# Patient Record
Sex: FEMALE | Race: WHITE | NOT HISPANIC OR LATINO | URBAN - METROPOLITAN AREA
[De-identification: names, ages, dates, MRNs, and addresses within clinical notes are randomized per-mention and may not be internally consistent; named-entity substitution may affect disease eponyms.]

---

## 2021-07-20 ENCOUNTER — OFFICE VISIT (OUTPATIENT)
Dept: URGENT CARE | Facility: CLINIC | Age: 51
End: 2021-07-20
Payer: COMMERCIAL

## 2021-07-20 VITALS
HEART RATE: 86 BPM | WEIGHT: 240.8 LBS | RESPIRATION RATE: 18 BRPM | HEIGHT: 66 IN | BODY MASS INDEX: 38.7 KG/M2 | TEMPERATURE: 97.8 F | SYSTOLIC BLOOD PRESSURE: 172 MMHG | OXYGEN SATURATION: 98 % | DIASTOLIC BLOOD PRESSURE: 100 MMHG

## 2021-07-20 DIAGNOSIS — G44.209 ACUTE NON INTRACTABLE TENSION-TYPE HEADACHE: Primary | ICD-10-CM

## 2021-07-20 PROCEDURE — 99213 OFFICE O/P EST LOW 20 MIN: CPT | Performed by: PHYSICIAN ASSISTANT

## 2021-07-20 RX ORDER — METOPROLOL SUCCINATE 50 MG/1
50 TABLET, EXTENDED RELEASE ORAL DAILY
COMMUNITY
Start: 2021-05-24 | End: 2021-12-29

## 2021-07-20 RX ORDER — AMLODIPINE BESYLATE 10 MG/1
10 TABLET ORAL DAILY
COMMUNITY
Start: 2021-05-24 | End: 2021-12-29

## 2021-07-20 NOTE — PROGRESS NOTES
[facility]        NAME: Bari Noel is a 45 y o  female  : 1970    MRN: [id number]  DATE: 2021  TIME: 12:05 PM    Assessment and Plan   Acute non intractable tension-type headache [G44 209]  1  Acute non intractable tension-type headache           Patient Instructions     Patient Instructions   Recommend continued use of OTC tylenol/ibuprofen as needed for symptomatic relief of tension type headache likely induced by increased stress  Advised patient establish a PCP in area for follow up  Advised to return or be seen in ER with any progressing or worsening symptoms  Follow up with PCP in 3-5 days  Proceed to  ER if symptoms worsen  Chief Complaint     Chief Complaint   Patient presents with    Headache     Notes HA since receiving Suzzapadminie Favian vaccine on 21  Head feels like "in a vise" with pain R temple and eyes  Denies facial pain  Rhinorrhea started today  Has nausea and occ  vertigo    Taking Tylenol or Excedrin  History of Present Illness       Patient is a 44yo F presenting today with headache x 11 days  Patient states following COVID19 vaccine on 21 she has been experiencing intermittent tension like headaches daily  She describes the pain as a squeezing like feeling on both sides of her head and behind her eyes, she notes occasional associated nausea and one episode of vomiting  States the pain comes and goes noting symptoms are worse at night  She states she has taken OTC tylenol as recommended as well as Excedrin with minimal relief  She notes mild congestion and rhinorrhea, has not taken anything for these symptoms  States she has had some blurred vision while pain is at its worst, denies vision loss or eye pain  She denies fever, chills, lightheaded or dizziness, loss of balance, photophobia   Patient admits that she has recently been under a lot more stress with the care of her parents and with her work which have been affecting her sleep at night        Review of Systems   Review of Systems   Constitutional: Negative for chills and fever  HENT: Positive for congestion and rhinorrhea  Negative for ear pain, sinus pressure, sinus pain and sore throat  Eyes: Negative for photophobia, pain and visual disturbance  Respiratory: Negative for cough, chest tightness and shortness of breath  Cardiovascular: Negative for chest pain and palpitations  Gastrointestinal: Positive for nausea and vomiting (1 episode)  Negative for abdominal pain  Musculoskeletal: Negative for myalgias  Skin: Negative for rash  Neurological: Positive for headaches (tension like x 11 days)  Negative for dizziness, syncope, weakness and light-headedness     Psychiatric/Behavioral:        Increased stress and anxiety         Current Medications       Current Outpatient Medications:     amLODIPine (NORVASC) 10 mg tablet, Take 10 mg by mouth daily, Disp: , Rfl:     metoprolol succinate (TOPROL-XL) 50 mg 24 hr tablet, Take 50 mg by mouth daily, Disp: , Rfl:     Current Allergies     Allergies as of 07/20/2021 - Reviewed 07/20/2021   Allergen Reaction Noted    Adhesive [medical tape] Rash 02/20/2019    Betadine [povidone iodine] Rash 07/20/2021            The following portions of the patient's history were reviewed and updated as appropriate: allergies, current medications, past family history, past medical history, past social history, past surgical history and problem list      Past Medical History:   Diagnosis Date    Allergic rhinitis     Anxiety     Disease of thyroid gland     Hypertension     Kidney stones     left side    Migraine     teenager       Past Surgical History:   Procedure Laterality Date    APPENDECTOMY      CHOLECYSTECTOMY      HERNIA REPAIR      umbilical x 2    KNEE CARTILAGE SURGERY Right     and ACL    LAPAROSCOPIC GASTRIC BANDING      removed    OVARIAN CYST SURGERY      SHOULDER SURGERY Right     x 3       No family history on file       Medications have been verified  Objective   BP (!) 172/100   Pulse 86   Temp 97 8 °F (36 6 °C)   Resp 18   Ht 5' 6" (1 676 m)   Wt 109 kg (240 lb 12 8 oz)   SpO2 98%   BMI 38 87 kg/m²        Physical Exam     Physical Exam  Vitals reviewed  Constitutional:       General: She is not in acute distress  Appearance: Normal appearance  She is not ill-appearing  Comments: Patient appears comfortable, looking through phone upon entering the room  HENT:      Head: Normocephalic and atraumatic  Comments: Mild tenderness of temporal region bilaterally upon palpation  Right Ear: Tympanic membrane, ear canal and external ear normal       Left Ear: Tympanic membrane, ear canal and external ear normal       Nose: Congestion present  Mouth/Throat:      Mouth: Mucous membranes are moist       Pharynx: Oropharynx is clear  Eyes:      General:         Right eye: No discharge  Left eye: No discharge  Extraocular Movements: Extraocular movements intact  Conjunctiva/sclera: Conjunctivae normal       Pupils: Pupils are equal, round, and reactive to light  Neck:      Vascular: No carotid bruit  Cardiovascular:      Rate and Rhythm: Normal rate and regular rhythm  Pulses: Normal pulses  Heart sounds: Normal heart sounds  Pulmonary:      Effort: Pulmonary effort is normal       Breath sounds: Normal breath sounds  Musculoskeletal:         General: Normal range of motion  Cervical back: Normal range of motion and neck supple  No rigidity or tenderness  Lymphadenopathy:      Cervical: No cervical adenopathy  Skin:     General: Skin is warm and dry  Capillary Refill: Capillary refill takes less than 2 seconds  Neurological:      General: No focal deficit present  Mental Status: She is alert and oriented to person, place, and time  Cranial Nerves: No cranial nerve deficit  Motor: No weakness        Coordination: Coordination normal       Deep Tendon Reflexes: Reflexes normal    Psychiatric:         Mood and Affect: Mood normal          Behavior: Behavior normal

## 2021-07-20 NOTE — PATIENT INSTRUCTIONS
Recommend continued use of OTC tylenol/ibuprofen as needed for symptomatic relief of tension type headache likely induced by increased stress  Advised patient establish a PCP in area for follow up  Advised to return or be seen in ER with any progressing or worsening symptoms  [facility] Instructions    MEDICATIONS     Medication Note  Continue present medications as prescribed by the [facility] or other physicians you see. To avoid any problems keep the [facility] informed each visit of any medications changes that occur.     WOUND CARE     Clean Wound with: Saline Solution   Treatment 1   Location: left foot/leg  Dressing: Apply yessi, adaptic, with dry dressing & ace.  Use amlactin on dried skin areas   Dressing Care Frequency: Every Other Dayor daily  Care Provider: family       Basic Principles      • Wash your hands thoroughly with soap and water and after each dressing change. If someone other than the patient changes the dressing, it’s best to wear disposable gloves.   • Do not get the wound or dressing wet.   • To shower: remove the dressing, shower with soap and water (including washing the wound - do not use a washcloth), air dry, then redress the wound.  • Do not take a tub bath  • Keep all your dressings in a clean, covered container at home to avoid dust and contamination.  • Discard used dressings in a plastic bag or covered trash container.  • Check your wound and the surrounding skin at each dressing change for redness, warmth, swelling, increased pain, foul odor, fever, pus or abnormal drainage or discharge.  • Notify [facility]aling Center if any of these changes occur - Dept: [phone].        Nutrition  • Eat a well, balanced diet with adequate protein to support wound healing.   • Take a multivitamin every day. Adequate nutrition supports healing and new tissue growth.  • All diabetic patients should strive to keep blood sugars within a normal, practical range.   • Elevated blood sugars can delay your wound healing.        ACTIVITY     Elevate legs above level of heart   Normal activity then rest and elevation  May excercise  May walk    MOBILITY     boot

## 2021-09-23 ENCOUNTER — OFFICE VISIT (OUTPATIENT)
Dept: URGENT CARE | Facility: CLINIC | Age: 51
End: 2021-09-23
Payer: COMMERCIAL

## 2021-09-23 VITALS
BODY MASS INDEX: 38.25 KG/M2 | WEIGHT: 237 LBS | RESPIRATION RATE: 18 BRPM | SYSTOLIC BLOOD PRESSURE: 182 MMHG | OXYGEN SATURATION: 99 % | TEMPERATURE: 97.7 F | DIASTOLIC BLOOD PRESSURE: 95 MMHG | HEART RATE: 73 BPM

## 2021-09-23 DIAGNOSIS — R05.9 COUGH: ICD-10-CM

## 2021-09-23 DIAGNOSIS — J02.9 SORE THROAT: Primary | ICD-10-CM

## 2021-09-23 DIAGNOSIS — R11.0 NAUSEA: ICD-10-CM

## 2021-09-23 DIAGNOSIS — F41.9 ANXIETY: ICD-10-CM

## 2021-09-23 LAB — S PYO AG THROAT QL: NEGATIVE

## 2021-09-23 PROCEDURE — 99204 OFFICE O/P NEW MOD 45 MIN: CPT | Performed by: PHYSICIAN ASSISTANT

## 2021-09-23 PROCEDURE — 87880 STREP A ASSAY W/OPTIC: CPT | Performed by: PHYSICIAN ASSISTANT

## 2021-09-23 RX ORDER — BROMPHENIRAMINE MALEATE, PSEUDOEPHEDRINE HYDROCHLORIDE, AND DEXTROMETHORPHAN HYDROBROMIDE 2; 30; 10 MG/5ML; MG/5ML; MG/5ML
5 SYRUP ORAL 4 TIMES DAILY PRN
Qty: 120 ML | Refills: 0 | Status: SHIPPED | OUTPATIENT
Start: 2021-09-23

## 2021-09-23 RX ORDER — ONDANSETRON 4 MG/1
4 TABLET, ORALLY DISINTEGRATING ORAL EVERY 6 HOURS PRN
Qty: 20 TABLET | Refills: 0 | Status: SHIPPED | OUTPATIENT
Start: 2021-09-23

## 2021-09-23 RX ORDER — PREDNISONE 10 MG/1
TABLET ORAL
Qty: 18 TABLET | Refills: 0 | Status: SHIPPED | OUTPATIENT
Start: 2021-09-23

## 2021-09-23 NOTE — PATIENT INSTRUCTIONS
Zofran for nausea   Bromfed for cold symptoms   Prednisone for the symptoms   Use Flonase as well     Vitamin D3, C and zinc

## 2021-09-23 NOTE — PROGRESS NOTES
[facility] [facility]        NAME: Berenice Lundberg is a 45 y o  female  : 1970    MRN: [id number]  DATE: 2021  TIME: 1:55 PM    Assessment and Plan   Sore throat [J02 9]  1  Sore throat  POCT rapid strepA    Throat culture   2  Nausea  ondansetron (Zofran ODT) 4 mg disintegrating tablet   3  Cough  brompheniramine-pseudoephedrine-DM 30-2-10 MG/5ML syrup    predniSONE 10 mg tablet   4  Anxiety  Ambulatory referral to Psychiatry         Patient Instructions   Patient Instructions   Zofran for nausea   Bromfed for cold symptoms   Prednisone for the symptoms   Use Flonase as well     Vitamin D3, C and zinc          Follow up with PCP in 3-5 days  Proceed to  ER if symptoms worsen  Chief Complaint     Chief Complaint   Patient presents with    Sinusitis     post nasal drip , sore throat, ear pain and intermittent fevers  vomiting today         History of Present Illness       The pt is a 19-year-old female presenting with PND, sore throat, cough, ear pain and intermittent fevers  Has been vomiting daily for about a week-2  Fever was 102  1  Vomites up whatever she eats  Is on HTN meds but is throwing it up  The pt is new to the area and needs a PCP/psychiatrist  She explains that she has been under a lot of stress recently which could be attributing to her not feeling well  Review of Systems   Review of Systems   Constitutional: Positive for fever  Negative for activity change, appetite change, chills and fatigue  HENT: Positive for ear pain, postnasal drip and sore throat  Negative for congestion, rhinorrhea, sinus pressure and sinus pain  Respiratory: Negative for cough, chest tightness and shortness of breath  Cardiovascular: Negative for chest pain and palpitations  Gastrointestinal: Negative for diarrhea, nausea and vomiting  Musculoskeletal: Negative for arthralgias and myalgias  Skin: Negative for color change and pallor  Neurological: Negative for headaches  Current Medications       Current Outpatient Medications:     amLODIPine (NORVASC) 10 mg tablet, Take 10 mg by mouth daily, Disp: , Rfl:     metoprolol succinate (TOPROL-XL) 50 mg 24 hr tablet, Take 50 mg by mouth daily, Disp: , Rfl:     brompheniramine-pseudoephedrine-DM 30-2-10 MG/5ML syrup, Take 5 mL by mouth 4 (four) times a day as needed for allergies, Disp: 120 mL, Rfl: 0    ondansetron (Zofran ODT) 4 mg disintegrating tablet, Take 1 tablet (4 mg total) by mouth every 6 (six) hours as needed for nausea or vomiting, Disp: 20 tablet, Rfl: 0    predniSONE 10 mg tablet, 3 tabs daily for 3 days, 2 tabs daily for 3 days, 1 tab daily for 3 days, Disp: 18 tablet, Rfl: 0    Current Allergies     Allergies as of 09/23/2021 - Reviewed 09/23/2021   Allergen Reaction Noted    Adhesive [medical tape] Rash 02/20/2019    Betadine [povidone iodine] Rash 07/20/2021            The following portions of the patient's history were reviewed and updated as appropriate: allergies, current medications, past family history, past medical history, past social history, past surgical history and problem list      Past Medical History:   Diagnosis Date    Allergic rhinitis     Anxiety     Disease of thyroid gland     Hypertension     Kidney stones     left side    Migraine     teenager       Past Surgical History:   Procedure Laterality Date    APPENDECTOMY      CHOLECYSTECTOMY      HERNIA REPAIR      umbilical x 2    KNEE CARTILAGE SURGERY Right     and ACL    LAPAROSCOPIC GASTRIC BANDING      removed    OVARIAN CYST SURGERY      SHOULDER SURGERY Right     x 3       History reviewed  No pertinent family history  Medications have been verified  Objective   BP (!) 182/95   Pulse 73   Temp 97 7 °F (36 5 °C)   Resp 18   Wt 108 kg (237 lb)   SpO2 99%   BMI 38 25 kg/m²        Physical Exam     Physical Exam  Constitutional:       General: She is not in acute distress       Appearance: Normal appearance  She is normal weight  She is not ill-appearing, toxic-appearing or diaphoretic  HENT:      Head: Normocephalic and atraumatic  Right Ear: Tympanic membrane, ear canal and external ear normal  There is no impacted cerumen  Left Ear: Tympanic membrane, ear canal and external ear normal  There is no impacted cerumen  Ears:      Comments: Left ear canal had a small black hair in it        Nose: Nose normal  No congestion or rhinorrhea  Mouth/Throat:      Mouth: Mucous membranes are moist       Pharynx: Oropharynx is clear  Posterior oropharyngeal erythema present  No oropharyngeal exudate  Cardiovascular:      Rate and Rhythm: Normal rate and regular rhythm  Heart sounds: Normal heart sounds  No murmur heard  No friction rub  No gallop  Pulmonary:      Effort: Pulmonary effort is normal  No respiratory distress  Breath sounds: Normal breath sounds  No stridor  No wheezing, rhonchi or rales  Chest:      Chest wall: No tenderness  Abdominal:      General: Abdomen is flat  Bowel sounds are normal  There is no distension  Palpations: Abdomen is soft  There is no mass  Tenderness: There is no abdominal tenderness  There is no right CVA tenderness, left CVA tenderness, guarding or rebound  Hernia: No hernia is present  Musculoskeletal:         General: Normal range of motion  Skin:     General: Skin is warm and dry  Capillary Refill: Capillary refill takes less than 2 seconds  Neurological:      Mental Status: She is alert

## 2021-10-07 ENCOUNTER — TELEPHONE (OUTPATIENT)
Dept: PSYCHIATRY | Facility: CLINIC | Age: 51
End: 2021-10-07

## 2021-12-07 ENCOUNTER — APPOINTMENT (OUTPATIENT)
Dept: RADIOLOGY | Facility: CLINIC | Age: 51
End: 2021-12-07
Payer: COMMERCIAL

## 2021-12-07 ENCOUNTER — TELEPHONE (OUTPATIENT)
Dept: URGENT CARE | Facility: CLINIC | Age: 51
End: 2021-12-07

## 2021-12-07 ENCOUNTER — OFFICE VISIT (OUTPATIENT)
Dept: URGENT CARE | Facility: CLINIC | Age: 51
End: 2021-12-07
Payer: COMMERCIAL

## 2021-12-07 VITALS
HEART RATE: 95 BPM | SYSTOLIC BLOOD PRESSURE: 174 MMHG | RESPIRATION RATE: 12 BRPM | OXYGEN SATURATION: 98 % | DIASTOLIC BLOOD PRESSURE: 93 MMHG | TEMPERATURE: 97.7 F

## 2021-12-07 DIAGNOSIS — S96.912A MUSCLE STRAIN OF LEFT FOOT, INITIAL ENCOUNTER: Primary | ICD-10-CM

## 2021-12-07 DIAGNOSIS — S99.922A INJURY OF LEFT FOOT, INITIAL ENCOUNTER: ICD-10-CM

## 2021-12-07 PROCEDURE — 73630 X-RAY EXAM OF FOOT: CPT

## 2021-12-07 PROCEDURE — 99213 OFFICE O/P EST LOW 20 MIN: CPT | Performed by: PHYSICIAN ASSISTANT

## 2021-12-07 RX ORDER — ALPRAZOLAM 1 MG/1
TABLET ORAL 2 TIMES DAILY PRN
COMMUNITY

## 2021-12-27 ENCOUNTER — HOSPITAL ENCOUNTER (EMERGENCY)
Facility: HOSPITAL | Age: 51
Discharge: HOME | End: 2021-12-27
Attending: EMERGENCY MEDICINE | Admitting: EMERGENCY MEDICINE
Payer: COMMERCIAL

## 2021-12-27 ENCOUNTER — APPOINTMENT (EMERGENCY)
Dept: RADIOLOGY | Facility: HOSPITAL | Age: 51
End: 2021-12-27
Payer: COMMERCIAL

## 2021-12-27 VITALS
HEIGHT: 66 IN | RESPIRATION RATE: 16 BRPM | BODY MASS INDEX: 36.16 KG/M2 | TEMPERATURE: 98.1 F | WEIGHT: 225 LBS | SYSTOLIC BLOOD PRESSURE: 178 MMHG | HEART RATE: 68 BPM | OXYGEN SATURATION: 100 % | DIASTOLIC BLOOD PRESSURE: 104 MMHG

## 2021-12-27 DIAGNOSIS — M79.672 LEFT FOOT PAIN: ICD-10-CM

## 2021-12-27 DIAGNOSIS — S92.302A: Primary | ICD-10-CM

## 2021-12-27 PROCEDURE — 99283 EMERGENCY DEPT VISIT LOW MDM: CPT

## 2021-12-27 PROCEDURE — 99284 EMERGENCY DEPT VISIT MOD MDM: CPT | Performed by: PHYSICIAN ASSISTANT

## 2021-12-27 PROCEDURE — 73630 X-RAY EXAM OF FOOT: CPT

## 2021-12-27 RX ORDER — OXYCODONE HYDROCHLORIDE AND ACETAMINOPHEN 5; 325 MG/1; MG/1
1 TABLET ORAL EVERY 6 HOURS PRN
Qty: 10 TABLET | Refills: 0 | Status: SHIPPED | OUTPATIENT
Start: 2021-12-27

## 2021-12-27 RX ORDER — KETOROLAC TROMETHAMINE 30 MG/ML
15 INJECTION, SOLUTION INTRAMUSCULAR; INTRAVENOUS ONCE
Status: DISCONTINUED | OUTPATIENT
Start: 2021-12-27 | End: 2021-12-27 | Stop reason: HOSPADM

## 2021-12-27 RX ORDER — OXYCODONE HYDROCHLORIDE AND ACETAMINOPHEN 5; 325 MG/1; MG/1
1 TABLET ORAL ONCE
Status: COMPLETED | OUTPATIENT
Start: 2021-12-27 | End: 2021-12-27

## 2021-12-27 RX ADMIN — OXYCODONE AND ACETAMINOPHEN 1 TABLET: 5; 325 TABLET ORAL at 17:06

## 2021-12-27 NOTE — ED PROVIDER NOTES
History  Chief Complaint   Patient presents with    Foot Injury     States broken L foot x3 weeks, seen when incident occurred, not prescribed any medications or given any interventions, unable to follow up with anyone, states pain now unbearable  Patient is a 46 y/o female with a PMHx of hypertension and anxiety, presenting to the ED for evaluation of left foot pain  Patient had an x-ray done at urgent care on 12/7 which showed an acute, nondisplaced 4th metatarsal neck fracture  She states that they attempted to put her in a hard-soled surgical shoe but did not have her size available and she was discharged home with an ace wrap  Patient has attempted calling multiple orthopedic providers but has not been able to get an appointment  She reports worsening pain in the foot that is not relieved with tylenol  She was not given any pain medications when the injury occurred  She reports some improvement of swelling and ecchymosis of the foot but does not feel the pain has improved  She denies any calf tenderness, lower extremity edema, SOB, fevers, recent travel, hx of DVT/PE or exogenous estrogen use  Prior to Admission Medications   Prescriptions Last Dose Informant Patient Reported? Taking?    ALPRAZolam (XANAX) 1 mg tablet   Yes No   Sig: Take by mouth 2 (two) times a day as needed for anxiety   amLODIPine (NORVASC) 10 mg tablet   Yes No   Sig: Take 10 mg by mouth daily   brompheniramine-pseudoephedrine-DM 30-2-10 MG/5ML syrup   No No   Sig: Take 5 mL by mouth 4 (four) times a day as needed for allergies   Patient not taking: Reported on 12/7/2021    metoprolol succinate (TOPROL-XL) 50 mg 24 hr tablet   Yes No   Sig: Take 50 mg by mouth daily   ondansetron (Zofran ODT) 4 mg disintegrating tablet   No No   Sig: Take 1 tablet (4 mg total) by mouth every 6 (six) hours as needed for nausea or vomiting   Patient not taking: Reported on 12/7/2021    predniSONE 10 mg tablet   No No   Sig: 3 tabs daily for 3 days, 2 tabs daily for 3 days, 1 tab daily for 3 days   Patient not taking: Reported on 12/7/2021       Facility-Administered Medications: None       Past Medical History:   Diagnosis Date    Allergic rhinitis     Anxiety     Disease of thyroid gland     Hypertension     Kidney stones     left side    Migraine     teenager       Past Surgical History:   Procedure Laterality Date    APPENDECTOMY      CHOLECYSTECTOMY      HERNIA REPAIR      umbilical x 2    KNEE CARTILAGE SURGERY Right     and ACL    LAPAROSCOPIC GASTRIC BANDING      removed    OVARIAN CYST SURGERY      SHOULDER SURGERY Right     x 3       History reviewed  No pertinent family history  I have reviewed and agree with the history as documented  E-Cigarette/Vaping    E-Cigarette Use Never User      E-Cigarette/Vaping Substances    Nicotine No     THC No     CBD No     Flavoring No     Other No     Unknown No      Social History     Tobacco Use    Smoking status: Current Every Day Smoker     Packs/day: 0 25     Types: Cigarettes    Smokeless tobacco: Never Used   Vaping Use    Vaping Use: Never used   Substance Use Topics    Alcohol use: Never    Drug use: Never       Review of Systems   Constitutional: Negative for appetite change, chills, fatigue and fever  HENT: Negative for congestion, rhinorrhea, sinus pressure, sinus pain and sore throat  Eyes: Negative for photophobia and visual disturbance  Respiratory: Negative for cough, shortness of breath and wheezing  Cardiovascular: Negative for chest pain, palpitations and leg swelling  Gastrointestinal: Negative for abdominal pain, blood in stool, constipation, diarrhea, nausea and vomiting  Genitourinary: Negative for difficulty urinating, dysuria, flank pain, frequency, hematuria and urgency  Musculoskeletal: Negative for back pain, joint swelling and neck pain          Left foot pain   Neurological: Negative for dizziness, syncope, weakness, light-headedness and headaches  All other systems reviewed and are negative  Physical Exam  Physical Exam  Vitals and nursing note reviewed  Constitutional:       General: She is awake  Appearance: Normal appearance  She is well-developed  She is not toxic-appearing or diaphoretic  HENT:      Head: Normocephalic and atraumatic  Right Ear: External ear normal       Left Ear: External ear normal       Nose: Nose normal       Mouth/Throat:      Lips: Pink  Mouth: Mucous membranes are moist    Eyes:      General: Lids are normal  No scleral icterus  Conjunctiva/sclera: Conjunctivae normal       Pupils: Pupils are equal, round, and reactive to light  Cardiovascular:      Rate and Rhythm: Normal rate and regular rhythm  Pulses: Normal pulses  Radial pulses are 2+ on the right side and 2+ on the left side  Heart sounds: Normal heart sounds, S1 normal and S2 normal    Pulmonary:      Effort: Pulmonary effort is normal  No accessory muscle usage  Breath sounds: Normal breath sounds  No stridor  No decreased breath sounds, wheezing, rhonchi or rales  Abdominal:      General: Abdomen is flat  Bowel sounds are normal  There is no distension  Palpations: Abdomen is soft  Tenderness: There is no abdominal tenderness  There is no right CVA tenderness, left CVA tenderness, guarding or rebound  Musculoskeletal:      Cervical back: Full passive range of motion without pain and neck supple  No signs of trauma  No pain with movement  Right lower leg: No edema  Left lower leg: No edema  Feet:       Comments: No calf tenderness or lower extremity edema; negative Cas's sign   Feet:      Comments: Tenderness over the 3rd/4th metatarsal region; no swelling, ecchymosis or erythema; PT/DP pulses 2+; sensation intact  Lymphadenopathy:      Cervical: No cervical adenopathy  Skin:     General: Skin is warm and dry        Capillary Refill: Capillary refill takes less than 2 seconds  Coloration: Skin is not cyanotic, jaundiced or pale  Neurological:      Mental Status: She is alert and oriented to person, place, and time  GCS: GCS eye subscore is 4  GCS verbal subscore is 5  GCS motor subscore is 6  Gait: Gait normal    Psychiatric:         Mood and Affect: Mood normal          Speech: Speech normal          Behavior: Behavior is cooperative  Vital Signs  ED Triage Vitals   Temperature Pulse Respirations Blood Pressure SpO2   12/27/21 1229 12/27/21 1229 12/27/21 1229 12/27/21 1229 12/27/21 1229   98 1 °F (36 7 °C) 80 18 (!) 209/100 96 %      Temp Source Heart Rate Source Patient Position - Orthostatic VS BP Location FiO2 (%)   12/27/21 1229 12/27/21 1229 12/27/21 1229 12/27/21 1229 --   Oral Monitor Sitting Left arm       Pain Score       12/27/21 1230       10 - Worst Possible Pain           Vitals:    12/27/21 1229 12/27/21 1653   BP: (!) 209/100 (!) 178/104   Pulse: 80 68   Patient Position - Orthostatic VS: Sitting Sitting         Visual Acuity      ED Medications  Medications   ketorolac (TORADOL) injection 15 mg (15 mg Intramuscular Not Given 12/27/21 1705)   oxyCODONE-acetaminophen (PERCOCET) 5-325 mg per tablet 1 tablet (1 tablet Oral Given 12/27/21 1706)       Diagnostic Studies  Results Reviewed     None                 XR foot 3+ views LEFT    (Results Pending)              Procedures  Procedures         ED Course                               SBIRT 22yo+      Most Recent Value   SBIRT (25 yo +)    In order to provide better care to our patients, we are screening all of our patients for alcohol and drug use  Would it be okay to ask you these screening questions?  Unable to answer at this time Filed at: 12/27/2021 1230                    MDM  Number of Diagnoses or Management Options  Closed fracture of neck of metatarsal bone of left foot  Left foot pain  Diagnosis management comments: Patient is a 44 y/o female with a PMHx of hypertension and anxiety, presenting to the ED for evaluation of left foot pain  Repeat x-ray with similar appearance of fracture of 4th metatarsal  Reached out to podiatry and placed ambulatory referral to ensure prompt follow-up  Patient placed in a surgical hard-soled shoe  She declines crutches at this time  Strict ED return precautions discussed in detail  The management plan was discussed in detail with the patient at bedside and all questions were answered  Prior to discharge, verbal and written instructions provided  The patient verbalized understanding of our discussion and plan of care, and agrees to return to the Emergency Department for concerns and progression of illness  Amount and/or Complexity of Data Reviewed  Tests in the radiology section of CPT®: ordered and reviewed    Patient Progress  Patient progress: stable      Disposition  Final diagnoses:   Closed fracture of neck of metatarsal bone of left foot   Left foot pain     Time reflects when diagnosis was documented in both MDM as applicable and the Disposition within this note     Time User Action Codes Description Comment    12/27/2021  5:53 PM Jennie Munoz [S92 302A] Closed fracture of neck of metatarsal bone of left foot     12/27/2021  5:54 PM Jennie Munoz [[id number]] Left foot pain       ED Disposition     ED Disposition Condition Date/Time Comment    Discharge Stable Mon Dec 27, 2021  5:53 PM Chandni Horner discharge to home/self care              Follow-up Information     Follow up With Specialties Details Why Contact Info Additional Information    [facility] Podiatry Schedule an appointment as soon as possible for a visit   [address] [address] [phone] [address] [address] [place] [address]  Emergency Department Emergency Medicine  If symptoms worsen [address] [facility] [address] [facility], [address], [place], [place], [address]          Patient's Medications   Discharge Prescriptions    OXYCODONE-ACETAMINOPHEN (PERCOCET) 5-325 MG PER TABLET    Take 1 tablet by mouth every 6 (six) hours as needed for moderate pain Max Daily Amount: 4 tablets       Start Date: 12/27/2021End Date: --       Order Dose: 1 tablet       Quantity: 10 tablet    Refills: 0           PDMP Review       Value Time User    PDMP Reviewed  Yes 12/27/2021  4:44 PM Joy Mckoy PA-C          ED Provider  Electronically Signed by           Joy Mckoy PA-C  12/27/21 1726

## 2021-12-29 ENCOUNTER — TELEPHONE (OUTPATIENT)
Dept: OBGYN CLINIC | Facility: HOSPITAL | Age: 51
End: 2021-12-29

## 2021-12-29 ENCOUNTER — TELEPHONE (OUTPATIENT)
Dept: PODIATRY | Facility: CLINIC | Age: 51
End: 2021-12-29

## 2021-12-29 ENCOUNTER — OFFICE VISIT (OUTPATIENT)
Dept: OBGYN CLINIC | Facility: CLINIC | Age: 51
End: 2021-12-29
Payer: COMMERCIAL

## 2021-12-29 VITALS
BODY MASS INDEX: 36.16 KG/M2 | HEIGHT: 66 IN | DIASTOLIC BLOOD PRESSURE: 93 MMHG | HEART RATE: 67 BPM | WEIGHT: 225 LBS | SYSTOLIC BLOOD PRESSURE: 148 MMHG

## 2021-12-29 DIAGNOSIS — S92.345A CLOSED NONDISPLACED FRACTURE OF FOURTH METATARSAL BONE OF LEFT FOOT, INITIAL ENCOUNTER: Primary | ICD-10-CM

## 2021-12-29 PROCEDURE — 28470 CLTX METATARSAL FX WO MNP EA: CPT | Performed by: ORTHOPAEDIC SURGERY

## 2021-12-29 PROCEDURE — 99203 OFFICE O/P NEW LOW 30 MIN: CPT | Performed by: ORTHOPAEDIC SURGERY

## 2021-12-29 RX ORDER — ASPIRIN 81 MG/1
81 TABLET ORAL DAILY
Qty: 30 TABLET | Refills: 0 | Status: SHIPPED | OUTPATIENT
Start: 2021-12-29

## 2022-01-19 ENCOUNTER — OFFICE VISIT (OUTPATIENT)
Dept: OBGYN CLINIC | Facility: CLINIC | Age: 52
End: 2022-01-19

## 2022-01-19 ENCOUNTER — APPOINTMENT (OUTPATIENT)
Dept: RADIOLOGY | Facility: AMBULARY SURGERY CENTER | Age: 52
End: 2022-01-19
Attending: ORTHOPAEDIC SURGERY
Payer: COMMERCIAL

## 2022-01-19 VITALS
HEART RATE: 67 BPM | HEIGHT: 66 IN | SYSTOLIC BLOOD PRESSURE: 139 MMHG | WEIGHT: 225 LBS | DIASTOLIC BLOOD PRESSURE: 85 MMHG | BODY MASS INDEX: 36.16 KG/M2

## 2022-01-19 DIAGNOSIS — S92.345A CLOSED NONDISPLACED FRACTURE OF FOURTH METATARSAL BONE OF LEFT FOOT, INITIAL ENCOUNTER: ICD-10-CM

## 2022-01-19 DIAGNOSIS — S92.345D CLOSED NONDISPLACED FRACTURE OF FOURTH METATARSAL BONE OF LEFT FOOT WITH ROUTINE HEALING, SUBSEQUENT ENCOUNTER: Primary | ICD-10-CM

## 2022-01-19 PROCEDURE — 73630 X-RAY EXAM OF FOOT: CPT

## 2022-01-19 PROCEDURE — 99024 POSTOP FOLLOW-UP VISIT: CPT | Performed by: ORTHOPAEDIC SURGERY

## 2022-01-19 NOTE — PATIENT INSTRUCTIONS
You may now begin weaning your boot and transitioning to a sneaker  It is important to do this gradually to avoid aggravating the healing process  1  Today, you may come out of the boot into a sneaker for 2 hours  2  Tomorrow, you may come out of the boot into a sneaker for 4 hours,  3  The next day, you may come out of the boot into a sneaker for 6 hours  4  Continue this (adding 2 hours per day) as you tolerate  For example, if you do 6 hours out of the boot into a sneaker and your foot swells more than usual at night and it is difficult to control the discomfort, do not advance to 8 hours the next day, stay at 6 hours until you are able to tolerate it  Elevation, Ice and tylenol and staying off of it at night will be important to aide in this transition out of the boot  Swelling and soreness are normal as you begin to do more with the injured leg      Continue Vitamin D and Calcium for 4 more weeks  May discontinue Aspirin

## 2022-01-19 NOTE — PROGRESS NOTES
MARLO Whalen  Attending, Orthopaedic Surgery  [facility] [address] [facility]      ORTHOPAEDIC FOOT AND ANKLE CLINIC VISIT     Assessment:     Encounter Diagnosis   Name Primary?  Closed nondisplaced fracture of fourth metatarsal bone of left foot with routine healing, subsequent encounter Yes            Plan:   · The patient verbalized understanding of exam findings and treatment plan  We engaged in the shared decision-making process and treatment options were discussed at length with the patient  Surgical and conservative management discussed today along with risks and benefits  · Lisandra Warner is following up 6 weeks s/p left 4th MT fracture, treating non-op  · Plan provided to wean post-op shoe  · Continue Vitamin D and Calcium  · May DC Aspirin  · Discussed that her shooting pain is likely secondary to nerve irritation from the trauma itself and/or inflammation  This should resolve over time  Return if symptoms worsen or fail to improve  History of Present Illness:   Chief Complaint:   Chief Complaint   Patient presents with    Left Foot - Follow-up     Salena Iqbal is a 45 y o  female who is being seen in follow-up for left 4th metatarsal fracture  When we last saw she we recommended WBAT in a post-op shoe  Pain has improved  Residual pain is localized at the distal foot with minimal radiating and described as sharp and severe  She described this as nerve pain        Pain/symptom timing:  Worse during the day when active  Pain/symptom context:  Worse with activites and work  Pain/symptom modifying factors:  Rest makes better, activities make worse  Pain/symptom associated signs/symptoms: none    Prior treatment   · NSAIDsYes and Not Asked   · Injections No   · Bracing/Orthotics Yes    · Physical Therapy No     Orthopedic Surgical History:   See below    Past Medical, Surgical and Social History:  Past Medical History:  has a past medical history of Allergic rhinitis, Anxiety, Disease of thyroid gland, Hypertension, Kidney stones, and Migraine  Problem List: does not have a problem list on file  Past Surgical History:  has a past surgical history that includes Shoulder surgery (Right); Hernia repair; Appendectomy; Cholecystectomy; Knee cartilage surgery (Right); Ovarian cyst surgery; and Laparoscopic gastric banding  Family History: family history is not on file  Social History:  reports that she has been smoking cigarettes  She has been smoking about 0 25 packs per day  She has never used smokeless tobacco  She reports that she does not drink alcohol and does not use drugs  Current Medications: has a current medication list which includes the following prescription(s): alprazolam, aspirin, amlodipine, brompheniramine-pseudoephedrine-dm, metoprolol succinate, ondansetron, oxycodone-acetaminophen, and prednisone  Allergies: is allergic to adhesive [medical tape] and betadine [povidone iodine]  Review of Systems:  General- denies fever/chills  HEENT- denies hearing loss or sore throat  Eyes- denies eye pain or visual disturbances, denies red eyes  Respiratory- denies cough or SOB  Cardio- denies chest pain or palpitations  GI- denies abdominal pain  Endocrine- denies urinary frequency  Urinary- denies pain with urination  Musculoskeletal- Negative except noted above  Skin- denies rashes or wounds  Neurological- denies dizziness or headache  Psychiatric- denies anxiety or difficulty concentrating    Physical Exam:   /85 (BP Location: Right arm, Patient Position: Sitting, Cuff Size: Adult)   Pulse 67   Ht 5' 6" (1 676 m)   Wt 102 kg (225 lb)   BMI 36 32 kg/m²   General/Constitutional: No apparent distress: well-nourished and well developed    Eyes: normal ocular motion  Lymphatic: No appreciable lymphadenopathy  Respiratory: Non-labored breathing  Vascular: No edema, swelling or tenderness, except as noted in detailed exam   Integumentary: No impressive skin lesions present, except as noted in detailed exam   Neuro: No ataxia or tremors noted  Psych: Normal mood and affect, oriented to person, place and time  Appropriate affect  Musculoskeletal: Normal, except as noted in detailed exam and in HPI  Examination    Left    Gait Normal   Musculoskeletal Minimal tender to palpation at 4th metatarsal fracture site    Skin Normal       Nails Normal    Range of Motion  Not tested due to fracture    Stability Stable    Muscle Strength Not tested due to fracture    Neurologic Normal    Sensation Intact to light touch throughout sural, saphenous, superficial peroneal, deep peroneal and medial/lateral plantar nerve distributions  Stratton-Rosario 5 07 filament (10g) testing deferred  Cardiovascular Brisk capillary refill < 2 seconds,intact DP and PT pulses    Special Tests None      Imaging Studies:     3 views of the Left foot were obtained, reviewed and interpreted independently which demonstrate stable 4th metatarsal fracture with interval healing evident  Reviewed by me personally  Toby Bougie Lachman, MD  Foot & Ankle Surgery   Department of [address] [place] [address]      I personally performed the service  Toby Bougie Lachman, MD    Scribe Attestation    I,:  Omayra Resendez am acting as a scribe while in the presence of the attending physician :       I,:  Marvin Reina MD personally performed the services described in this documentation    as scribed in my presence :

## 2022-07-22 ENCOUNTER — OFFICE VISIT (OUTPATIENT)
Dept: URGENT CARE | Facility: CLINIC | Age: 52
End: 2022-07-22
Payer: COMMERCIAL

## 2022-07-22 VITALS
DIASTOLIC BLOOD PRESSURE: 100 MMHG | OXYGEN SATURATION: 99 % | SYSTOLIC BLOOD PRESSURE: 190 MMHG | RESPIRATION RATE: 20 BRPM | HEART RATE: 83 BPM | TEMPERATURE: 97.6 F

## 2022-07-22 DIAGNOSIS — R39.9 UTI SYMPTOMS: Primary | ICD-10-CM

## 2022-07-22 DIAGNOSIS — R03.0 ELEVATED BLOOD PRESSURE READING: ICD-10-CM

## 2022-07-22 LAB
SL AMB  POCT GLUCOSE, UA: ABNORMAL
SL AMB LEUKOCYTE ESTERASE,UA: ABNORMAL
SL AMB POCT BILIRUBIN,UA: ABNORMAL
SL AMB POCT BLOOD,UA: ABNORMAL
SL AMB POCT CLARITY,UA: ABNORMAL
SL AMB POCT COLOR,UA: YELLOW
SL AMB POCT KETONES,UA: ABNORMAL
SL AMB POCT NITRITE,UA: ABNORMAL
SL AMB POCT PH,UA: 5
SL AMB POCT SPECIFIC GRAVITY,UA: 1.02
SL AMB POCT URINE PROTEIN: 30
SL AMB POCT UROBILINOGEN: 0.2

## 2022-07-22 PROCEDURE — 81002 URINALYSIS NONAUTO W/O SCOPE: CPT | Performed by: PHYSICIAN ASSISTANT

## 2022-07-22 PROCEDURE — 99213 OFFICE O/P EST LOW 20 MIN: CPT | Performed by: PHYSICIAN ASSISTANT

## 2022-07-22 PROCEDURE — 87186 SC STD MICRODIL/AGAR DIL: CPT | Performed by: PHYSICIAN ASSISTANT

## 2022-07-22 PROCEDURE — 87077 CULTURE AEROBIC IDENTIFY: CPT | Performed by: PHYSICIAN ASSISTANT

## 2022-07-22 PROCEDURE — 87086 URINE CULTURE/COLONY COUNT: CPT | Performed by: PHYSICIAN ASSISTANT

## 2022-07-22 RX ORDER — NITROFURANTOIN 25; 75 MG/1; MG/1
100 CAPSULE ORAL 2 TIMES DAILY
Qty: 14 CAPSULE | Refills: 0 | Status: SHIPPED | OUTPATIENT
Start: 2022-07-22 | End: 2022-07-29

## 2022-07-22 RX ORDER — ESCITALOPRAM OXALATE 10 MG/1
10 TABLET ORAL DAILY
COMMUNITY

## 2022-07-22 NOTE — PROGRESS NOTES
[facility]        NAME: Rosemarie Call is a 47 y o  female  : 1970    MRN: [id number]  DATE: 2022  TIME: 11:11 AM    Assessment and Plan   UTI symptoms [R39 9]  1  UTI symptoms  POCT urine dip    Urine culture    nitrofurantoin (MACROBID) 100 mg capsule   2  Elevated blood pressure reading           Patient Instructions   Patient Instructions   Take antibiotic as instructed for suspected UTI, will send for culture  Discussed elevated BP in office most likely related to missed dose of blood pressure medication this morning, to take medication as instructed, currently asymptomatic  Follow-up with PCP  Follow up with PCP in 3-5 days  Proceed to  ER if symptoms worsen  Chief Complaint     Chief Complaint   Patient presents with    Possible UTI     Pt reports of worsening UTI s/s with increased pressure and urinary frequency         History of Present Illness       Patient is a 50-year-old female presenting today with UTI symptoms times 2 days  Patient notes a few days ago she was experiencing some flank pain, notes a history of kidney stones but notes that flank pain has subsided, had a couple episodes of hematuria which is also subsided but is now experiencing increased frequency of urination and abdominal pressure, has not taken any medications or alleviating factors for symptoms  Denies fever, chills, N/V/D, lightheadedness, dizziness, chest pain, palpitations  Review of Systems   Review of Systems   Constitutional: Negative for chills and fever  HENT: Negative for sore throat  Respiratory: Negative for cough  Cardiovascular: Negative for chest pain  Gastrointestinal: Positive for abdominal pain  Negative for diarrhea, nausea and vomiting  Genitourinary: Positive for dysuria, flank pain (Since subsided), frequency and hematuria ( since subsided)  Musculoskeletal: Negative for arthralgias and myalgias  Skin: Negative for rash     Neurological: Negative for headaches           Current Medications       Current Outpatient Medications:     ALPRAZolam (XANAX) 1 mg tablet, Take by mouth 2 (two) times a day as needed for anxiety, Disp: , Rfl:     escitalopram (LEXAPRO) 10 mg tablet, Take 10 mg by mouth daily, Disp: , Rfl:     nitrofurantoin (MACROBID) 100 mg capsule, Take 1 capsule (100 mg total) by mouth 2 (two) times a day for 7 days, Disp: 14 capsule, Rfl: 0    amLODIPine (NORVASC) 10 mg tablet, Take 10 mg by mouth daily, Disp: , Rfl:     aspirin (ECOTRIN LOW STRENGTH) 81 mg EC tablet, Take 1 tablet (81 mg total) by mouth daily, Disp: 30 tablet, Rfl: 0    brompheniramine-pseudoephedrine-DM 30-2-10 MG/5ML syrup, Take 5 mL by mouth 4 (four) times a day as needed for allergies (Patient not taking: Reported on 12/7/2021 ), Disp: 120 mL, Rfl: 0    metoprolol succinate (TOPROL-XL) 50 mg 24 hr tablet, Take 50 mg by mouth daily, Disp: , Rfl:     ondansetron (Zofran ODT) 4 mg disintegrating tablet, Take 1 tablet (4 mg total) by mouth every 6 (six) hours as needed for nausea or vomiting (Patient not taking: Reported on 12/7/2021 ), Disp: 20 tablet, Rfl: 0    oxyCODONE-acetaminophen (Percocet) 5-325 mg per tablet, Take 1 tablet by mouth every 6 (six) hours as needed for moderate pain Max Daily Amount: 4 tablets (Patient not taking: Reported on 1/19/2022 ), Disp: 10 tablet, Rfl: 0    predniSONE 10 mg tablet, 3 tabs daily for 3 days, 2 tabs daily for 3 days, 1 tab daily for 3 days (Patient not taking: Reported on 12/7/2021 ), Disp: 18 tablet, Rfl: 0    Current Allergies     Allergies as of 07/22/2022 - Reviewed 07/22/2022   Allergen Reaction Noted    Adhesive [medical tape] Rash 02/20/2019    Betadine [povidone iodine] Rash 07/20/2021            The following portions of the patient's history were reviewed and updated as appropriate: allergies, current medications, past family history, past medical history, past social history, past surgical history and problem list  Past Medical History:   Diagnosis Date    Allergic rhinitis     Anxiety     Disease of thyroid gland     Hypertension     Kidney stones     left side    Migraine     teenager       Past Surgical History:   Procedure Laterality Date    APPENDECTOMY      CHOLECYSTECTOMY      HERNIA REPAIR      umbilical x 2    KNEE CARTILAGE SURGERY Right     and ACL    LAPAROSCOPIC GASTRIC BANDING      removed    OVARIAN CYST SURGERY      SHOULDER SURGERY Right     x 3       History reviewed  No pertinent family history  Medications have been verified  Objective   BP (!) 190/100   Pulse 83   Temp 97 6 °F (36 4 °C)   Resp 20   SpO2 99%        Physical Exam     Physical Exam  Vitals and nursing note reviewed  Constitutional:       Appearance: Normal appearance  HENT:      Head: Normocephalic and atraumatic  Right Ear: External ear normal       Left Ear: External ear normal    Eyes:      Conjunctiva/sclera: Conjunctivae normal    Cardiovascular:      Rate and Rhythm: Normal rate and regular rhythm  Pulses: Normal pulses  Heart sounds: Normal heart sounds  Pulmonary:      Effort: Pulmonary effort is normal       Breath sounds: Normal breath sounds  Abdominal:      General: Abdomen is flat  Bowel sounds are normal       Palpations: Abdomen is soft  Tenderness: There is no abdominal tenderness  There is no right CVA tenderness or left CVA tenderness  Skin:     General: Skin is warm  Capillary Refill: Capillary refill takes less than 2 seconds  Neurological:      General: No focal deficit present  Mental Status: She is alert and oriented to person, place, and time

## 2022-07-22 NOTE — PATIENT INSTRUCTIONS
Take antibiotic as instructed for suspected UTI, will send for culture  Discussed elevated BP in office most likely related to missed dose of blood pressure medication this morning, to take medication as instructed, currently asymptomatic  Follow-up with PCP

## 2022-07-23 ENCOUNTER — TELEPHONE (OUTPATIENT)
Dept: URGENT CARE | Facility: CLINIC | Age: 52
End: 2022-07-23

## 2022-07-23 DIAGNOSIS — R39.9 UTI SYMPTOMS: ICD-10-CM

## 2022-07-23 DIAGNOSIS — N39.0 URINARY TRACT INFECTION WITHOUT HEMATURIA, SITE UNSPECIFIED: Primary | ICD-10-CM

## 2022-07-23 RX ORDER — SULFAMETHOXAZOLE AND TRIMETHOPRIM 800; 160 MG/1; MG/1
1 TABLET ORAL EVERY 12 HOURS SCHEDULED
Qty: 10 TABLET | Refills: 0 | Status: SHIPPED | OUTPATIENT
Start: 2022-07-23 | End: 2022-07-28

## 2022-07-23 NOTE — TELEPHONE ENCOUNTER
Pt called allergic to betadine and macrobid sent in by Devi Diego  Pharmacy flagged it  Sent in bactrim ds BID x 5 days instead

## 2022-07-24 LAB — BACTERIA UR CULT: ABNORMAL

## 2023-01-25 ENCOUNTER — APPOINTMENT (OUTPATIENT)
Dept: RADIOLOGY | Facility: CLINIC | Age: 53
End: 2023-01-25

## 2023-01-25 ENCOUNTER — OFFICE VISIT (OUTPATIENT)
Dept: URGENT CARE | Facility: CLINIC | Age: 53
End: 2023-01-25

## 2023-01-25 VITALS
TEMPERATURE: 97.7 F | RESPIRATION RATE: 16 BRPM | BODY MASS INDEX: 35.02 KG/M2 | WEIGHT: 217 LBS | SYSTOLIC BLOOD PRESSURE: 132 MMHG | DIASTOLIC BLOOD PRESSURE: 90 MMHG | HEART RATE: 73 BPM | OXYGEN SATURATION: 96 %

## 2023-01-25 DIAGNOSIS — Z76.89 REFERRAL OF PATIENT: ICD-10-CM

## 2023-01-25 DIAGNOSIS — R05.1 ACUTE COUGH: ICD-10-CM

## 2023-01-25 DIAGNOSIS — U07.1 COVID-19: Primary | ICD-10-CM

## 2023-01-25 LAB
SARS-COV-2 AG UPPER RESP QL IA: POSITIVE
VALID CONTROL: ABNORMAL

## 2023-01-25 RX ORDER — FLUOXETINE HYDROCHLORIDE 20 MG/1
CAPSULE ORAL DAILY
COMMUNITY
Start: 2022-11-02

## 2023-01-25 RX ORDER — DEXTROMETHORPHAN HYDROBROMIDE AND PROMETHAZINE HYDROCHLORIDE 15; 6.25 MG/5ML; MG/5ML
5 SOLUTION ORAL
Qty: 118 ML | Refills: 0 | Status: SHIPPED | OUTPATIENT
Start: 2023-01-25

## 2023-01-25 NOTE — PROGRESS NOTES
[address] [facility]        NAME: Adán Dove is a 47 y o  female  : 1970    MRN: [id number]  DATE: 2023  TIME: 4:08 PM    Assessment and Plan   COVID-19 [U07 1]  1  COVID-19  Poct Covid 19 Rapid Antigen Test    XR chest pa & lateral    Promethazine-DM (PHENERGAN-DM) 6 25-15 mg/5 mL oral syrup      2  Referral of patient  Ambulatory Referral to Gynecology        CXR normal per my read  Positive for covid  Discussed possible s/es of prescribed medications including drowsiness and advised to only take before bed and never before working or driving  Discussed strict return to care precautions as well as red flag symptoms which should prompt immediate ED referral  Pt verbalized understanding and is in agreement with plan  Please follow up with your primary care provider within the next week  Please remember that your visit today was with an urgent care provider and should not replace follow up with your primary care provider for chronic medical issues or annual physicals  (Directly from [employer])  IF YOU TEST POSITIVE FOR COVID-19:  If you have symptoms, you can end isolation after 5 full days if you are fever-free for 24 hours without the use of fever-reducing medication and your other symptoms have improved  Loss of taste and/or smell may persist for weeks or more and should not delay the end of isolation  Your first day of symptoms is DAY ZERO  You should continue to wear a well-fitting mask (like N95, [id number]) both at home and in public for 5 additional days  Avoid people who are at high risk for severe disease for at least 10 days  DO NOT go to places where you are unable to wear a mask until a full 10 days from your first symptom  If you have no symptoms, quarantine for 5 days from the day you were tested  The day you were tested is DAY ZERO  Continue wearing a mask around others until day 10  If you develop symptoms, your 5-day isolation period starts over    If you have/had severe symptoms and/or a compromised immune system, you may need to isolate longer  Consult with your primary care provider about when you can resume being around other people  IF YOU HAVE HAD CLOSE EXPOSURE:  QUARANTINE IF: You are ages 25 or older and either have not been vaccinated, or have been vaccinated with your primary series but not the booster  You must quarantine for at least 5 days after your last contact  If you develop symptoms, get tested immediately  If you do not develop symptoms, get tested at least 5 days after your last exposure  Avoid people who are immunocompromised at high risk for severe disease until at least after 10 days  YOU DO NOT HAVE TO QUARANTINE IF:   • You are 18 years or older and have received all recommended vaccine doses, including boosters and additional primary shots for some immunocompromised people  • You are ages 9-17 and completed the primary series of COVID-19 vaccines  • You had confirmed COVID-19 within the last 90 days on a viral test   You should still wear a well-fitting mask around others for 10 days from the date of your last close exposure to COVID-19, and get tested at least 5 days later  Quarantine and isolation guidelines differ for healthcare professionals  Patient Instructions       Follow up with PCP in 3-5 days  Proceed to  ER if symptoms worsen      Chief Complaint     Chief Complaint   Patient presents with   • Cold Like Symptoms     Pt presents with sore throat that started on Saturday, cough, chest discomfort with coughing, decreased appetite          History of Present Illness       Ranjeet Pelayo is a(n) 47 y o  female presenting with URI symptoms x 4 days  Past medical history: HTN, anxiety, thyroid  Congestion: yes  Sore throat: yes  Cough: yes  Sputum production: yes, occasionally  Fever: yes, tmax 103 7F  Body aches: yes  Loss of smell/taste: yes  GI symptoms: yes diarrhea and some vomiting  Known sick contacts: yes, coworkers and has been visiting sick mom in the hospital  OTC meds tried: vit C, tylenol cold/flu  Vaccinated against COVID19: yes  Overall feels better than onset but still very persistent body aches, sore back and chest associated with cough  Had fever last night of 101F  Going through a lot of stress at the moment      Review of Systems   Review of Systems   Constitutional: Positive for chills, diaphoresis, fatigue and fever  HENT: Positive for congestion and sore throat  Negative for ear pain, postnasal drip, rhinorrhea, sinus pain, sneezing and trouble swallowing  Eyes: Negative for pain and redness  Respiratory: Positive for cough and wheezing (at night)  Negative for chest tightness and shortness of breath  Cardiovascular: Positive for chest pain (soreness)  Negative for leg swelling  Gastrointestinal: Positive for diarrhea and vomiting  Negative for nausea  Musculoskeletal: Positive for myalgias  Neurological: Positive for headaches  Negative for dizziness and weakness           Current Medications       Current Outpatient Medications:   •  ALPRAZolam (XANAX) 1 mg tablet, Take by mouth 2 (two) times a day as needed for anxiety, Disp: , Rfl:   •  FLUoxetine (PROzac) 20 mg capsule, Take by mouth daily, Disp: , Rfl:   •  Promethazine-DM (PHENERGAN-DM) 6 25-15 mg/5 mL oral syrup, Take 5 mL by mouth daily at bedtime as needed for cough, Disp: 118 mL, Rfl: 0  •  amLODIPine (NORVASC) 10 mg tablet, Take 10 mg by mouth daily, Disp: , Rfl:   •  metoprolol succinate (TOPROL-XL) 50 mg 24 hr tablet, Take 50 mg by mouth daily, Disp: , Rfl:     Current Allergies     Allergies as of 01/25/2023 - Reviewed 01/25/2023   Allergen Reaction Noted   • Adhesive [medical tape] Rash 02/20/2019   • Betadine [povidone iodine] Rash 07/20/2021            The following portions of the patient's history were reviewed and updated as appropriate: allergies, current medications, past family history, past medical history, past social history, past surgical history and problem list      Past Medical History:   Diagnosis Date   • Allergic rhinitis    • Anxiety    • Disease of thyroid gland    • Hypertension    • Kidney stones     left side   • Migraine     teenager       Past Surgical History:   Procedure Laterality Date   • APPENDECTOMY     • CHOLECYSTECTOMY     • HERNIA REPAIR      umbilical x 2   • KNEE CARTILAGE SURGERY Right     and ACL   • LAPAROSCOPIC GASTRIC BANDING      removed   • OVARIAN CYST SURGERY     • SHOULDER SURGERY Right     x 3       History reviewed  No pertinent family history  Medications have been verified  Objective   /90   Pulse 73   Temp 97 7 °F (36 5 °C)   Resp 16   Wt 98 4 kg (217 lb)   SpO2 96%   BMI 35 02 kg/m²        Physical Exam     Physical Exam  Vitals and nursing note reviewed  Constitutional:       General: She is not in acute distress  Appearance: Normal appearance  She is not toxic-appearing  HENT:      Head: Normocephalic and atraumatic  Right Ear: Tympanic membrane, ear canal and external ear normal       Left Ear: Tympanic membrane, ear canal and external ear normal       Nose: No congestion  Mouth/Throat:      Mouth: Mucous membranes are moist       Pharynx: Oropharynx is clear  No oropharyngeal exudate or posterior oropharyngeal erythema  Eyes:      Conjunctiva/sclera: Conjunctivae normal       Pupils: Pupils are equal, round, and reactive to light  Cardiovascular:      Rate and Rhythm: Normal rate and regular rhythm  Heart sounds: Normal heart sounds  Pulmonary:      Effort: Pulmonary effort is normal  No respiratory distress  Breath sounds: Normal breath sounds  No wheezing, rhonchi or rales  Abdominal:      General: Abdomen is flat  Palpations: Abdomen is soft  Musculoskeletal:      Cervical back: Normal range of motion and neck supple  Skin:     General: Skin is warm and dry  Capillary Refill: Capillary refill takes less than 2 seconds  Neurological:      Mental Status: She is alert and oriented to person, place, and time     Psychiatric:         Behavior: Behavior normal

## 2023-01-25 NOTE — LETTER
January 25, 2023     Patient: Savanah Sadler   YOB: 1970   Date of Visit: 1/25/2023       To Whom it May Concern:    Savanah Sadler was seen in my clinic on 1/25/2023  She may return to work on 01/28/2023  If you have any questions or concerns, please don't hesitate to call           Sincerely,          Jaylin Concepcion PA-C        CC: No Recipients

## 2023-03-10 ENCOUNTER — OFFICE VISIT (OUTPATIENT)
Dept: FAMILY MEDICINE CLINIC | Facility: CLINIC | Age: 53
End: 2023-03-10

## 2023-03-10 VITALS
SYSTOLIC BLOOD PRESSURE: 128 MMHG | RESPIRATION RATE: 18 BRPM | TEMPERATURE: 97.3 F | HEART RATE: 68 BPM | WEIGHT: 215 LBS | BODY MASS INDEX: 34.55 KG/M2 | HEIGHT: 66 IN | DIASTOLIC BLOOD PRESSURE: 82 MMHG

## 2023-03-10 DIAGNOSIS — N91.2 AMENORRHEA: ICD-10-CM

## 2023-03-10 DIAGNOSIS — E66.09 CLASS 1 OBESITY DUE TO EXCESS CALORIES WITH SERIOUS COMORBIDITY AND BODY MASS INDEX (BMI) OF 34.0 TO 34.9 IN ADULT: ICD-10-CM

## 2023-03-10 DIAGNOSIS — I10 ESSENTIAL HYPERTENSION: Primary | ICD-10-CM

## 2023-03-10 DIAGNOSIS — L40.9 PSORIASIS: ICD-10-CM

## 2023-03-10 DIAGNOSIS — Z12.31 SCREENING MAMMOGRAM, ENCOUNTER FOR: ICD-10-CM

## 2023-03-10 DIAGNOSIS — Z11.59 NEED FOR HEPATITIS C SCREENING TEST: ICD-10-CM

## 2023-03-10 DIAGNOSIS — R92.2 DENSE BREAST: ICD-10-CM

## 2023-03-10 DIAGNOSIS — F32.2 CURRENT SEVERE EPISODE OF MAJOR DEPRESSIVE DISORDER WITHOUT PSYCHOTIC FEATURES WITHOUT PRIOR EPISODE (HCC): ICD-10-CM

## 2023-03-10 PROBLEM — E66.811 CLASS 1 OBESITY DUE TO EXCESS CALORIES WITH SERIOUS COMORBIDITY AND BODY MASS INDEX (BMI) OF 34.0 TO 34.9 IN ADULT: Status: ACTIVE | Noted: 2023-03-10

## 2023-03-10 RX ORDER — FLUOXETINE 10 MG/1
10 CAPSULE ORAL DAILY
COMMUNITY
Start: 2023-03-01

## 2023-03-10 NOTE — ASSESSMENT & PLAN NOTE
Weight not at goal  Has had bariatric surgery  Will work on diet and exercise   May need pharmacotherapy

## 2023-03-10 NOTE — PROGRESS NOTES
Name: Gary Liriano      : 1970      MRN: [id number]  Encounter Provider: Tamara Peraza DO  Encounter Date: 3/10/2023   Encounter department: [address] [place] [address]     1  Essential hypertension  Assessment & Plan:  Blood pressure is well controlled  Managed by cardiologist    Orders:  -     CBC; Future  -     Comprehensive metabolic panel; Future  -     Lipid Panel with Direct LDL reflex; Future  -     CBC  -     Comprehensive metabolic panel  -     Lipid Panel with Direct LDL reflex    2  Amenorrhea  Comments:  Has been a long time since she had a period but she was able to get pregnant previously while not having periods  Orders:  -     FSH and LH; Future  -     FSH and LH    3  Screening mammogram, encounter for  -     Mammo screening bilateral w 3d & cad; Future; Expected date: 03/10/2023  -     US breast screening bilateral complete (ABUS); Future; Expected date: 03/10/2023    4  Dense breast  -     US breast screening bilateral complete (ABUS); Future; Expected date: 03/10/2023    5  Class 1 obesity due to excess calories with serious comorbidity and body mass index (BMI) of 34 0 to 34 9 in adult  Assessment & Plan:  Weight not at goal  Has had bariatric surgery  Will work on diet and exercise   May need pharmacotherapy      6  Current severe episode of major depressive disorder without psychotic features without prior episode [facility])  Assessment & Plan:  Managed by psychiatrist  We will continue fluoxetine      7  Psoriasis  Assessment & Plan:  Recurrent on her feet  Flares during times of stress    Orders:  -     halobetasol (ULTRAVATE) 0 05 % cream; Apply topically 2 (two) times a day    8  Need for hepatitis C screening test  -     Hepatitis C Antibody ([facility]); Future  -     Hepatitis C Antibody ([facility])        Return in about 1 month (around 4/10/2023) for Recheck  Subjective      She is following with a psychiatrists    The psychiatrists has her out on leave temporarily  She is worried about her weight  She has had a bariatric surgery  She is interested in starting medication    Review of Systems    Current Outpatient Medications on File Prior to Visit   Medication Sig   • ALPRAZolam (XANAX) 1 mg tablet Take by mouth 2 (two) times a day as needed for anxiety   • amLODIPine (NORVASC) 10 mg tablet Take 10 mg by mouth daily   • FLUoxetine (PROzac) 10 mg capsule Take 10 mg by mouth daily   • FLUoxetine (PROzac) 20 mg capsule Take by mouth daily   • metoprolol succinate (TOPROL-XL) 50 mg 24 hr tablet Take 50 mg by mouth daily   • [DISCONTINUED] halobetasol (ULTRAVATE) 0 05 % cream Apply topically 2 (two) times a day   • [DISCONTINUED] Promethazine-DM (PHENERGAN-DM) 6 25-15 mg/5 mL oral syrup Take 5 mL by mouth daily at bedtime as needed for cough (Patient not taking: Reported on 3/10/2023)       Objective     /82   Pulse 68   Temp (!) 97 3 °F (36 3 °C)   Resp 18   Ht 5' 6" (1 676 m)   Wt 97 5 kg (215 lb)   BMI 34 70 kg/m²     Physical Exam  Vitals and nursing note reviewed  Constitutional:       Appearance: She is well-developed  HENT:      Head: Normocephalic and atraumatic  Right Ear: Tympanic membrane and external ear normal       Left Ear: Tympanic membrane and external ear normal    Cardiovascular:      Rate and Rhythm: Normal rate and regular rhythm  Heart sounds: Normal heart sounds  No murmur heard  No friction rub  Pulmonary:      Effort: Pulmonary effort is normal  No respiratory distress  Breath sounds: Normal breath sounds  No wheezing or rales  Musculoskeletal:      Right lower leg: No edema  Left lower leg: No edema         Tiera Britton, DO

## 2023-03-13 ENCOUNTER — TELEPHONE (OUTPATIENT)
Dept: ADMINISTRATIVE | Facility: OTHER | Age: 53
End: 2023-03-13

## 2023-03-13 NOTE — TELEPHONE ENCOUNTER
----- Message from Jim Lr DO sent at 3/10/2023 10:41 AM EST -----  03/10/23 10:41 AM    Hello, our patient Huong Benz has had CRC: Colonoscopy completed/performed  Please assist in updating the patient chart by making an External outreach to Dr Amparo Green facility located in [place], [place]  The date of service is about 7-8 years ago    Phone:  [phone]    Thank you,  Jim Lr DO  [facility]

## 2023-03-13 NOTE — TELEPHONE ENCOUNTER
Upon review of the In Basket request and the patient's chart, initial outreach has been made via fax to facility  Please see Contacts section for details       Thank you  Saqib Hoskins

## 2023-03-13 NOTE — LETTER
Procedure Request Form: Colonoscopy      Date Requested: 23  Patient: Aline Kenton  Patient : 1970   Referring Provider: Prasanna Dominique, DO        Date of Procedure ______________________________       The above patient has informed us that they have completed their   most recent Colonoscopy at your facility  Please complete   this form and attach all corresponding procedure reports/results  Comments __________________________________________________________  ____________________________________________________________________  ____________________________________________________________________  ____________________________________________________________________    Facility Completing Procedure _________________________________________    Form Completed By (print name) _______________________________________      Signature __________________________________________________________      These reports are needed for  compliance  Please fax this completed form and a copy of the procedure report to our office located at [place] [address] as soon as possible to Fax [phone] oscar Lenz Fat: Phone [phone]    We thank you for your assistance in treating our mutual patient

## 2023-03-20 NOTE — TELEPHONE ENCOUNTER
As a follow-up, a second attempt has been made for outreach via fax to facility  Please see Contacts section for details      Thank you  Tony Asencio

## 2023-04-03 NOTE — TELEPHONE ENCOUNTER
As a final attempt, a third outreach has been made via telephone call to facility  Please see Contacts section for details  This encounter will be closed and completed by end of day  Should we receive the requested information because of previous outreach attempts, the requested patient's chart will be updated appropriately       Thank you  Catalina Emmanuel

## 2023-04-05 NOTE — TELEPHONE ENCOUNTER
Upon review of the In Basket request we have found that the document date is outside of the measure timeframe  Received Colonoscopy note from 2005  Patient had EGD done in 2018  Any additional questions or concerns should be emailed to the Practice Liaisons via the appropriate education email address, please do not reply via In Basket      Thank you  Mary Abarca

## 2023-04-07 LAB
ALBUMIN SERPL-MCNC: 4.5 G/DL (ref 3.8–4.9)
ALBUMIN/GLOB SERPL: 1.7 {RATIO} (ref 1.2–2.2)
ALP SERPL-CCNC: 64 IU/L (ref 44–121)
ALT SERPL-CCNC: 10 IU/L (ref 0–32)
AST SERPL-CCNC: 15 IU/L (ref 0–40)
BILIRUB SERPL-MCNC: 0.2 MG/DL (ref 0–1.2)
BUN SERPL-MCNC: 16 MG/DL (ref 6–24)
BUN/CREAT SERPL: 20 (ref 9–23)
CALCIUM SERPL-MCNC: 9.3 MG/DL (ref 8.7–10.2)
CHLORIDE SERPL-SCNC: 108 MMOL/L (ref 96–106)
CHOLEST SERPL-MCNC: 202 MG/DL (ref 100–199)
CO2 SERPL-SCNC: 24 MMOL/L (ref 20–29)
CREAT SERPL-MCNC: 0.8 MG/DL (ref 0.57–1)
EGFR: 88 ML/MIN/1.73
ERYTHROCYTE [DISTWIDTH] IN BLOOD BY AUTOMATED COUNT: 13.2 % (ref 11.7–15.4)
FSH SERPL-ACNC: 38.2 MIU/ML
GLOBULIN SER-MCNC: 2.7 G/DL (ref 1.5–4.5)
GLUCOSE SERPL-MCNC: 89 MG/DL (ref 70–99)
HCT VFR BLD AUTO: 42.3 % (ref 34–46.6)
HCV AB S/CO SERPL IA: NON REACTIVE
HDLC SERPL-MCNC: 63 MG/DL
HGB BLD-MCNC: 13.5 G/DL (ref 11.1–15.9)
LDLC SERPL CALC-MCNC: 122 MG/DL (ref 0–99)
LDLC/HDLC SERPL: 1.9 RATIO (ref 0–3.2)
LH SERPL-ACNC: 20.9 MIU/ML
MCH RBC QN AUTO: 27.4 PG (ref 26.6–33)
MCHC RBC AUTO-ENTMCNC: 31.9 G/DL (ref 31.5–35.7)
MCV RBC AUTO: 86 FL (ref 79–97)
MICRODELETION SYND BLD/T FISH: NORMAL
PLATELET # BLD AUTO: 391 X10E3/UL (ref 150–450)
POTASSIUM SERPL-SCNC: 4.9 MMOL/L (ref 3.5–5.2)
PROT SERPL-MCNC: 7.2 G/DL (ref 6–8.5)
RBC # BLD AUTO: 4.92 X10E6/UL (ref 3.77–5.28)
SL AMB VLDL CHOLESTEROL CALC: 17 MG/DL (ref 5–40)
SODIUM SERPL-SCNC: 146 MMOL/L (ref 134–144)
TRIGL SERPL-MCNC: 98 MG/DL (ref 0–149)
WBC # BLD AUTO: 7.6 X10E3/UL (ref 3.4–10.8)

## 2023-05-05 ENCOUNTER — OFFICE VISIT (OUTPATIENT)
Dept: FAMILY MEDICINE CLINIC | Facility: CLINIC | Age: 53
End: 2023-05-05

## 2023-05-05 VITALS
SYSTOLIC BLOOD PRESSURE: 130 MMHG | HEIGHT: 63 IN | BODY MASS INDEX: 37.74 KG/M2 | WEIGHT: 213 LBS | DIASTOLIC BLOOD PRESSURE: 88 MMHG | RESPIRATION RATE: 16 BRPM | HEART RATE: 68 BPM | TEMPERATURE: 96 F

## 2023-05-05 DIAGNOSIS — L40.9 PSORIASIS: ICD-10-CM

## 2023-05-05 DIAGNOSIS — I10 ESSENTIAL HYPERTENSION: ICD-10-CM

## 2023-05-05 DIAGNOSIS — E65 ABDOMINAL PANNICULUS: ICD-10-CM

## 2023-05-05 DIAGNOSIS — Z01.818 PRE-OP EXAMINATION: Primary | ICD-10-CM

## 2023-05-05 DIAGNOSIS — R74.8 ELEVATED LIVER ENZYMES: ICD-10-CM

## 2023-05-05 RX ORDER — HALOBETASOL PROPIONATE 0.05 %
OINTMENT (GRAM) TOPICAL 2 TIMES DAILY
Qty: 50 G | Refills: 1 | Status: SHIPPED | OUTPATIENT
Start: 2023-05-05

## 2023-05-05 NOTE — PROGRESS NOTES
FAMILY PRACTICE PRE-OPERATIVE EVALUATION  [facility]    NAME: Scott Sierra  AGE: 46 y o  SEX: female  : 1970     DATE: 2023    Family Practice Pre-Operative Evaluation      Chief Complaint: Pre-operative Evaluation     Surgery: ABDOMINOPLASTY (PANNICULECTOMY) AND INVERTED T-INCISION  Anticipated Date of Surgery: 2023  Surgeon: Dr Reva Funez     History of Present Illness:     HPI  Patient is here for preop clearance before upcoming surgery  Complaint with medications and tolerating it well  EKG and blood work reviewed  Liver enzymes ALT,AST very slightly went up compared to month ago blood work and patient denies any recent sickness and advised to follow with PCP in 4 to 6 weeks and should repeat liver enzyme blood work    Anesthesia:  general  Bleeding Risk: no recent abnormal bleeding and no remote history of abnormal bleeding  Current Anti-platelet/anticoagulation medication: none    Assessment of Cardiac Risk:  Denies unstable or severe angina or MI in the last 6 weeks or history of stent placement in the last year   Denies decompensated heart failure (e g  New onset heart failure, NYHA functional class IV heart failure, or worsening existing heart failure)  Denies significant arrhythmias such as high grade AV block, symptomatic ventricular arrhythmia, newly recognized ventricular tachycardia, supraventricular tachycardia with resting heart rate >100, or symptomatic bradycardia  Denies severe heart valve disease including aortic stenosis or symptomatic mitral stenosis     Exercise Capacity:  Able to walk 4 blocks without symptoms?: Yes  Able to walk 2 flights without symptoms?: Yes    Prior Anesthesia Reactions: No     Personal history of venous thromboembolic disease? No    History of steroid use for >2 weeks within last year? No         Review of Systems:     Review of Systems   HENT: Negative  Respiratory: Negative      Cardiovascular: Negative  Gastrointestinal: Negative for abdominal distention, abdominal pain, anal bleeding, blood in stool, constipation, diarrhea, nausea, rectal pain and vomiting  As noted in HPI     Genitourinary: Negative  Neurological: Negative  Psychiatric/Behavioral: Negative  Current Problem List:     Patient Active Problem List   Diagnosis    Essential hypertension    Anxiety    Current severe episode of major depressive disorder without psychotic features without prior episode (HCC)    Class 1 obesity due to excess calories with serious comorbidity and body mass index (BMI) of 34 0 to 34 9 in adult    Psoriasis       Allergies:      Allergies   Allergen Reactions    Adhesive [Medical Tape] Rash     welts and burns    Betadine [Povidone Iodine] Rash     Burn and swelling       Current Medications:       Current Outpatient Medications:     ALPRAZolam (XANAX) 1 mg tablet, Take by mouth 2 (two) times a day as needed for anxiety, Disp: , Rfl:     amLODIPine (NORVASC) 10 mg tablet, Take 10 mg by mouth daily, Disp: , Rfl:     halobetasol (ULTRAVATE) 0 05 % ointment, Apply topically 2 (two) times a day, Disp: 50 g, Rfl: 1    metoprolol succinate (TOPROL-XL) 50 mg 24 hr tablet, Take 50 mg by mouth daily, Disp: , Rfl:     sertraline (ZOLOFT) 100 mg tablet, , Disp: , Rfl:     Past Medical History:       Past Medical History:   Diagnosis Date    Allergic rhinitis     Anxiety     Disease of thyroid gland     Endometriosis     Fibroid     Hypertension     Kidney stones     left side    Migraine     teenager        Past Surgical History:   Procedure Laterality Date    APPENDECTOMY      CHOLECYSTECTOMY      HERNIA REPAIR      umbilical x 2    KNEE CARTILAGE SURGERY Right     and ACL    LAPAROSCOPIC GASTRIC BANDING      removed    OVARIAN CYST SURGERY      SHOULDER SURGERY Right     x 3        Family History   Problem Relation Age of Onset    Stroke Mother     Diabetes Father    Matty Raya "Coronary artery disease Father     Pancreatic cancer Father     Breast cancer Maternal Grandmother     Breast cancer Maternal Aunt     Breast cancer Paternal Aunt         Social History     Socioeconomic History    Marital status: Single     Spouse name: Not on file    Number of children: Not on file    Years of education: Not on file    Highest education level: Not on file   Occupational History    Not on file   Tobacco Use    Smoking status: Former     Packs/day: 0 25     Years: 44 00     Pack years: 11 00     Types: Cigarettes     Start date: 45     Quit date:      Years since quittin 3     Passive exposure: Past    Smokeless tobacco: Never   Vaping Use    Vaping Use: Never used   Substance and Sexual Activity    Alcohol use: Never    Drug use: Yes     Types: Marijuana     Comment: gummy    Sexual activity: Yes     Partners: Male     Birth control/protection: Post-menopausal   Other Topics Concern    Not on file   Social History Narrative    Not on file     Social Determinants of Health     Financial Resource Strain: Not on file   Food Insecurity: Not on file   Transportation Needs: Not on file   Physical Activity: Not on file   Stress: Not on file   Social Connections: Not on file   Intimate Partner Violence: Not on file   Housing Stability: Not on file        Physical Exam:     /88   Pulse 68   Temp (!) 96 °F (35 6 °C)   Resp 16   Ht 5' 3 25\" (1 607 m)   Wt 96 6 kg (213 lb)   BMI 37 43 kg/m²     Physical Exam  Vitals reviewed  Constitutional:       Appearance: She is well-developed  She is obese  Cardiovascular:      Rate and Rhythm: Normal rate and regular rhythm  Heart sounds: Normal heart sounds  Pulmonary:      Effort: Pulmonary effort is normal       Breath sounds: Normal breath sounds  Abdominal:      General: Bowel sounds are normal       Palpations: Abdomen is soft  Tenderness: There is no abdominal tenderness     Skin:     General: Skin is warm " and dry  Neurological:      Mental Status: She is alert and oriented to person, place, and time  Psychiatric:         Behavior: Behavior normal          Thought Content: Thought content normal          Judgment: Judgment normal           Data:     Pre-operative work-up    Laboratory Results: I have personally reviewed the pertinent laboratory results/reports      EKG: I have personally reviewed pertinent reports  Recent Results (from the past 672 hour(s))   Liquid-based pap, screening    Collection Time: 04/11/23  9:58 AM   Result Value Ref Range    Case Report       Gynecologic Cytology Report                       Case: [id number]                                  Authorizing Provider:  Andres Estrada MD     Collected:           04/11/2023 0958              Ordering Location:     [facility]  Received:            04/11/2023 [address] [employer]                                                        First Screen:          JOANN Ashley                                                    Specimen:    LIQUID-BASED PAP, SCREENING, Cervix, Endocervical                                          Primary Interpretation Negative for intraepithelial lesion or malignancy     Specimen Adequacy Satisfactory for evaluation  (See note)     Note       No endocervical cells identified  It is difficult to differentiate between squamous metaplastic cells and parabasal cells in atrophic specimens due to numerous causes including menopause, postpartum changes and progestational agents  Additional Information       [employer] FDA approved ,  and ThinPrep Imaging Duo System are utilized with strict adherence to the 's instruction manual to prepare gynecologic and non-gynecologic cytology specimens for the production of ThinPrep slides as well as for gynecologic ThinPrep imaging   These processes have been validated by our laboratory and/or by the   The Pap test is not a diagnostic procedure and should not be used as the sole means to detect cervical cancer  It is only a screening procedure to aid in the detection of cervical cancer and its precursors  Both false-negative and false-positive results have been experienced  Your patient's test result should be interpreted in this context together with the history and clinical findings  HPV High Risk    Collection Time: 04/11/23  9:58 AM    Specimen: Thin-Prep Vial   Result Value Ref Range    HPV Other HR Negative Negative    HPV16 Negative Negative    HPV18 Negative Negative           Assessment & Recommendations:     Problem List Items Addressed This Visit        Cardiovascular and Mediastinum    Essential hypertension       Musculoskeletal and Integument    Psoriasis    Relevant Medications    halobetasol (ULTRAVATE) 0 05 % ointment   Other Visit Diagnoses     Pre-op examination    -  Primary    Abdominal panniculus        Elevated liver enzymes        will follow back with PCP in 4 to 6 weeks for repeat blood work          Pre-Op Evaluation Assessment  46 y o  female with planned surgery: ABDOMINOPLASTY (PANNICULECTOMY) AND INVERTED T-INCISION    Known risk factors for perioperative complications: None  Current medications which may produce withdrawal symptoms if withheld perioperatively: none  Pre-Op Evaluation Plan  1  Further preoperative workup as follows:   - None; no further preoperative work-up is required    2  Medication Management/Recommendations:   - Patient has been instructed to avoid herbs or non-directed vitamins the week prior to surgery to ensure no drug interactions with perioperative surgical and anesthetic medications  - Patient should continue antihypertensive medications up through and including the day of surgery  - Patient should continue beta-blocker medication up through and including the day of surgery    - Patient has been instructed to avoid aspirin containing medications or non-steroidal anti-inflammatory drugs for the week preceding surgery  3  Prophylaxis for cardiac events with perioperative beta-blockers: already on metoprolol  4  Patient requires further consultation with: None    CHELLY Risk:  GFR:   eGFR   Date Value Ref Range Status   04/06/2023 88 >59 mL/min/1 73 Final         For PCP:  If GFR>60, Hold ACE/ARB/Diuretic on the day of surgery, and NSAIDS 10 days before  If GFR<45, Consider PRE and POST op Nephrology Consult  If 46 <GFR> 59 : Has Patient had CHELLY in last 6 Months? no   If YES: Preop Nephrology consult   If No:  [facility] Nephrology consult  Clearance  Patient is CLEARED for surgery without any additional cardiac testing       Julisa Barlow [address] [address]  Phone#  [phone]  Fax#  [phone]

## 2023-05-05 NOTE — PATIENT INSTRUCTIONS
Medication Management/Recommendations:   - Patient has been instructed to avoid herbs or non-directed vitamins the week prior to surgery to ensure no drug interactions with perioperative surgical and anesthetic medications  - Patient should continue antihypertensive medications up through and including the day of surgery  - Patient should continue beta-blocker medication up through and including the day of surgery  - Patient has been instructed to avoid aspirin containing medications or non-steroidal anti-inflammatory drugs for the week preceding surgery

## 2023-05-09 ENCOUNTER — TELEPHONE (OUTPATIENT)
Dept: FAMILY MEDICINE CLINIC | Facility: CLINIC | Age: 53
End: 2023-05-09

## 2023-05-09 NOTE — TELEPHONE ENCOUNTER
Sarwat Braxton from [facility] called yesterday at 8:52 am looking for the clearance for the patients upcoming surgery which is today    Please call Sarwat Braxton [phone]  Please fax to [phone]

## 2023-05-09 NOTE — TELEPHONE ENCOUNTER
Faxed pre op note  Called and informed [phone]  Please note fax confirmation    Renita Hinson LPN

## 2023-05-28 ENCOUNTER — HOSPITAL ENCOUNTER (EMERGENCY)
Facility: HOSPITAL | Age: 53
Discharge: HOME | End: 2023-05-29
Attending: EMERGENCY MEDICINE

## 2023-05-28 DIAGNOSIS — T85.698A BROKEN JACKSON-PRATT DRAIN, INITIAL ENCOUNTER: Primary | ICD-10-CM

## 2023-05-28 LAB
ALBUMIN SERPL BCP-MCNC: 3.8 G/DL (ref 3.5–5)
ALP SERPL-CCNC: 60 U/L (ref 34–104)
ALT SERPL W P-5'-P-CCNC: 11 U/L (ref 7–52)
ANION GAP SERPL CALCULATED.3IONS-SCNC: 8 MMOL/L (ref 4–13)
AST SERPL W P-5'-P-CCNC: 15 U/L (ref 13–39)
BASOPHILS # BLD AUTO: 0.18 THOUSANDS/ÂΜL (ref 0–0.1)
BASOPHILS NFR BLD AUTO: 2 % (ref 0–1)
BILIRUB SERPL-MCNC: 0.3 MG/DL (ref 0.2–1)
BUN SERPL-MCNC: 23 MG/DL (ref 5–25)
CALCIUM SERPL-MCNC: 8.9 MG/DL (ref 8.4–10.2)
CHLORIDE SERPL-SCNC: 103 MMOL/L (ref 96–108)
CO2 SERPL-SCNC: 26 MMOL/L (ref 21–32)
CREAT SERPL-MCNC: 1.05 MG/DL (ref 0.6–1.3)
EOSINOPHIL # BLD AUTO: 1.01 THOUSAND/ÂΜL (ref 0–0.61)
EOSINOPHIL NFR BLD AUTO: 9 % (ref 0–6)
ERYTHROCYTE [DISTWIDTH] IN BLOOD BY AUTOMATED COUNT: 13.3 % (ref 11.6–15.1)
GFR SERPL CREATININE-BSD FRML MDRD: 60 ML/MIN/1.73SQ M
GLUCOSE SERPL-MCNC: 101 MG/DL (ref 65–140)
HCT VFR BLD AUTO: 36.1 % (ref 34.8–46.1)
HGB BLD-MCNC: 11.4 G/DL (ref 11.5–15.4)
IMM GRANULOCYTES # BLD AUTO: 0.05 THOUSAND/UL (ref 0–0.2)
IMM GRANULOCYTES NFR BLD AUTO: 0 % (ref 0–2)
LACTATE SERPL-SCNC: 0.8 MMOL/L (ref 0.5–2)
LYMPHOCYTES # BLD AUTO: 2.87 THOUSANDS/ÂΜL (ref 0.6–4.47)
LYMPHOCYTES NFR BLD AUTO: 26 % (ref 14–44)
MCH RBC QN AUTO: 27.7 PG (ref 26.8–34.3)
MCHC RBC AUTO-ENTMCNC: 31.6 G/DL (ref 31.4–37.4)
MCV RBC AUTO: 88 FL (ref 82–98)
MONOCYTES # BLD AUTO: 0.83 THOUSAND/ÂΜL (ref 0.17–1.22)
MONOCYTES NFR BLD AUTO: 7 % (ref 4–12)
NEUTROPHILS # BLD AUTO: 6.23 THOUSANDS/ÂΜL (ref 1.85–7.62)
NEUTS SEG NFR BLD AUTO: 56 % (ref 43–75)
NRBC BLD AUTO-RTO: 0 /100 WBCS
PLATELET # BLD AUTO: 579 THOUSANDS/UL (ref 149–390)
PMV BLD AUTO: 10.1 FL (ref 8.9–12.7)
POTASSIUM SERPL-SCNC: 3.7 MMOL/L (ref 3.5–5.3)
PROT SERPL-MCNC: 7.5 G/DL (ref 6.4–8.4)
RBC # BLD AUTO: 4.12 MILLION/UL (ref 3.81–5.12)
SODIUM SERPL-SCNC: 137 MMOL/L (ref 135–147)
WBC # BLD AUTO: 11.17 THOUSAND/UL (ref 4.31–10.16)

## 2023-05-29 VITALS
TEMPERATURE: 97.9 F | DIASTOLIC BLOOD PRESSURE: 70 MMHG | HEIGHT: 63 IN | SYSTOLIC BLOOD PRESSURE: 132 MMHG | HEART RATE: 74 BPM | WEIGHT: 211.2 LBS | OXYGEN SATURATION: 99 % | RESPIRATION RATE: 18 BRPM | BODY MASS INDEX: 37.42 KG/M2

## 2023-05-29 LAB — HOLD SPECIMEN: NORMAL

## 2023-05-29 RX ORDER — MORPHINE SULFATE 4 MG/ML
4 INJECTION, SOLUTION INTRAMUSCULAR; INTRAVENOUS ONCE
Status: COMPLETED | OUTPATIENT
Start: 2023-05-29 | End: 2023-05-29

## 2023-05-29 RX ORDER — MORPHINE SULFATE 10 MG/ML
6 INJECTION, SOLUTION INTRAMUSCULAR; INTRAVENOUS ONCE
Status: COMPLETED | OUTPATIENT
Start: 2023-05-29 | End: 2023-05-29

## 2023-05-29 RX ORDER — ONDANSETRON 2 MG/ML
4 INJECTION INTRAMUSCULAR; INTRAVENOUS ONCE
Status: COMPLETED | OUTPATIENT
Start: 2023-05-29 | End: 2023-05-29

## 2023-05-29 RX ADMIN — MORPHINE SULFATE 4 MG: 4 INJECTION INTRAVENOUS at 01:43

## 2023-05-29 RX ADMIN — SODIUM CHLORIDE 1000 ML: 0.9 INJECTION, SOLUTION INTRAVENOUS at 00:24

## 2023-05-29 RX ADMIN — MORPHINE SULFATE 6 MG: 10 INJECTION INTRAVENOUS at 00:23

## 2023-05-29 RX ADMIN — ONDANSETRON 4 MG: 2 INJECTION INTRAMUSCULAR; INTRAVENOUS at 00:24

## 2023-05-29 NOTE — DISCHARGE INSTRUCTIONS
Call Dr Tito Pitts in the morning to notify him of your JEWELS drain tube malfunction  Return to the ER if you develop new fevers, new or worsening pain, inability to tolerate fluids, difficulty breathing, vomiting, or bleeding, or any new redness/swelling/warmth from your incision drainage sites

## 2023-05-29 NOTE — ED PROVIDER NOTES
"History  Chief Complaint   Patient presents with   • Post-op Problem     Patient had tummytuck surgery on 5/29 w/ placement of b/l drainage tube  Reports accidentally cutting drainage tube today while changing dressing  States abd cramping since that time  Patient is a 50-year-old female with PMH of HTN and nephrolithiasis s/p abdominoplasty 2 weeks ago presenting for evaluation of JEWELS drain dysfunction  Patient notes that she had a \"tummy tuck\" procedure done by Darius Hernandez approximately 2 weeks ago  She notes having gaping of the surgical incision site around the bellybutton which has caused some leakage of clear yellow fluid  She had seen her surgeon earlier today who notes that if it persists they will proceed with wound VAC placement, however that her overall exam was stable and she was doing well postoperatively  She notes having 2 JEWELS drains to bilateral left and right lower quadrants with 200 to 270 mL draining per day in total   This evening while changing her dressing to the right lower quadrant JEEWLS drain site, she accidentally cut the JEWELS drain  She notes taping it up but otherwise having leaking from the tube  She notes it is unable to function properly  She called her on-call surgeon and told them that she is going to the ER  She has not heard back from his office for further directions  She denies fevers, chills, headaches, CP/SOB, N/V/D, and urinary complaints  She notes some abdominal pain that is consistent with her postoperative pain without new features  She does express concern for air getting into her abdomen since the JEWELS drain was cut  She request that we reach out to her on-call surgeon and discuss neck steps as she was post to have these drains removed in 2 days  History provided by:  Patient and relative (Patient's son)   used: No        Prior to Admission Medications   Prescriptions Last Dose Informant Patient Reported? Taking?  " ALPRAZolam (XANAX) 1 mg tablet  Self Yes No   Sig: Take by mouth 2 (two) times a day as needed for anxiety   amLODIPine (NORVASC) 10 mg tablet  Self Yes No   Sig: Take 10 mg by mouth daily   halobetasol (ULTRAVATE) 0 05 % ointment   No No   Sig: Apply topically 2 (two) times a day   metoprolol succinate (TOPROL-XL) 50 mg 24 hr tablet  Self Yes No   Sig: Take 50 mg by mouth daily   sertraline (ZOLOFT) 100 mg tablet  Self Yes No      Facility-Administered Medications: None       Past Medical History:   Diagnosis Date   • Allergic rhinitis    • Anxiety    • Disease of thyroid gland    • Endometriosis    • Fibroid    • Hypertension    • Kidney stones     left side   • Migraine     teenager       Past Surgical History:   Procedure Laterality Date   • APPENDECTOMY     • CHOLECYSTECTOMY     • HERNIA REPAIR      umbilical x 2   • KNEE CARTILAGE SURGERY Right     and ACL   • LAPAROSCOPIC GASTRIC BANDING      removed   • OVARIAN CYST SURGERY     • SHOULDER SURGERY Right     x 3       Family History   Problem Relation Age of Onset   • Stroke Mother    • Diabetes Father    • Coronary artery disease Father    • Pancreatic cancer Father    • Breast cancer Maternal Grandmother    • Breast cancer Maternal Aunt    • Breast cancer Paternal Aunt      I have reviewed and agree with the history as documented      E-Cigarette/Vaping   • E-Cigarette Use Never User      E-Cigarette/Vaping Substances   • Nicotine No    • THC No    • CBD No    • Flavoring No    • Other No    • Unknown No      Social History     Tobacco Use   • Smoking status: Former     Packs/day: 0 25     Years: 44 00     Total pack years: 11 00     Types: Cigarettes     Start date: 45     Quit date:      Years since quittin 4     Passive exposure: Past   • Smokeless tobacco: Never   Vaping Use   • Vaping Use: Never used   Substance Use Topics   • Alcohol use: Never   • Drug use: Yes     Types: Marijuana     Comment: gummy       Review of Systems Constitutional: Negative for chills and fever  Respiratory: Negative for cough and shortness of breath  Cardiovascular: Negative for chest pain  Gastrointestinal: Positive for abdominal pain (Constant, unchanged from baseline since surgery)  Negative for diarrhea, nausea and vomiting  Musculoskeletal: Negative for back pain and gait problem  Skin: Positive for wound (Healing surgical incision sites; nonhealing wound at bellybutton draining clear fluid)  Negative for color change, pallor and rash  Neurological: Negative for dizziness, weakness, light-headedness and headaches  All other systems reviewed and are negative  Physical Exam  Physical Exam  Vitals and nursing note reviewed  Constitutional:       General: She is awake  She is not in acute distress (Evidencing mild discomfort, otherwise no acute distress)  Appearance: Normal appearance  She is well-developed  She is obese  She is not ill-appearing, toxic-appearing or diaphoretic  HENT:      Head: Normocephalic and atraumatic  Jaw: There is normal jaw occlusion  Nose: Nose normal       Mouth/Throat:      Lips: Pink  No lesions  Mouth: Mucous membranes are moist    Eyes:      General: Lids are normal  Vision grossly intact  Gaze aligned appropriately  Extraocular Movements: Extraocular movements intact  Conjunctiva/sclera: Conjunctivae normal    Neck:      Trachea: Phonation normal  No abnormal tracheal secretions  Cardiovascular:      Rate and Rhythm: Normal rate  Pulses:           Radial pulses are 2+ on the right side and 2+ on the left side  Dorsalis pedis pulses are 2+ on the right side and 2+ on the left side  Heart sounds: Normal heart sounds, S1 normal and S2 normal  No murmur heard  Pulmonary:      Effort: Pulmonary effort is normal  No tachypnea or respiratory distress  Breath sounds: Normal breath sounds and air entry   No stridor, decreased air movement or transmitted upper airway sounds  No decreased breath sounds  Abdominal:      General: There is no distension  Palpations: Abdomen is soft  Tenderness: There is generalized abdominal tenderness (very mild)  There is no guarding or rebound  Musculoskeletal:         General: Normal range of motion  Cervical back: Normal range of motion and neck supple  Right lower leg: No edema  Left lower leg: No edema  Comments: ROBLES, 5/5 strength throughout, sensation intact, no focal joint swelling  Ambulatory with steady gait  Skin:     General: Skin is warm and dry  Capillary Refill: Capillary refill takes less than 2 seconds  Findings: No rash or wound  Neurological:      General: No focal deficit present  Mental Status: She is alert and oriented to person, place, and time  Mental status is at baseline  GCS: GCS eye subscore is 4  GCS verbal subscore is 5  GCS motor subscore is 6  Psychiatric:         Behavior: Behavior is cooperative           Vital Signs  ED Triage Vitals [05/28/23 2159]   Temperature Pulse Respirations Blood Pressure SpO2   (!) 97 3 °F (36 3 °C) 82 16 140/77 98 %      Temp Source Heart Rate Source Patient Position - Orthostatic VS BP Location FiO2 (%)   Oral Monitor Sitting Right arm --      Pain Score       3           Vitals:    05/28/23 2230 05/28/23 2300 05/28/23 2330 05/29/23 0015   BP: 136/70 130/72 138/78 132/70   Pulse: 79 80 80 74   Patient Position - Orthostatic VS: Sitting Sitting Sitting Sitting         Visual Acuity      ED Medications  Medications   sodium chloride 0 9 % bolus 1,000 mL (0 mL Intravenous Stopped 5/29/23 0143)   ondansetron (ZOFRAN) injection 4 mg (4 mg Intravenous Given 5/29/23 0024)   morphine injection 6 mg (6 mg Intravenous Given 5/29/23 0023)   morphine injection 4 mg (4 mg Intravenous Given 5/29/23 0143)       Diagnostic Studies  Results Reviewed     Procedure Component Value Units Date/Time    [place] draw [[id number]] Collected: 05/28/23 2218    Lab Status: In process Specimen: Blood from Arm, Left Updated: 05/29/23 0001    Narrative: The following orders were created for panel order [place] draw  Procedure                               Abnormality         Status                     ---------                               -----------         ------                     Shabnam Elder Top on LKVC[[id number]]                           Final result               Gold top on TNIK[[id number]]                                 Final result               Green / Black tube on FUAO[[id number]]                       Final result               Lavender Top 7ml on LNCR[[id number]]                         In process                   Please view results for these tests on the individual orders  Lactic acid, plasma (w/reflex if result > 2 0) [[id number]]  (Normal) Collected: 05/28/23 2220    Lab Status: Final result Specimen: Blood from Arm, Left Updated: 05/28/23 2248     LACTIC ACID 0 8 mmol/L     Narrative:      Result may be elevated if tourniquet was used during collection      Comprehensive metabolic panel [[id number]] Collected: 05/28/23 2218    Lab Status: Final result Specimen: Blood from Arm, Left Updated: 05/28/23 2245     Sodium 137 mmol/L      Potassium 3 7 mmol/L      Chloride 103 mmol/L      CO2 26 mmol/L      ANION GAP 8 mmol/L      BUN 23 mg/dL      Creatinine 1 05 mg/dL      Glucose 101 mg/dL      Calcium 8 9 mg/dL      AST 15 U/L      ALT 11 U/L      Alkaline Phosphatase 60 U/L      Total Protein 7 5 g/dL      Albumin 3 8 g/dL      Total Bilirubin 0 30 mg/dL      eGFR 60 ml/min/1 73sq m     Narrative:      [facility] guidelines for Chronic Kidney Disease (CKD):   •  Stage 1 with normal or high GFR (GFR > 90 mL/min/1 73 square meters)  •  Stage 2 Mild CKD (GFR = 60-89 mL/min/1 73 square meters)  •  Stage 3A Moderate CKD (GFR = 45-59 mL/min/1 73 square meters)  •  Stage 3B Moderate CKD (GFR = 30-44 mL/min/1 73 square meters)  •  Stage 4 Severe CKD (GFR = 15-29 mL/min/1 73 square meters)  •  Stage 5 End Stage CKD (GFR <15 mL/min/1 73 square meters)  Note: GFR calculation is accurate only with a steady state creatinine    CBC and differential [[id number]]  (Abnormal) Collected: 05/28/23 2218    Lab Status: Final result Specimen: Blood from Arm, Left Updated: 05/28/23 2233     WBC 11 17 Thousand/uL      RBC 4 12 Million/uL      Hemoglobin 11 4 g/dL      Hematocrit 36 1 %      MCV 88 fL      MCH 27 7 pg      MCHC 31 6 g/dL      RDW 13 3 %      MPV 10 1 fL      Platelets 919 Thousands/uL      nRBC 0 /100 WBCs      Neutrophils Relative 56 %      Immat GRANS % 0 %      Lymphocytes Relative 26 %      Monocytes Relative 7 %      Eosinophils Relative 9 %      Basophils Relative 2 %      Neutrophils Absolute 6 23 Thousands/µL      Immature Grans Absolute 0 05 Thousand/uL      Lymphocytes Absolute 2 87 Thousands/µL      Monocytes Absolute 0 83 Thousand/µL      Eosinophils Absolute 1 01 Thousand/µL      Basophils Absolute 0 18 Thousands/µL                  No orders to display              Procedures  Procedures         ED Course  ED Course as of 05/29/23 1013   Sun May 28, 2023   2342 Triage vital signs stable   2342 First nurse orders placed for my evaluation   2342 Comprehensive metabolic panel  No electrolyte derangement, no CHELLY, no transaminitis   2342 LACTIC ACID: 0 8  WDL   2342 CBC and differential(!)  Mild leukocytosis without leftward shift, no gross anemia   Mon May 29, 2023   0050 Messaged pt's on call surgeon to discuss case   0126 No callback from on-call surgeon  However, Dr Jose Kahn came to bedside and was able to assist with the placing the JEWELS tube to the remaining extension that is in place  Now functioning as expected just with less length of tubing  Plan will be for discharge home with strict return precautions and close follow-up with her plastics bariatric surgeon  SBIRT 22yo+    Flowsheet Row Most Recent Value   Initial Alcohol Screen: US AUDIT-C     1  How often do you have a drink containing alcohol? 0 Filed at: 05/28/2023 2201   2  How many drinks containing alcohol do you have on a typical day you are drinking? 0 Filed at: 05/28/2023 2201   3a  Male UNDER 65: How often do you have five or more drinks on one occasion? 0 Filed at: 05/28/2023 2201   3b  FEMALE Any Age, or MALE 65+: How often do you have 4 or more drinks on one occassion? 0 Filed at: 05/28/2023 2201   Audit-C Score 0 Filed at: 05/28/2023 2201   TEDDY: How many times in the past year have you    Used an illegal drug or used a prescription medication for non-medical reasons? Never Filed at: 05/28/2023 2201                    Medical Decision Making  DDx including but not limited to: JEWELS drain malfunction, wound dehiscence; considered but doubt acute infectious intra-abdominal process    Patient is a 16-year-old female presenting for evaluation of accidentally cutting through her JEWELS drain  Her triage vital signs are stable  On initial examination, she is evidencing no discomfort and is resting comfortably in bed  She expresses generalized abdominal pain which is consistent with her postoperative pain without new features  Plan for 6 mg IV morphine as she does not have her home pain medications here and to reach out to her surgeon  I discussed case with Dr Analilia Rivas who came to bedside and was able to remove JEWELS bulb from the extension tubing and replace onto current remaining tubing exiting from the right lower quadrant  JEWELS tube now draining actively without complication  Given patient's vital signs are stable her blood work is overall reassuring, and without new infectious signs or symptoms including absence of cellulitic changes and purulent drainage, plan for discharge home with close follow-up with her surgeon  She is in agreement with that plan   DC paperwork reviewed emphasis on follow-up with surgery, and strict return precautions  Patient demonstrates understanding teach back method  She overall appears well, in no acute stress, and is ambulatory with steady gait at time of discharge  Broken Rj-Alcantara drain, initial encounter: acute illness or injury  Amount and/or Complexity of Data Reviewed  Labs: ordered  Decision-making details documented in ED Course  Risk  OTC drugs  Prescription drug management  Disposition  Final diagnoses:   Broken Rj-Alcantara drain, initial encounter     Time reflects when diagnosis was documented in both MDM as applicable and the Disposition within this note     Time User Action Codes Description Comment    5/29/2023  1:30 AM Joan Kauffman Add [[id number]] Broken Rj-Alcantara drain, initial encounter       ED Disposition     ED Disposition   Discharge    Condition   Stable    Date/Time   Mon May 29, 2023  1:30 AM    Comment   Dwight Petersen discharge to home/self care                 Follow-up Information     Follow up With Specialties Details Why Contact Info Additional Information    Lena Lance  Call today  [address] [facility]  3rd floor  [address]  [facility]  [phone]       [facility] Emergency Medicine Go to  If symptoms worsen [address] Emergency Department, [address], [place], [place], [address]          Discharge Medication List as of 5/29/2023  1:32 AM      CONTINUE these medications which have NOT CHANGED    Details   ALPRAZolam (XANAX) 1 mg tablet Take by mouth 2 (two) times a day as needed for anxiety, Historical Med      amLODIPine (NORVASC) 10 mg tablet Take 10 mg by mouth daily, Starting Mon 5/24/2021, Until Fri 5/5/2023, Historical Med      halobetasol (ULTRAVATE) 0 05 % ointment Apply topically 2 (two) times a day, Starting Fri 5/5/2023, Normal      metoprolol succinate (TOPROL-XL) 50 mg 24 hr tablet Take 50 mg by mouth daily, Starting Mon 5/24/2021, Until Fri 5/5/2023, Historical Med      sertraline (ZOLOFT) 100 mg tablet Starting Fri 4/7/2023, Historical Med             No discharge procedures on file      PDMP Review       Value Time User    PDMP Reviewed  Yes 5/29/2023 12:08 AM DALLAS Pineda          ED Provider  Electronically Signed by           DALLAS Pineda  05/29/23 1013

## 2023-06-06 ENCOUNTER — OFFICE VISIT (OUTPATIENT)
Dept: WOUND CARE | Facility: HOSPITAL | Age: 53
End: 2023-06-06
Payer: COMMERCIAL

## 2023-06-06 VITALS
SYSTOLIC BLOOD PRESSURE: 130 MMHG | DIASTOLIC BLOOD PRESSURE: 85 MMHG | WEIGHT: 209 LBS | HEART RATE: 65 BPM | BODY MASS INDEX: 35.68 KG/M2 | RESPIRATION RATE: 18 BRPM | HEIGHT: 64 IN | TEMPERATURE: 97.4 F

## 2023-06-06 DIAGNOSIS — E66.09 CLASS 1 OBESITY DUE TO EXCESS CALORIES WITH SERIOUS COMORBIDITY AND BODY MASS INDEX (BMI) OF 34.0 TO 34.9 IN ADULT: ICD-10-CM

## 2023-06-06 DIAGNOSIS — Z98.890 H/O ABDOMINOPLASTY: ICD-10-CM

## 2023-06-06 DIAGNOSIS — Z98.890 H/O HERNIA REPAIR: ICD-10-CM

## 2023-06-06 DIAGNOSIS — T81.89XA NON-HEALING SURGICAL WOUND, INITIAL ENCOUNTER: Primary | ICD-10-CM

## 2023-06-06 DIAGNOSIS — Z87.19 H/O HERNIA REPAIR: ICD-10-CM

## 2023-06-06 PROCEDURE — 99214 OFFICE O/P EST MOD 30 MIN: CPT | Performed by: STUDENT IN AN ORGANIZED HEALTH CARE EDUCATION/TRAINING PROGRAM

## 2023-06-06 PROCEDURE — 97597 DBRDMT OPN WND 1ST 20 CM/<: CPT | Performed by: STUDENT IN AN ORGANIZED HEALTH CARE EDUCATION/TRAINING PROGRAM

## 2023-06-06 PROCEDURE — 99204 OFFICE O/P NEW MOD 45 MIN: CPT | Performed by: STUDENT IN AN ORGANIZED HEALTH CARE EDUCATION/TRAINING PROGRAM

## 2023-06-06 RX ORDER — LIDOCAINE HYDROCHLORIDE 40 MG/ML
5 SOLUTION TOPICAL ONCE
Status: COMPLETED | OUTPATIENT
Start: 2023-06-06 | End: 2023-06-06

## 2023-06-06 RX ADMIN — LIDOCAINE HYDROCHLORIDE 5 ML: 40 SOLUTION TOPICAL at 13:46

## 2023-06-06 NOTE — PATIENT INSTRUCTIONS
Orders Placed This Encounter   Procedures    Wound cleansing and dressings     Wound location Pelvic Surgical line and Left Lower abdomen   Change dressing 3 times weekly   Sponge bathe only, do not shower at this time  Cleanse the wound with normal saline solution, pat dry  Apply silver alignate to wound  Cover with gauze   Secure with tape    This was applied today at the [facility]  Standing Status:   Future     Standing Expiration Date:   6/6/2024    Wound miscellaneous orders     Increase protein intake to 3-4 servings for promotion of wound healing  Continue to maintain surgical appts  Standing Status:   Future     Standing Expiration Date:   6/6/2024    Debridement     This order was created via procedure documentation    Debridement     This order was created via procedure documentation    Wound cleansing and dressings     Wound location Midline abdomen (Umbilical)  Change dressing 3 times weekly   Sponge bathe only, do not shower at this time  Cleanse the wound with normal saline solution, pat dry  Pack wound gently with aquacel ag rope, tape tail to periwound  Cover and Secure with Allevyn foam      This was applied today at the [facility]  Wound Mechanically debrided today with gauze and saline       Standing Status:   Future     Standing Expiration Date:   6/6/2024

## 2023-06-06 NOTE — PROGRESS NOTES
Patient ID: Errol Pedroza is a 46 y o  female Date of Birth 1970     Chief Complaint  Chief Complaint   Patient presents with   • New Patient Visit     Abdominal wound       Allergies  Adhesive [medical tape] and Betadine [povidone iodine]    Assessment:     Diagnoses and all orders for this visit:    Non-healing surgical wound, initial encounter  -     lidocaine (XYLOCAINE) 4 % topical solution 5 mL  -     Wound cleansing and dressings; Future  -     Wound cleansing and dressings; Future  -     Wound miscellaneous orders; Future  -     Debridement  -     Debridement    Class 1 obesity due to excess calories with serious comorbidity and body mass index (BMI) of 34 0 to 34 9 in adult    H/O abdominoplasty    H/O hernia repair              Debridement   Wound 06/06/23 Surgical Pelvis Anterior    [place] Protocol:  Consent: Verbal consent obtained  Risks and benefits: risks, benefits and alternatives were discussed  Consent given by: patient  Patient identity confirmed: verbally with patient      Performed by: physician  Debridement type: selective  Pain control: lidocaine 4%  Pre-debridement measurements  Length (cm): 3  Width (cm): 0 5  Depth (cm): 0 2  Surface Area (cm^2): 1 5  Volume (cm^3): 0 3    Post-debridement measurements  Length (cm): 3  Width (cm): 0 5  Depth (cm): 0 2  Percent debrided: 80%  Surface Area (cm^2): 1 5  Area debrided (cm^2): 1 2  Volume (cm^3): 0 3  Devitalized tissue debrided: biofilm, exudate and fibrin  Instrument(s) utilized: curette  Bleeding: small  Hemostasis obtained with: pressure  Procedural pain (0-10): 2  Post-procedural pain: 1   Response to treatment: procedure was tolerated well    Debridement   Wound 06/06/23 Surgical Abdomen Left; Lower    Universal Protocol:  Consent: Verbal consent obtained    Risks and benefits: risks, benefits and alternatives were discussed  Consent given by: patient  Patient identity confirmed: verbally with patient      Performed by: physician  Debridement type: selective  Pain control: lidocaine 4%  Pre-debridement measurements  Length (cm): 1  Width (cm): 2  Depth (cm): 0 4  Surface Area (cm^2): 2  Volume (cm^3): 0 8    Post-debridement measurements  Length (cm): 1  Width (cm): 2  Depth (cm): 0 4  Percent debrided: 70%  Surface Area (cm^2): 2  Area debrided (cm^2): 1 4  Volume (cm^3): 0 8  Devitalized tissue debrided: biofilm, exudate and fibrin  Instrument(s) utilized: curette  Bleeding: small  Hemostasis obtained with: pressure  Procedural pain (0-10): 2  Post-procedural pain: 1   Response to treatment: procedure was tolerated well          Plan:  •  It was a pleasure to see Kierra Mccormack today for initial care of her wounds today  • Selective debridement performed today as above to lower abdominal wounds  Midline upper abdominal wound cleansed with NS and gauze  • Start plan of care as noted below with aquacel ag rope under allevyn foam  Silver alginate to both lower wounds  • Patient states that she referred to our center for daily wound care  I advised her that we do not perform daily wound care, and unfortunately, she does not qualify for VNA services as she is not home bound  Her surgeon will be providing most care for these wounds given that both sutures and drains are still in place  I will defer to him for management of drain and suture removal  We can provide support with directions on wound care dressings  Given the amount of exudate in the primary wound, she will require daily changes to be performed by herself of her son  Detailed instructions given today, and patient and family shown how to dress the wound  Written instructions given as well  Will re-evaluate wound next visit  Patient may be candidate for wound vac  • No signs or symptoms of infection today   Patient understands that if any signs of infection start (such as increased redness, drainage, pain, fever, chills, diaphoresis), they should call our office or proceed to the ER or Urgent Care  • Patient should continue a high protein diet to facilitate wound healing  • Patient is advised to not submerge wound or leave wound open to air  • Follow up in 1 week  • Given the multi-factorial nature of wound care, additional time was taken to review patient's treatment plan with other specialties and most recent pertinent lab work and imaging  • All plans of care discussed with patient at bedside who verbalized understanding with treatment plan  Wound 06/06/23 Surgical Abdomen Medial (Active)   Wound Image Images linked 06/06/23 1337   Wound Description Slough; Yellow;Pink 06/06/23 1337   Sindi-wound Assessment Intact 06/06/23 1337   Wound Length (cm) 1 5 cm 06/06/23 1337   Wound Width (cm) 2 4 cm 06/06/23 1337   Wound Depth (cm) 1 4 cm 06/06/23 1337   Wound Surface Area (cm^2) 3 6 cm^2 06/06/23 1337   Wound Volume (cm^3) 5 04 cm^3 06/06/23 1337   Calculated Wound Volume (cm^3) 5 04 cm^3 06/06/23 1337   Wound Site Closure Sutures 06/06/23 1337   Drainage Amount Large 06/06/23 1337   Drainage Description Serosanguineous; Deluna 06/06/23 1337   Non-staged Wound Description Full thickness 06/06/23 1337   Wound packed? Yes 06/06/23 1337   Packing- # removed 1 06/06/23 1337   Dressing Status Intact 06/06/23 1337       Wound 06/06/23 Surgical Pelvis Anterior (Active)   Wound Image Images linked 06/06/23 1340   Wound Description Epithelialization;Slough;Pink;Yellow 06/06/23 1340   Sindi-wound Assessment Intact 06/06/23 1340   Wound Length (cm) 3 cm 06/06/23 1340   Wound Width (cm) 0 5 cm 06/06/23 1340   Wound Depth (cm) 0 2 cm 06/06/23 1340   Wound Surface Area (cm^2) 1 5 cm^2 06/06/23 1340   Wound Volume (cm^3) 0 3 cm^3 06/06/23 1340   Calculated Wound Volume (cm^3) 0 3 cm^3 06/06/23 1340   Drainage Amount Moderate 06/06/23 1340   Drainage Description Serosanguineous; Deluna 06/06/23 1340   Non-staged Wound Description Full thickness 06/06/23 1340   Dressing Status Intact 06/06/23 1340       Wound 06/06/23 Surgical Abdomen Left; Lower (Active)   Wound Image Images linked 06/06/23 1409   Wound Description Slough;Pink 06/06/23 1409   Sindi-wound Assessment Intact 06/06/23 1409   Wound Length (cm) 1 cm 06/06/23 1409   Wound Width (cm) 2 cm 06/06/23 1409   Wound Depth (cm) 0 4 cm 06/06/23 1409   Wound Surface Area (cm^2) 2 cm^2 06/06/23 1409   Wound Volume (cm^3) 0 8 cm^3 06/06/23 1409   Calculated Wound Volume (cm^3) 0 8 cm^3 06/06/23 1409   Drainage Amount Moderate 06/06/23 1409   Drainage Description Serosanguineous; Tan 06/06/23 1409   Non-staged Wound Description Full thickness 06/06/23 1409   Dressing Status Intact 06/06/23 1409       Wound 06/06/23 Surgical Abdomen Medial (Active)   Date First Assessed/Time First Assessed: 06/06/23 1334   Present on Original Admission: Yes  Primary Wound Type: Surgical  Location: Abdomen  Wound Location Orientation: Medial       Wound 06/06/23 Surgical Pelvis Anterior (Active)   Date First Assessed/Time First Assessed: 06/06/23 1335   Present on Original Admission: Yes  Primary Wound Type: Surgical  Location: Pelvis  Wound Location Orientation: Anterior       Wound 06/06/23 Surgical Abdomen Left; Lower (Active)   Date First Assessed/Time First Assessed: 06/06/23 1407   Present on Original Admission: Yes  Primary Wound Type: Surgical  Location: Abdomen  Wound Location Orientation: Left; Lower       Subjective:      47 yo F  here today for non healing surgical wound  She has laparscopic gastric sleeve in 2015 with subsequent weight loss leading to abdominoplasty, hernia repair, pannectomy and liposuction in May 2023  This was all done through [facility]  She underwent debridement on 6/1 of umbilical necrosis with drain placement  L sided drain was recently removed and R side drain still draining copiously  Incision lines were made in an inverted T pattern with wound dehiscence of the both lines  Sutures are still in place    Patient was referred to wound management by her plastic surgeon for continued care of her wounds  The following portions of the patient's history were reviewed and updated as appropriate: allergies, current medications, past family history, past medical history, past social history, past surgical history and problem list     Review of Systems   Skin: Positive for wound  All other systems reviewed and are negative  Objective:       Wound 06/06/23 Surgical Abdomen Medial (Active)   Wound Image Images linked 06/06/23 1337   Wound Description Slough; Yellow;Pink 06/06/23 1337   Sindi-wound Assessment Intact 06/06/23 1337   Wound Length (cm) 1 5 cm 06/06/23 1337   Wound Width (cm) 2 4 cm 06/06/23 1337   Wound Depth (cm) 1 4 cm 06/06/23 1337   Wound Surface Area (cm^2) 3 6 cm^2 06/06/23 1337   Wound Volume (cm^3) 5 04 cm^3 06/06/23 1337   Calculated Wound Volume (cm^3) 5 04 cm^3 06/06/23 1337   Wound Site Closure Sutures 06/06/23 1337   Drainage Amount Large 06/06/23 1337   Drainage Description Serosanguineous; Deluna 06/06/23 1337   Non-staged Wound Description Full thickness 06/06/23 1337   Wound packed? Yes 06/06/23 1337   Packing- # removed 1 06/06/23 1337   Dressing Status Intact 06/06/23 1337       Wound 06/06/23 Surgical Pelvis Anterior (Active)   Wound Image Images linked 06/06/23 1340   Wound Description Epithelialization;Slough;Pink;Yellow 06/06/23 1340   Sindi-wound Assessment Intact 06/06/23 1340   Wound Length (cm) 3 cm 06/06/23 1340   Wound Width (cm) 0 5 cm 06/06/23 1340   Wound Depth (cm) 0 2 cm 06/06/23 1340   Wound Surface Area (cm^2) 1 5 cm^2 06/06/23 1340   Wound Volume (cm^3) 0 3 cm^3 06/06/23 1340   Calculated Wound Volume (cm^3) 0 3 cm^3 06/06/23 1340   Drainage Amount Moderate 06/06/23 1340   Drainage Description Serosanguineous; Deluna 06/06/23 1340   Non-staged Wound Description Full thickness 06/06/23 1340   Dressing Status Intact 06/06/23 1340       Wound 06/06/23 Surgical Abdomen Left; Lower (Active)   Wound Image Images linked "06/06/23 1409   Wound Description Slough;Pink 06/06/23 1409   Sindi-wound Assessment Intact 06/06/23 1409   Wound Length (cm) 1 cm 06/06/23 1409   Wound Width (cm) 2 cm 06/06/23 1409   Wound Depth (cm) 0 4 cm 06/06/23 1409   Wound Surface Area (cm^2) 2 cm^2 06/06/23 1409   Wound Volume (cm^3) 0 8 cm^3 06/06/23 1409   Calculated Wound Volume (cm^3) 0 8 cm^3 06/06/23 1409   Drainage Amount Moderate 06/06/23 1409   Drainage Description Serosanguineous; Tan 06/06/23 1409   Non-staged Wound Description Full thickness 06/06/23 1409   Dressing Status Intact 06/06/23 1409       /85   Pulse 65   Temp (!) 97 4 °F (36 3 °C)   Resp 18   Ht 5' 4\" (1 626 m)   Wt 94 8 kg (209 lb)   BMI 35 87 kg/m²     Physical Exam  Vitals reviewed  Constitutional:       Appearance: Normal appearance  She is obese  HENT:      Head: Normocephalic and atraumatic  Mouth/Throat:      Mouth: Mucous membranes are moist    Eyes:      Extraocular Movements: Extraocular movements intact  Pulmonary:      Effort: Pulmonary effort is normal    Skin:     Comments: Multiple wounds over abdomen    Drain in place in RLQ  Adhesive tape not removed  Neurological:      Mental Status: She is alert  Gait: Gait abnormal    Psychiatric:         Mood and Affect: Mood normal          Behavior: Behavior normal            Wound Instructions:  Orders Placed This Encounter   Procedures   • Wound cleansing and dressings     Wound location Midline abdomen (Umbilical)  Change dressing 3 times weekly   Sponge bathe only, do not shower at this time  Cleanse the wound with normal saline solution, pat dry  Pack wound gently with aquacel ag rope, tape tail to periwound  Cover and Secure with Allevyn foam      This was applied [facility] at the [facility]       Standing Status:   Future     Standing Expiration Date:   6/6/2024   • Wound cleansing and dressings     Wound location Pelvic Surgical line and Left Lower abdomen   Change dressing 3 times weekly   Sponge " bathe only, do not shower at this time  Cleanse the wound with normal saline solution, pat dry  Apply silver alignate to wound  Cover with gauze   Secure with tape    This was applied today at the [facility]  Standing Status:   Future     Standing Expiration Date:   6/6/2024   • Wound miscellaneous orders     Increase protein intake to 3-4 servings for promotion of wound healing  Continue to maintain surgical appts  Standing Status:   Future     Standing Expiration Date:   6/6/2024   • Debridement     This order was created via procedure documentation   • Debridement     This order was created via procedure documentation        Diagnosis ICD-10-CM Associated Orders   1  Non-healing surgical wound, initial encounter  [id number] [id number] lidocaine (XYLOCAINE) 4 % topical solution 5 mL     Wound cleansing and dressings     Wound cleansing and dressings     Wound miscellaneous orders     Debridement     Debridement      2  Class 1 obesity due to excess calories with serious comorbidity and body mass index (BMI) of 34 0 to 34 9 in adult  E66 [id number]     Z68 34       3  H/O abdominoplasty  [id number] [id number]       4   H/O hernia repair  [id number]     Z87 19

## 2023-08-01 ENCOUNTER — HOSPITAL ENCOUNTER (OUTPATIENT)
Dept: RADIOLOGY | Facility: HOSPITAL | Age: 53
Discharge: HOME | End: 2023-08-01
Payer: COMMERCIAL

## 2023-08-01 VITALS — BODY MASS INDEX: 31.07 KG/M2 | HEIGHT: 64 IN | WEIGHT: 182 LBS

## 2023-08-01 DIAGNOSIS — Z12.31 SCREENING MAMMOGRAM, ENCOUNTER FOR: ICD-10-CM

## 2023-08-01 DIAGNOSIS — R92.2 DENSE BREAST: ICD-10-CM

## 2023-08-01 PROCEDURE — 77063 BREAST TOMOSYNTHESIS BI: CPT

## 2023-08-01 PROCEDURE — 76641 ULTRASOUND BREAST COMPLETE: CPT

## 2023-08-01 PROCEDURE — 77067 SCR MAMMO BI INCL CAD: CPT

## 2023-08-02 ENCOUNTER — TELEPHONE (OUTPATIENT)
Dept: FAMILY MEDICINE CLINIC | Facility: CLINIC | Age: 53
End: 2023-08-02

## 2023-08-02 NOTE — TELEPHONE ENCOUNTER
Left message, Pt wants referral for an ophthalmologist. She's getting bad headaches since she had her glasses at [facility]. She is also under a lot of stress, so she's not sure if she should see you first.  Sukhwinder Swenson

## 2023-08-03 NOTE — TELEPHONE ENCOUNTER
Please recommend Dr. Noreen Rushing and Dr. Almaz Jimenez  Please also have her schedule a CPE with me  Thank you,  Pau Roca, DO

## 2023-08-03 NOTE — TELEPHONE ENCOUNTER
Lmom for patient relaying the information in the message from Dr. Danial Ortiz.     No further action needed

## 2023-09-26 DIAGNOSIS — L40.9 PSORIASIS: ICD-10-CM

## 2023-09-26 RX ORDER — HALOBETASOL PROPIONATE 0.05 %
OINTMENT (GRAM) TOPICAL 2 TIMES DAILY
Qty: 50 G | Refills: 1 | Status: SHIPPED | OUTPATIENT
Start: 2023-09-26

## 2023-10-13 ENCOUNTER — OFFICE VISIT (OUTPATIENT)
Dept: FAMILY MEDICINE CLINIC | Facility: CLINIC | Age: 53
End: 2023-10-13
Payer: COMMERCIAL

## 2023-10-13 VITALS
OXYGEN SATURATION: 95 % | HEART RATE: 78 BPM | DIASTOLIC BLOOD PRESSURE: 70 MMHG | SYSTOLIC BLOOD PRESSURE: 118 MMHG | BODY MASS INDEX: 34.59 KG/M2 | TEMPERATURE: 97.9 F | WEIGHT: 195.2 LBS | HEIGHT: 63 IN | RESPIRATION RATE: 16 BRPM

## 2023-10-13 DIAGNOSIS — Z23 NEED FOR SHINGLES VACCINE: ICD-10-CM

## 2023-10-13 DIAGNOSIS — H54.7 POOR VISION: ICD-10-CM

## 2023-10-13 DIAGNOSIS — Z00.00 ANNUAL PHYSICAL EXAM: Primary | ICD-10-CM

## 2023-10-13 DIAGNOSIS — Z12.11 SCREENING FOR COLON CANCER: ICD-10-CM

## 2023-10-13 DIAGNOSIS — R22.42 LUMP OF SKIN OF LEFT LOWER EXTREMITY: ICD-10-CM

## 2023-10-13 PROCEDURE — 99396 PREV VISIT EST AGE 40-64: CPT | Performed by: FAMILY MEDICINE

## 2023-10-13 NOTE — PROGRESS NOTES
[address]    NAME: Erna Potter  AGE: 46 y.o. SEX: female  : 1970     DATE: 10/13/2023     Assessment and Plan:     Problem List Items Addressed This Visit    None  Visit Diagnoses     Annual physical exam    -  Primary    Screening for colon cancer        Relevant Orders    Ambulatory referral to Gastroenterology    Need for shingles vaccine        Poor vision        Relevant Orders    Ambulatory Referral to Ophthalmology    Lump of skin of left lower extremity        Relevant Orders    US extremity soft tissue          Immunizations and preventive care screenings were discussed with patient today. Appropriate education was printed on patient's after visit summary. Counseling:  Dental Health: discussed importance of regular tooth brushing, flossing, and dental visits. Exercise: the importance of regular exercise/physical activity was discussed. Recommend exercise 3-5 times per week for at least 30 minutes. Shingles vaccine recommended - she is going to think about it   Declined tetanus shot          Return in about 1 year (around 10/13/2024) for Annual physical.     Chief Complaint:     Chief Complaint   Patient presents with   • Physical Exam     Chritsie/SUKUMAR      History of Present Illness:     Adult Annual Physical   Patient here for a comprehensive physical exam. The patient reports  that she has chronic shoulder pain and is going to see her orthopedist .    She has a lump in her left upper thigh. She has been having issues since her tummy tuck        Diet and Physical Activity  Diet/Nutrition: well balanced diet. Exercise: no formal exercise.       Depression Screening  PHQ-2/9 Depression Screening    Little interest or pleasure in doing things: 0 - not at all  Feeling down, depressed, or hopeless: 0 - not at all  Trouble falling or staying asleep, or sleeping too much: 1 - several days  Feeling tired or having little energy: 1 - several days  Poor appetite or overeatin - not at all  Feeling bad about yourself - or that you are a failure or have let yourself or your family down: 0 - not at all  Trouble concentrating on things, such as reading the newspaper or watching television: 0 - not at all  Moving or speaking so slowly that other people could have noticed. Or the opposite - being so fidgety or restless that you have been moving around a lot more than usual: 0 - not at all  Thoughts that you would be better off dead, or of hurting yourself in some way: 0 - not at all  PHQ-9 Score: 2   PHQ-9 Interpretation: No or Minimal depression        General Health  Sleep: has a hard time sleeping   Hearing: normal - bilateral.  Vision:  can tell her vision is getting worse . Dental: regular dental visits. /GYN Health  menopause    Advanced Care Planning  Do you have an advanced directive? no  Do you have a durable medical power of ? No  Form given      Review of Systems:     Review of Systems   Constitutional: Negative. Respiratory: Negative. Cardiovascular: Negative.        Past Medical History:     Past Medical History:   Diagnosis Date   • Allergic rhinitis    • Anxiety    • Disease of thyroid gland    • Endometriosis    • Fibroid    • Hypertension    • Kidney stones     left side   • Migraine     teenager      Past Surgical History:     Past Surgical History:   Procedure Laterality Date   • APPENDECTOMY     • CHOLECYSTECTOMY     • HERNIA REPAIR      umbilical x 2   • KNEE CARTILAGE SURGERY Right     and ACL   • LAPAROSCOPIC GASTRIC BANDING      removed   • OVARIAN CYST SURGERY     • SHOULDER SURGERY Right     x 3      Social History:     Social History     Socioeconomic History   • Marital status: Single     Spouse name: None   • Number of children: None   • Years of education: None   • Highest education level: None   Occupational History   • None   Tobacco Use   • Smoking status: Former     Packs/day: 0.25     Years: 44.00     Total pack years: 11.00     Types: Cigarettes     Start date: 6/15/1984     Quit date: 4/15/2023     Years since quittin.4     Passive exposure: Past   • Smokeless tobacco: Never   Vaping Use   • Vaping Use: Never used   Substance and Sexual Activity   • Alcohol use: Never   • Drug use: Yes     Types: Marijuana     Comment: gummy   • Sexual activity: Yes     Partners: Male     Birth control/protection: Post-menopausal   Other Topics Concern   • None   Social History Narrative   • None     Social Determinants of Health     Financial Resource Strain: Not on file   Food Insecurity: Not on file   Transportation Needs: Not on file   Physical Activity: Not on file   Stress: Not on file   Social Connections: Not on file   Intimate Partner Violence: Not on file   Housing Stability: Not on file      Family History:     Family History   Problem Relation Age of Onset   • Stroke Mother    • Diabetes Father    • Coronary artery disease Father    • Pancreatic cancer Father    • Breast cancer Maternal Grandmother 80   • Breast cancer Maternal Aunt 62   • Breast cancer Paternal Aunt         unkown age      Current Medications:     Current Outpatient Medications   Medication Sig Dispense Refill   • ALPRAZolam (XANAX) 1 mg tablet Take by mouth 2 (two) times a day as needed for anxiety     • amLODIPine (NORVASC) 10 mg tablet Take 10 mg by mouth daily     • halobetasol (ULTRAVATE) 0.05 % ointment APPLY TO AFFECTED AREA TWICE A DAY 50 g 1   • metoprolol succinate (TOPROL-XL) 50 mg 24 hr tablet Take 50 mg by mouth daily       No current facility-administered medications for this visit. Allergies:      Allergies   Allergen Reactions   • Adhesive [Medical Tape] Rash     welts and burns   • Betadine [Povidone Iodine] Rash     Burn and swelling      Physical Exam:     /70   Pulse 78   Temp 97.9 °F (36.6 °C) (Temporal)   Resp 16   Ht 5' 3" (1.6 m)   Wt 88.5 kg (195 lb 3.2 oz)   SpO2 95%   BMI 34.58 kg/m²     Physical Exam  Vitals and nursing note reviewed. Constitutional:       Appearance: She is well-developed. HENT:      Head: Normocephalic and atraumatic. Right Ear: External ear normal.      Left Ear: External ear normal.      Nose: Nose normal.   Cardiovascular:      Rate and Rhythm: Normal rate and regular rhythm. Heart sounds: Normal heart sounds. No murmur heard. No friction rub. Pulmonary:      Effort: No respiratory distress. Breath sounds: Normal breath sounds. No wheezing or rales. Abdominal:      Palpations: Abdomen is soft. Musculoskeletal:         General: Tenderness (lump posterior left proximal thigh) present. Right lower leg: No edema. Left lower leg: No edema. Neurological:      Mental Status: She is oriented to person, place, and time. Cranial Nerves: No cranial nerve deficit.         Vision Screening    Right eye Left eye Both eyes   Without correction 20/25 20/25 20/20   With correction          [facility]ivoRh[facility], [place]

## 2023-10-13 NOTE — PATIENT INSTRUCTIONS
Wellness Visit for Adults   AMBULATORY CARE:   A wellness visit  is when you see your healthcare provider to get screened for health problems. Your healthcare provider will also give you advice on how to stay healthy. Write down your questions so you remember to ask them. Ask your healthcare provider how often you should have a wellness visit. What happens at a wellness visit:  Your healthcare provider will ask about your health, and your family history of health problems. This includes high blood pressure, heart disease, and cancer. He or she will ask if you have symptoms that concern you, if you smoke, and about your mood. You may also be asked about your intake of medicines, supplements, food, and alcohol. Any of the following may be done: Your weight  will be checked. Your height may also be checked so your body mass index (BMI) can be calculated. Your BMI shows if you are at a healthy weight. Your blood pressure  and heart rate will be checked. Your temperature may also be checked. Blood and urine tests  may be done. Blood tests may be done to check your cholesterol levels. Abnormal cholesterol levels increase your risk for heart disease and stroke. You may also need a blood or urine test to check for diabetes if you are at increased risk. Urine tests may be done to look for signs of an infection or kidney disease. A physical exam  includes checking your heartbeat and lungs with a stethoscope. Your healthcare provider may also check your skin to look for sun damage. Screening tests  may be recommended. A screening test is done to check for diseases that may not cause symptoms. The screening tests you may need depend on your age, gender, family history, and lifestyle habits. For example, colorectal screening may be recommended if you are 48years old or older. Screening tests you need if you are a woman:   A Pap smear  is used to screen for cervical cancer.  Pap smears are usually done every 3 to 5 years depending on your age. You may need them more often if you have had abnormal Pap smear test results in the past. Ask your healthcare provider how often you should have a Pap smear. A mammogram  is an x-ray of your breasts to screen for breast cancer. Experts recommend mammograms every 2 years starting at age 48 years. You may need a mammogram at age 52 years or younger if you have an increased risk for breast cancer. Talk to your healthcare provider about when you should start having mammograms and how often you need them. Vaccines you may need:   Get an influenza vaccine  every year. The influenza vaccine protects you from the flu. Several types of viruses cause the flu. The viruses change over time, so new vaccines are made each year. Get a tetanus-diphtheria (Td) booster vaccine  every 10 years. This vaccine protects you against tetanus and diphtheria. Tetanus is a severe infection that may cause painful muscle spasms and lockjaw. Diphtheria is a severe bacterial infection that causes a thick covering in the back of your mouth and throat. Get a human papillomavirus (HPV) vaccine  if you are female and aged 23 to 32 or male 23 to 24 and never received it. This vaccine protects you from HPV infection. HPV is the most common infection spread by sexual contact. HPV may also cause vaginal, penile, and anal cancers. Get a pneumococcal vaccine  if you are aged 72 years or older. The pneumococcal vaccine is an injection given to protect you from pneumococcal disease. Pneumococcal disease is an infection caused by pneumococcal bacteria. The infection may cause pneumonia, meningitis, or an ear infection. Get a shingles vaccine  if you are 60 or older, even if you have had shingles before. The shingles vaccine is an injection to protect you from the varicella-zoster virus. This is the same virus that causes chickenpox.  Shingles is a painful rash that develops in people who had chickenpox or have been exposed to the virus. How to eat healthy:  My Plate is a model for planning healthy meals. It shows the types and amounts of foods that should go on your plate. Fruits and vegetables make up about half of your plate, and grains and protein make up the other half. A serving of dairy is included on the side of your plate. The amount of calories and serving sizes you need depends on your age, gender, weight, and height. Examples of healthy foods are listed below:  Eat a variety of vegetables  such as dark green, red, and orange vegetables. You can also include canned vegetables low in sodium (salt) and frozen vegetables without added butter or sauces. Eat a variety of fresh fruits , canned fruit in 100% juice, frozen fruit, and dried fruit. Include whole grains. At least half of the grains you eat should be whole grains. Examples include whole-wheat bread, wheat pasta, brown rice, and whole-grain cereals such as oatmeal.    Eat a variety of protein foods such as seafood (fish and shellfish), lean meat, and poultry without skin (turkey and chicken). Examples of lean meats include pork leg, shoulder, or tenderloin, and beef round, sirloin, tenderloin, and extra lean ground beef. Other protein foods include eggs and egg substitutes, beans, peas, soy products, nuts, and seeds. Choose low-fat dairy products such as skim or 1% milk or low-fat yogurt, cheese, and cottage cheese. Limit unhealthy fats  such as butter, hard margarine, and shortening. Exercise:  Exercise at least 30 minutes per day on most days of the week. Some examples of exercise include walking, biking, dancing, and swimming. You can also fit in more physical activity by taking the stairs instead of the elevator or parking farther away from stores. Include muscle strengthening activities 2 days each week. Regular exercise provides many health benefits.  It helps you manage your weight, and decreases your risk for type 2 diabetes, heart disease, stroke, and high blood pressure. Exercise can also help improve your mood. Ask your healthcare provider about the best exercise plan for you. General health and safety guidelines:   Do not smoke. Nicotine and other chemicals in cigarettes and cigars can cause lung damage. Ask your healthcare provider for information if you currently smoke and need help to quit. E-cigarettes or smokeless tobacco still contain nicotine. Talk to your healthcare provider before you use these products. Limit alcohol. A drink of alcohol is 12 ounces of beer, 5 ounces of wine, or 1½ ounces of liquor. Lose weight, if needed. Being overweight increases your risk of certain health conditions. These include heart disease, high blood pressure, type 2 diabetes, and certain types of cancer. Protect your skin. Do not sunbathe or use tanning beds. Use sunscreen with a SPF 15 or higher. Apply sunscreen at least 15 minutes before you go outside. Reapply sunscreen every 2 hours. Wear protective clothing, hats, and sunglasses when you are outside. Drive safely. Always wear your seatbelt. Make sure everyone in your car wears a seatbelt. A seatbelt can save your life if you are in an accident. Do not use your cell phone when you are driving. This could distract you and cause an accident. Pull over if you need to make a call or send a text message. Practice safe sex. Use latex condoms if are sexually active and have more than one partner. Your healthcare provider may recommend screening tests for sexually transmitted infections (STIs). Wear helmets, lifejackets, and protective gear. Always wear a helmet when you ride a bike or motorcycle, go skiing, or play sports that could cause a head injury. Wear protective equipment when you play sports. Wear a lifejacket when you are on a boat or doing water sports.     © Copyright [employer] 2023 Information is for End User's use only and may not be sold, redistributed or otherwise used for commercial purposes. The above information is an  only. It is not intended as medical advice for individual conditions or treatments. Talk to your doctor, nurse or pharmacist before following any medical regimen to see if it is safe and effective for you.

## 2023-10-20 ENCOUNTER — HOSPITAL ENCOUNTER (OUTPATIENT)
Dept: RADIOLOGY | Facility: HOSPITAL | Age: 53
Discharge: HOME | End: 2023-10-20
Payer: COMMERCIAL

## 2023-10-20 DIAGNOSIS — R22.42 LUMP OF SKIN OF LEFT LOWER EXTREMITY: ICD-10-CM

## 2023-10-20 PROCEDURE — 76882 US LMTD JT/FCL EVL NVASC XTR: CPT

## 2023-10-29 RX ORDER — METHYLPREDNISOLONE 4 MG/1
TABLET ORAL
COMMUNITY
Start: 2023-10-18 | End: 2023-10-30

## 2023-10-29 RX ORDER — AMOXICILLIN 875 MG/1
TABLET, COATED ORAL
COMMUNITY
Start: 2023-10-18

## 2023-10-30 ENCOUNTER — OFFICE VISIT (OUTPATIENT)
Dept: FAMILY MEDICINE CLINIC | Facility: CLINIC | Age: 53
End: 2023-10-30
Payer: COMMERCIAL

## 2023-10-30 VITALS
BODY MASS INDEX: 34.84 KG/M2 | RESPIRATION RATE: 16 BRPM | TEMPERATURE: 97.8 F | HEIGHT: 63 IN | SYSTOLIC BLOOD PRESSURE: 132 MMHG | DIASTOLIC BLOOD PRESSURE: 84 MMHG | HEART RATE: 84 BPM | WEIGHT: 196.6 LBS

## 2023-10-30 DIAGNOSIS — I10 ESSENTIAL HYPERTENSION: ICD-10-CM

## 2023-10-30 DIAGNOSIS — M24.811 INTERNAL DERANGEMENT OF RIGHT SHOULDER: ICD-10-CM

## 2023-10-30 DIAGNOSIS — Z01.818 PREOPERATIVE CLEARANCE: Primary | ICD-10-CM

## 2023-10-30 DIAGNOSIS — F41.9 ANXIETY: ICD-10-CM

## 2023-10-30 PROCEDURE — 93000 ELECTROCARDIOGRAM COMPLETE: CPT | Performed by: FAMILY MEDICINE

## 2023-10-30 PROCEDURE — 3075F SYST BP GE 130 - 139MM HG: CPT | Performed by: FAMILY MEDICINE

## 2023-10-30 PROCEDURE — 3079F DIAST BP 80-89 MM HG: CPT | Performed by: FAMILY MEDICINE

## 2023-10-30 PROCEDURE — 99213 OFFICE O/P EST LOW 20 MIN: CPT | Performed by: FAMILY MEDICINE

## 2023-10-30 NOTE — PROGRESS NOTES
Assessment/Plan:    No problem-specific Assessment & Plan notes found for this encounter. Bmp and cbc wnl  EKG wnl    Form completed for fax    [facility] for surgery  ASA class 2  Htn stable  Anxiety unchanged on xanax 1mg bid prn     Diagnoses and all orders for this visit:    Preoperative clearance  -     POCT ECG    Internal derangement of right shoulder    Essential hypertension    Anxiety    Other orders  -     Discontinue: methylPREDNISolone 4 MG tablet therapy pack;  (Patient not taking: Reported on 10/30/2023)  -     amoxicillin (AMOXIL) 875 mg tablet        No follow-ups on file. Subjective:      Patient ID: [place] is a 46 y.o. female. Chief Complaint   Patient presents with    Pre-op Exam     Right shoulder surgery Mitchel Ortega LPN         HPI  Planned procedure:  right shoulder arthroscopy for derangement  Surgeon:  Dr Adriane [facility]e [facility]opedics  Date:  11/30/23  Anesthesia type:  regional and sedation    Prior major surgeries:  3 prior surgeries on right shoulder, right knee arthroscopy and ACL repair, appendectomy, cholecystectomy, ventral hernia repair, abdominoplasty, lap band and gastric sleeve  Problems with anesthesia in past:  none    Can walk 4 blocks or 2 flights of stairs:  y  History of excessive bleeding:  n  History of excessive clotting:  n  Personal history of DVT or Pe:  n    Taking NSAIDS:  none  Takings vitamins D or E or A or fish oil supplements:  none    Usual am medications:  amlodipine 10mg and metoprolol 50mg and can take as usual am of surgery with sips of water    Cbc wnl  Bmp wnl  EKG wnl in office today    The following portions of the patient's history were reviewed and updated as appropriate: allergies, current medications, past family history, past medical history, past social history, past surgical history and problem list.    Review of Systems   Constitutional:  Negative for fever. Respiratory:  Negative for shortness of breath.       No recent MI, CHF or recent illness    Current Outpatient Medications   Medication Sig Dispense Refill    ALPRAZolam (XANAX) 1 mg tablet Take by mouth 2 (two) times a day as needed for anxiety      amLODIPine (NORVASC) 10 mg tablet Take 10 mg by mouth daily      amoxicillin (AMOXIL) 875 mg tablet       halobetasol (ULTRAVATE) 0.05 % ointment APPLY TO AFFECTED AREA TWICE A DAY 50 g 1    metoprolol succinate (TOPROL-XL) 50 mg 24 hr tablet Take 50 mg by mouth daily       No current facility-administered medications for this visit. Objective:    /84   Pulse 84   Temp 97.8 °F (36.6 °C)   Resp 16   Ht 5' 3" (1.6 m)   Wt 89.2 kg (196 lb 9.6 oz)   BMI 34.83 kg/m²        Physical Exam  Vitals and nursing note reviewed. Constitutional:       General: She is not in acute distress. Appearance: She is well-developed. She is not ill-appearing. HENT:      Head: Normocephalic. Right Ear: Tympanic membrane normal.      Left Ear: Tympanic membrane normal.   Eyes:      General: No scleral icterus. Conjunctiva/sclera: Conjunctivae normal.   Cardiovascular:      Rate and Rhythm: Normal rate and regular rhythm. Pulmonary:      Effort: Pulmonary effort is normal. No respiratory distress. Breath sounds: No wheezing. Abdominal:      General: There is no distension. Palpations: Abdomen is soft. Musculoskeletal:         General: No deformity. Cervical back: Neck supple. Comments: Right shoulder pain with rom   Skin:     General: Skin is warm and dry. Coloration: Skin is not pale. Neurological:      Mental Status: She is alert. Motor: No weakness. Gait: Gait normal.   Psychiatric:         Behavior: Behavior normal.         Thought Content:  Thought content normal.           Henri Shay DO

## 2023-11-02 ENCOUNTER — CONSULT (OUTPATIENT)
Dept: GASTROENTEROLOGY | Facility: CLINIC | Age: 53
End: 2023-11-02
Payer: COMMERCIAL

## 2023-11-02 VITALS
SYSTOLIC BLOOD PRESSURE: 153 MMHG | WEIGHT: 197 LBS | BODY MASS INDEX: 34.91 KG/M2 | HEIGHT: 63 IN | OXYGEN SATURATION: 97 % | DIASTOLIC BLOOD PRESSURE: 95 MMHG | HEART RATE: 91 BPM

## 2023-11-02 DIAGNOSIS — Z90.3 H/O GASTRIC SLEEVE: Primary | ICD-10-CM

## 2023-11-02 DIAGNOSIS — Z12.11 SCREENING FOR COLON CANCER: ICD-10-CM

## 2023-11-02 PROCEDURE — 99204 OFFICE O/P NEW MOD 45 MIN: CPT | Performed by: PHYSICIAN ASSISTANT

## 2023-11-02 NOTE — PATIENT INSTRUCTIONS
Scheduled date of EGD/colonoscopy (as of today): 1/23/24  Physician performing EGD/colonoscopy: Lonnie Guerrero  Location of EGD/colonoscopy: [address]  Desired bowel prep reviewed with patient: Miralax/Dulcolax  Instructions reviewed with patient by: Sherry QUEEN  Clearances:  n/a

## 2023-11-02 NOTE — PROGRESS NOTES
[facility] - Outpatient Consultation  Mikie Leo 46 y.o. female MRN: [id number]  Encounter: [id number]          ASSESSMENT AND PLAN:      19-year-old female with history of gastric sleeve, previous lap band removed 2014, cholecystectomy, abdominoplasty who presents the office as a new patient for colon cancer screening. 1. ? History of colon polyps  Reports a prior colonoscopy likely over 10 years ago with a polyp removed however unsure when a repeat was recommended. No change in bowel habits. Recent hemoglobin normal.  No family history of colon cancer.    -Schedule colonoscopy  -Patient reports she would not be able to tolerate large volume in the setting of gastric sleeve as well as poor taste preps. Will proceed with miralax/dulcolax  -Discussed the risks of procedure including bleeding, infection per    2. History of gastric sleeve  Performed 11/2015. No EGD performed after gastric sleeve. .  She is not on any reflux medication. Very rare symptoms related to food intake. -Recommend EGD as patient is over 3 years out from gastric sleeve to rule out Clayton's esophagus. Patient was recommended to follow up after procedures. ______________________________________________________________________    HPI:      Mikie Leo is a 46 y.o. female with history of gastric sleeve, previous lap band x 2 removed 2014, cholecystectomy, hypertension, MDD who presents to the office as a new patient for colon cancer screening. Patient reports doing well. She denies any change in bowel habits, blood in stool, abdominal pain. Reports her weight has increased after previously losing weight. She denies any chronic reflux symptoms. She reports her symptoms often associated with spicy food. Most recent lab work around 4 months ago with normal CBC, CMP unremarkable.   Last abdominal imaging was around 5 months ago after abdominoplasty which was complicated by nonhealing wound.    Abdominal surgeries consist of her gastric sleeve, previous lap band x2 with removal in 2014, cholecystectomy, abdominoplasty. Patient's father unfortunately passed away from pancreatic cancer diagnosed at the age of 68. Patient quit smoking within the last year. No significant alcohol use. She does report having a colonoscopy at least 10 years ago and believes a polyp was removed however does not know when she was recommended a repeat. She reports having an EGD which was prior to her Lap-Band, none since gastric sleeve. REVIEW OF SYSTEMS:    CONSTITUTIONAL: Denies any fever, chills, rigors, and weight loss. HEENT: No earache or tinnitus. Denies hearing loss or visual disturbances. CARDIOVASCULAR: No chest pain or palpitations. RESPIRATORY: Denies any cough, hemoptysis, shortness of breath or dyspnea on exertion. GASTROINTESTINAL: As noted in the History of Present Illness. GENITOURINARY: No problems with urination. Denies any hematuria or dysuria. NEUROLOGIC: No dizziness or vertigo, denies headaches. MUSCULOSKELETAL: Denies any muscle or joint pain. SKIN: Denies skin rashes or itching. ENDOCRINE: Denies excessive thirst. Denies intolerance to heat or cold. PSYCHOSOCIAL: Denies depression or anxiety. Denies any recent memory loss.        Historical Information   Past Medical History:   Diagnosis Date    Allergic rhinitis     Anxiety     Disease of thyroid gland     Endometriosis     Fibroid     Hypertension     Kidney stones     left side    Migraine     teenager     Past Surgical History:   Procedure Laterality Date    APPENDECTOMY      CHOLECYSTECTOMY      HERNIA REPAIR      umbilical x 3 last one 19/0345    KNEE CARTILAGE SURGERY Right     and ACL    LAPAROSCOPIC GASTRIC BANDING      removed    OVARIAN CYST SURGERY      SHOULDER SURGERY Right     x 3    STOMACH SURGERY  05/2023    tummy tuck     Social History   Social History     Substance and Sexual Activity   Alcohol Use Never     Social History     Substance and Sexual Activity   Drug Use Yes    Types: Marijuana    Comment: gummy     Social History     Tobacco Use   Smoking Status Former    Packs/day: 0.25    Years: 44.00    Total pack years: 11.00    Types: Cigarettes    Start date: 6/15/1984    Quit date: 4/15/2023    Years since quittin.5    Passive exposure: Past   Smokeless Tobacco Never     Family History   Problem Relation Age of Onset    Stroke Mother     Diabetes Father     Coronary artery disease Father     Pancreatic cancer Father     Breast cancer Maternal Grandmother 80    Breast cancer Maternal Aunt 62    Breast cancer Paternal Aunt         unkown age       Meds/Allergies       Current Outpatient Medications:     ALPRAZolam (XANAX) 1 mg tablet    amLODIPine (NORVASC) 10 mg tablet    halobetasol (ULTRAVATE) 0.05 % ointment    metoprolol succinate (TOPROL-XL) 50 mg 24 hr tablet    amoxicillin (AMOXIL) 875 mg tablet    Allergies   Allergen Reactions    Adhesive [Medical Tape] Rash     welts and burns    Betadine [Povidone Iodine] Rash     Burn and swelling           Objective     Blood pressure 153/95, pulse 91, height 5' 3" (1.6 m), weight 89.4 kg (197 lb), SpO2 97 %. Body mass index is 34.9 kg/m². PHYSICAL EXAM:      General Appearance:   Alert, cooperative, no distress   HEENT:   Normocephalic, atraumatic, anicteric. Neck:  Supple, symmetrical, trachea midline   Lungs:   Clear to auscultation bilaterally; no rales, rhonchi or wheezing; respirations unlabored    Heart[de-identified]   Regular rate and rhythm; no murmur, rub, or gallop. Abdomen:   Prior surgical scars noted, clean and dry. 1 incision is covered currently with bandage. Abdomen is soft and flat.   Bowel sounds present   Genitalia:   Deferred    Rectal:   Deferred    Extremities:  No cyanosis, clubbing or edema    Pulses:  2+ and symmetric    Skin:  No jaundice, rashes, or lesions    Lymph nodes:  No palpable cervical lymphadenopathy        Lab Results:   No visits with results within 1 Day(s) from this visit. Latest known visit with results is:   Admission on 05/28/2023, Discharged on 05/29/2023   Component Date Value    WBC 05/28/2023 11.17 (H)     RBC 05/28/2023 4.12     Hemoglobin 05/28/2023 11.4 (L)     Hematocrit 05/28/2023 36.1     MCV 05/28/2023 88     MCH 05/28/2023 27.7     MCHC 05/28/2023 31.6     RDW 05/28/2023 13.3     MPV 05/28/2023 10.1     Platelets 69/19/7531 579 (H)     nRBC 05/28/2023 0     Neutrophils Relative 05/28/2023 56     Immat GRANS % 05/28/2023 0     Lymphocytes Relative 05/28/2023 26     Monocytes Relative 05/28/2023 7     Eosinophils Relative 05/28/2023 9 (H)     Basophils Relative 05/28/2023 2 (H)     Neutrophils Absolute 05/28/2023 6.23     Immature Grans Absolute 05/28/2023 0.05     Lymphocytes Absolute 05/28/2023 2.87     Monocytes Absolute 05/28/2023 0.83     Eosinophils Absolute 05/28/2023 1.01 (H)     Basophils Absolute 05/28/2023 0.18 (H)     Sodium 05/28/2023 137     Potassium 05/28/2023 3.7     Chloride 05/28/2023 103     CO2 05/28/2023 26     ANION GAP 05/28/2023 8     BUN 05/28/2023 23     Creatinine 05/28/2023 1.05     Glucose 05/28/2023 101     Calcium 05/28/2023 8.9     AST 05/28/2023 15     ALT 05/28/2023 11     Alkaline Phosphatase 05/28/2023 60     Total Protein 05/28/2023 7.5     Albumin 05/28/2023 3.8     Total Bilirubin 05/28/2023 0.30     eGFR 05/28/2023 60     Extra Tube 05/28/2023 Hold for add-ons. LACTIC ACID 05/28/2023 0.8          Radiology Results:   US extremity soft tissue    Result Date: 10/29/2023  Narrative: EXTREMITY SOFT TISSUE ULTRASOUND INDICATION:   R22.42: Localized swelling, mass and lump, left lower limb. Left posterior proximal thigh palpable abnormality COMPARISON:  None TECHNIQUE:   Real-time ultrasound of the area of concern was performed with a linear transducer with both volumetric sweeps and still imaging techniques.  FINDINGS: Corresponding to the area of palpable abnormality is a mass nearly isoechoic to adjacent normal soft tissue, measuring 3.1 x 1.5 x 4.1 cm. It is avascular and nonshadowing. There are no cystic fluid collections. There are no other ultrasonographic abnormalities. Impression: Although nonspecific, the imaging characteristics are most suggestive of a benign lipoma measuring 3.1 x 4.1 x 1.5 cm in size.  Periodic physical examination reassessment recommended to assure there is no interval change in size or character that would warrant further evaluation Workstation performed: [id number]

## 2023-12-26 ENCOUNTER — OFFICE VISIT (OUTPATIENT)
Dept: FAMILY MEDICINE CLINIC | Facility: CLINIC | Age: 53
End: 2023-12-26
Payer: COMMERCIAL

## 2023-12-26 VITALS
RESPIRATION RATE: 18 BRPM | HEIGHT: 63 IN | WEIGHT: 186 LBS | HEART RATE: 84 BPM | TEMPERATURE: 97.8 F | OXYGEN SATURATION: 98 % | DIASTOLIC BLOOD PRESSURE: 80 MMHG | SYSTOLIC BLOOD PRESSURE: 132 MMHG | BODY MASS INDEX: 32.96 KG/M2

## 2023-12-26 DIAGNOSIS — J01.00 ACUTE MAXILLARY SINUSITIS, RECURRENCE NOT SPECIFIED: ICD-10-CM

## 2023-12-26 DIAGNOSIS — M79.605 ACUTE LEG PAIN, LEFT: ICD-10-CM

## 2023-12-26 DIAGNOSIS — D17.24 LIPOMA OF LEFT LOWER EXTREMITY: Primary | ICD-10-CM

## 2023-12-26 DIAGNOSIS — L40.9 PSORIASIS: ICD-10-CM

## 2023-12-26 DIAGNOSIS — R22.9 LUMP OF SKIN: ICD-10-CM

## 2023-12-26 DIAGNOSIS — E66.09 CLASS 1 OBESITY DUE TO EXCESS CALORIES WITH SERIOUS COMORBIDITY AND BODY MASS INDEX (BMI) OF 34.0 TO 34.9 IN ADULT: ICD-10-CM

## 2023-12-26 PROBLEM — Z01.818 PREOPERATIVE CLEARANCE: Status: RESOLVED | Noted: 2023-10-30 | Resolved: 2023-12-26

## 2023-12-26 PROCEDURE — 99214 OFFICE O/P EST MOD 30 MIN: CPT | Performed by: FAMILY MEDICINE

## 2023-12-26 RX ORDER — HALOBETASOL PROPIONATE 0.05 %
OINTMENT (GRAM) TOPICAL 2 TIMES DAILY
Qty: 50 G | Refills: 1 | Status: SHIPPED | OUTPATIENT
Start: 2023-12-26

## 2023-12-26 RX ORDER — AZITHROMYCIN 250 MG/1
TABLET, FILM COATED ORAL
Qty: 6 TABLET | Refills: 0 | Status: SHIPPED | OUTPATIENT
Start: 2023-12-26 | End: 2023-12-31

## 2023-12-26 NOTE — ASSESSMENT & PLAN NOTE
Not controlled  We discussed that since she is having problems with her stomach now would not be a good time to start weight loss medication.  Long-term though she would be a good candidate for a medication like Saxenda or Wegovy

## 2023-12-26 NOTE — PROGRESS NOTES
Name: Marci Lopez      : 1970      MRN: [id number]  Encounter Provider: Farhana Tompkins DO  Encounter Date: 2023   Encounter department: [facility]    Assessment & Plan     1. Lipoma of left lower extremity  Comments:  Having worsening pain at the site reviewed ultrasound previously done    2. Psoriasis  Assessment & Plan:  Flaring on her feet  Ointment prescribed    Orders:  -     halobetasol (ULTRAVATE) 0.05 % ointment; Apply topically 2 (two) times a day To affected area    3. Lump of skin  Comments:  New on right foot  Orders:  -     Ambulatory referral to Podiatry; Future    4. Acute leg pain, left  Comments:  New onset of left leg pain  Orders:  -     VAS lower limb venous duplex study, unilateral/limited; Future; Expected date: 2023    5. Class 1 obesity due to excess calories with serious comorbidity and body mass index (BMI) of 34.0 to 34.9 in adult  Assessment & Plan:  Not controlled  We discussed that since she is having problems with her stomach now would not be a good time to start weight loss medication.  Long-term though she would be a good candidate for a medication like Saxenda or Wegovy      6. Acute maxillary sinusitis, recurrence not specified  -     azithromycin (ZITHROMAX) 250 mg tablet; Take 2 the first day, then take 1 daily       Return if symptoms worsen or fail to improve.  Subjective      She can feel the lipoma in her left thigh is getting bigger. It is causing her leg pain.    Her whole left leg is very painful and it is worse.  She is worried about a clot. Heat aggravated.    She has a new lump on her right foot.  She had the cyst drained there 20 years ago and now it is back.     She has been having issues with vomiting for the past 2 weeks. She thinks she has had a lot of stress. She has an EGD and colonoscopy scheduled next month.       Review of Systems   Gastrointestinal:  Positive for vomiting. Negative for diarrhea.       Current Outpatient  "Medications on File Prior to Visit   Medication Sig   • ALPRAZolam (XANAX) 1 mg tablet Take by mouth 2 (two) times a day as needed for anxiety   • amLODIPine (NORVASC) 10 mg tablet Take 10 mg by mouth daily   • metoprolol succinate (TOPROL-XL) 50 mg 24 hr tablet Take 50 mg by mouth daily   • [DISCONTINUED] halobetasol (ULTRAVATE) 0.05 % ointment APPLY TO AFFECTED AREA TWICE A DAY   • [DISCONTINUED] amoxicillin (AMOXIL) 875 mg tablet  (Patient not taking: Reported on 11/2/2023)       Objective     /80   Pulse 84   Temp 97.8 °F (36.6 °C)   Resp 18   Ht 5' 3\" (1.6 m)   Wt 84.4 kg (186 lb)   SpO2 98%   BMI 32.95 kg/m²     Physical Exam  Vitals and nursing note reviewed.   Constitutional:       Appearance: She is well-developed.   HENT:      Head: Normocephalic and atraumatic.      Right Ear: Tympanic membrane and external ear normal.      Left Ear: Tympanic membrane and external ear normal.   Cardiovascular:      Rate and Rhythm: Normal rate and regular rhythm.      Heart sounds: Normal heart sounds. No murmur heard.     No friction rub.   Pulmonary:      Effort: Pulmonary effort is normal. No respiratory distress.      Breath sounds: Normal breath sounds. No wheezing or rales.   Musculoskeletal:         General: Tenderness (Throughout left leg, multiple areas of concern) present.      Right lower leg: No edema.      Left lower leg: No edema.   Skin:     Comments: Mobile cyst right lateral foot       Farhana Tompkins, DO    "

## 2023-12-27 ENCOUNTER — HOSPITAL ENCOUNTER (OUTPATIENT)
Dept: VASCULAR ULTRASOUND | Facility: HOSPITAL | Age: 53
Discharge: HOME | End: 2023-12-27
Payer: COMMERCIAL

## 2023-12-27 DIAGNOSIS — M79.605 ACUTE LEG PAIN, LEFT: ICD-10-CM

## 2023-12-27 PROCEDURE — 93971 EXTREMITY STUDY: CPT

## 2023-12-28 PROCEDURE — 93971 EXTREMITY STUDY: CPT | Performed by: SURGERY

## 2023-12-29 ENCOUNTER — TELEPHONE (OUTPATIENT)
Dept: FAMILY MEDICINE CLINIC | Facility: CLINIC | Age: 53
End: 2023-12-29

## 2023-12-29 NOTE — TELEPHONE ENCOUNTER
12/29/2023 9:28 AM spoke with patient and discussed vascular results.   She is going to see her cardiologist next week who has a vascular specialist in his office.   She has a copy of there report to bring with her to her appointment  Farhana Tompkins, DO

## 2024-01-09 ENCOUNTER — ANESTHESIA EVENT (OUTPATIENT)
Dept: ANESTHESIOLOGY | Facility: HOSPITAL | Age: 54
End: 2024-01-09

## 2024-01-09 ENCOUNTER — ANESTHESIA (OUTPATIENT)
Dept: ANESTHESIOLOGY | Facility: HOSPITAL | Age: 54
End: 2024-01-09

## 2024-01-11 ENCOUNTER — APPOINTMENT (OUTPATIENT)
Dept: RADIOLOGY | Facility: CLINIC | Age: 54
End: 2024-01-11
Payer: COMMERCIAL

## 2024-01-11 ENCOUNTER — OFFICE VISIT (OUTPATIENT)
Age: 54
End: 2024-01-11
Payer: COMMERCIAL

## 2024-01-11 VITALS
HEART RATE: 87 BPM | SYSTOLIC BLOOD PRESSURE: 142 MMHG | BODY MASS INDEX: 32.96 KG/M2 | WEIGHT: 186 LBS | HEIGHT: 63 IN | DIASTOLIC BLOOD PRESSURE: 94 MMHG

## 2024-01-11 DIAGNOSIS — R22.9 LUMP OF SKIN: ICD-10-CM

## 2024-01-11 DIAGNOSIS — M67.471 GANGLION CYST OF RIGHT FOOT: Primary | ICD-10-CM

## 2024-01-11 PROCEDURE — 99203 OFFICE O/P NEW LOW 30 MIN: CPT | Performed by: STUDENT IN AN ORGANIZED HEALTH CARE EDUCATION/TRAINING PROGRAM

## 2024-01-11 PROCEDURE — 10060 I&D ABSCESS SIMPLE/SINGLE: CPT | Performed by: STUDENT IN AN ORGANIZED HEALTH CARE EDUCATION/TRAINING PROGRAM

## 2024-01-11 PROCEDURE — 73630 X-RAY EXAM OF FOOT: CPT

## 2024-01-11 RX ORDER — LIDOCAINE HYDROCHLORIDE 10 MG/ML
1 INJECTION, SOLUTION EPIDURAL; INFILTRATION; INTRACAUDAL; PERINEURAL ONCE
Status: COMPLETED | OUTPATIENT
Start: 2024-01-11 | End: 2024-01-11

## 2024-01-11 RX ORDER — DIAZEPAM 5 MG/1
TABLET ORAL
COMMUNITY
Start: 2023-12-15

## 2024-01-11 RX ORDER — HYDROMORPHONE HYDROCHLORIDE 2 MG/1
TABLET ORAL
COMMUNITY
Start: 2023-12-13

## 2024-01-11 RX ORDER — DEXAMETHASONE SODIUM PHOSPHATE 4 MG/ML
4 INJECTION, SOLUTION INTRA-ARTICULAR; INTRALESIONAL; INTRAMUSCULAR; INTRAVENOUS; SOFT TISSUE ONCE
Status: COMPLETED | OUTPATIENT
Start: 2024-01-11 | End: 2024-01-11

## 2024-01-11 RX ADMIN — DEXAMETHASONE SODIUM PHOSPHATE 4 MG: 4 INJECTION, SOLUTION INTRA-ARTICULAR; INTRALESIONAL; INTRAMUSCULAR; INTRAVENOUS; SOFT TISSUE at 20:38

## 2024-01-11 RX ADMIN — LIDOCAINE HYDROCHLORIDE 1 ML: 10 INJECTION, SOLUTION EPIDURAL; INFILTRATION; INTRACAUDAL; PERINEURAL at 20:39

## 2024-01-11 NOTE — PROGRESS NOTES
"This patient was seen on 1/11/2024.    My role is Foot , Ankle, and Wound Specialist    ASSESSMENT     Diagnoses and all orders for this visit:    Ganglion cyst of right foot  -     dexamethasone (DECADRON) injection 4 mg  -     lidocaine (PF) (XYLOCAINE-MPF) 1 % injection 1 mL  -     Cancel: Incision and Drainage  -     Incision and Drainage    Lump of skin  Comments:  New on right foot  Orders:  -     Ambulatory referral to Podiatry  -     X-ray foot right 3+ views; Future    Other orders  -     diazepam (VALIUM) 5 mg tablet; TAKE 1 TO 2 TABLETS AT BED TIME (Patient not taking: Reported on 1/11/2024)  -     HYDROmorphone (DILAUDID) 2 mg tablet; TAKE 1 TABLET BY MOUTH EVEFRY 4 TO 6 HOURS AS NEEDED FOR PAIN (Patient not taking: Reported on 1/11/2024)  -     Cancel: Wound negative pressure wound therapy  -     Cancel: Lesion Destruction         Problem List Items Addressed This Visit    None  Visit Diagnoses       Ganglion cyst of right foot    -  Primary    Relevant Medications    dexamethasone (DECADRON) injection 4 mg    lidocaine (PF) (XYLOCAINE-MPF) 1 % injection 1 mL    Other Relevant Orders    Incision and Drainage    Lump of skin        New on right foot    Relevant Orders    X-ray foot right 3+ views (Completed)          PLAN  -Marci and I discussed her right foot  -ganglion cyst drained and injected with dexamethasone as described below. Patient is aware that this is not 100% successful and may return.  -RTC as needed should cyst return.     Incision and Drainage    Date/Time: 1/11/2024 10:30 AM    Performed by: Blake Borges DPM  Authorized by: Blake Borges DPM  Universal Protocol:  Consent: Verbal consent obtained.  Risks and benefits: risks, benefits and alternatives were discussed  Consent given by: patient  Time out: Immediately prior to procedure a \"time out\" was called to verify the correct patient, procedure, equipment, support staff and site/side marked as required.  Patient understanding: " "patient states understanding of the procedure being performed  Required items: required blood products, implants, devices, and special equipment available  Patient identity confirmed: verbally with patient    Patient location:  Clinic  Location:     Type:  Cyst    Location:  Lower extremity    Lower extremity location:  R foot  Pre-procedure details:     Skin preparation:  Alcohol  Anesthesia (see MAR for exact dosages):     Anesthesia method:  Local infiltration    Local anesthetic:  Lidocaine 1% w/o epi  Procedure details:     Needle aspiration: yes      Needle size:  25 G    Approach:  Puncture    Drainage amount:  Moderate (clear, gelatenous material)  Post-procedure details:     Patient tolerance of procedure:  Tolerated well, no immediate complications        SUBJECTIVE    Chief Complaint:  Right foot soft tissue mass with pain     Patient ID: Marci Lopez     1/11/24: Marci is a pleasant 52yo female who presents today with a soft tissue mass to her right foot. She states that her foot had a cyst in the same location about 20-years ago. She had it drained at this time and it stayed completely gone until the past two months when it came back. She states that the lesion is painful at the end of the day when her foot swells. She also states that when she is walking it hurts. She denies attempting any treatment thus far.         The following portions of the patient's history were reviewed and updated as appropriate: allergies, current medications, past family history, past medical history, past social history, past surgical history and problem list.    Review of Systems   Constitutional: Negative.    HENT: Negative.     Respiratory: Negative.     Cardiovascular: Negative.    Gastrointestinal: Negative.    Musculoskeletal: Negative.    Skin:         Soft tissue mass R foot   Neurological: Negative.          OBJECTIVE      /94   Pulse 87   Ht 5' 3\" (1.6 m)   Wt 84.4 kg (186 lb)   BMI 32.95 kg/m²        " Physical Exam  Constitutional:       Appearance: Normal appearance.   HENT:      Head: Normocephalic and atraumatic.   Eyes:      General:         Right eye: No discharge.         Left eye: No discharge.   Cardiovascular:      Rate and Rhythm: Normal rate and regular rhythm.      Pulses:           Dorsalis pedis pulses are 2+ on the right side and 2+ on the left side.        Posterior tibial pulses are 2+ on the right side and 2+ on the left side.   Pulmonary:      Effort: Pulmonary effort is normal.      Breath sounds: Normal breath sounds.   Skin:     General: Skin is warm.      Capillary Refill: Capillary refill takes less than 2 seconds.   Neurological:      Sensory: Sensation is intact. No sensory deficit.         Vascular:  -DP and PT pulses intact b/l  -Capillary refill time <2 sec b/l  -Digital hair growth: Present  -Skin temp: WNL    MSK:  -Pain on palpation right lateral foot near the 4th/5th metatarsal base at the location of a soft-tissue mass  -No gross deformities noted   -MMT is 5/5 to all muscle compartments of the lower extremity  -Ankle dorsiflexion >10 degrees with knee extended and knee flexed b/l    Neuro:  -Light sensation intact bilaterally  -Protective sensation intact bilaterally    Derm:  -No abrasions, or open wounds noted  -No noted interdigital maceration, peeling, malodor  -No callus formation noted on exam  -R foot soft tissue mass noted to the lateral foot near the 4th-5th metatarsal bases. The lesion is firm and not mobile and measures approximately 1.0cm x 1.0cm x 0.5cm

## 2024-01-23 ENCOUNTER — HOSPITAL ENCOUNTER (OUTPATIENT)
Dept: GASTROENTEROLOGY | Facility: AMBULARY SURGERY CENTER | Age: 54
Setting detail: OUTPATIENT SURGERY
Discharge: HOME | End: 2024-01-23
Payer: COMMERCIAL

## 2024-01-23 ENCOUNTER — ANESTHESIA (OUTPATIENT)
Dept: GASTROENTEROLOGY | Facility: AMBULARY SURGERY CENTER | Age: 54
End: 2024-01-23

## 2024-01-23 ENCOUNTER — ANESTHESIA EVENT (OUTPATIENT)
Dept: GASTROENTEROLOGY | Facility: AMBULARY SURGERY CENTER | Age: 54
End: 2024-01-23

## 2024-01-23 VITALS
BODY MASS INDEX: 32.15 KG/M2 | HEART RATE: 77 BPM | DIASTOLIC BLOOD PRESSURE: 77 MMHG | RESPIRATION RATE: 19 BRPM | WEIGHT: 193 LBS | HEIGHT: 65 IN | SYSTOLIC BLOOD PRESSURE: 133 MMHG | OXYGEN SATURATION: 98 % | TEMPERATURE: 97.5 F

## 2024-01-23 DIAGNOSIS — Z90.3 H/O GASTRIC SLEEVE: ICD-10-CM

## 2024-01-23 DIAGNOSIS — Z12.11 SCREENING FOR COLON CANCER: ICD-10-CM

## 2024-01-23 DIAGNOSIS — K20.90 ESOPHAGITIS: Primary | ICD-10-CM

## 2024-01-23 PROCEDURE — 43239 EGD BIOPSY SINGLE/MULTIPLE: CPT | Performed by: INTERNAL MEDICINE

## 2024-01-23 PROCEDURE — 45380 COLONOSCOPY AND BIOPSY: CPT | Performed by: INTERNAL MEDICINE

## 2024-01-23 PROCEDURE — 88305 TISSUE EXAM BY PATHOLOGIST: CPT | Performed by: STUDENT IN AN ORGANIZED HEALTH CARE EDUCATION/TRAINING PROGRAM

## 2024-01-23 RX ORDER — ALPRAZOLAM 0.25 MG/1
0.25 TABLET ORAL
COMMUNITY

## 2024-01-23 RX ORDER — PROPOFOL 10 MG/ML
INJECTION, EMULSION INTRAVENOUS AS NEEDED
Status: DISCONTINUED | OUTPATIENT
Start: 2024-01-23 | End: 2024-01-23

## 2024-01-23 RX ORDER — OMEPRAZOLE 40 MG/1
40 CAPSULE, DELAYED RELEASE ORAL DAILY
Qty: 90 CAPSULE | Refills: 3 | Status: SHIPPED | OUTPATIENT
Start: 2024-01-23

## 2024-01-23 RX ORDER — SODIUM CHLORIDE, SODIUM LACTATE, POTASSIUM CHLORIDE, CALCIUM CHLORIDE 600; 310; 30; 20 MG/100ML; MG/100ML; MG/100ML; MG/100ML
INJECTION, SOLUTION INTRAVENOUS CONTINUOUS PRN
Status: DISCONTINUED | OUTPATIENT
Start: 2024-01-23 | End: 2024-01-23

## 2024-01-23 RX ORDER — PROPOFOL 10 MG/ML
INJECTION, EMULSION INTRAVENOUS CONTINUOUS PRN
Status: DISCONTINUED | OUTPATIENT
Start: 2024-01-23 | End: 2024-01-23

## 2024-01-23 RX ORDER — LIDOCAINE HYDROCHLORIDE 10 MG/ML
INJECTION, SOLUTION EPIDURAL; INFILTRATION; INTRACAUDAL; PERINEURAL AS NEEDED
Status: DISCONTINUED | OUTPATIENT
Start: 2024-01-23 | End: 2024-01-23

## 2024-01-23 RX ADMIN — LIDOCAINE HYDROCHLORIDE 50 MG: 10 INJECTION, SOLUTION EPIDURAL; INFILTRATION; INTRACAUDAL; PERINEURAL at 14:18

## 2024-01-23 RX ADMIN — PROPOFOL 20 MG: 10 INJECTION, EMULSION INTRAVENOUS at 14:20

## 2024-01-23 RX ADMIN — PROPOFOL 140 MCG/KG/MIN: 10 INJECTION, EMULSION INTRAVENOUS at 14:29

## 2024-01-23 RX ADMIN — PROPOFOL 50 MG: 10 INJECTION, EMULSION INTRAVENOUS at 14:21

## 2024-01-23 RX ADMIN — SODIUM CHLORIDE, SODIUM LACTATE, POTASSIUM CHLORIDE, AND CALCIUM CHLORIDE: .6; .31; .03; .02 INJECTION, SOLUTION INTRAVENOUS at 14:12

## 2024-01-23 RX ADMIN — PROPOFOL 180 MG: 10 INJECTION, EMULSION INTRAVENOUS at 14:18

## 2024-01-23 NOTE — H&P
History and Physical - [facility]  Marci Lopez 53 y.o. female MRN: [id number]                  HPI: Marci Lopez is a 53 y.o. year old female who presents for history of gastric sleeve, colon polyps.      REVIEW OF SYSTEMS: Per the HPI, and otherwise unremarkable.    Historical Information   Past Medical History:   Diagnosis Date    Allergic rhinitis     Anxiety     Colon polyp     Disease of thyroid gland     Endometriosis     Fibroid     Hypertension     Kidney stones     left side    Migraine     teenager     Past Surgical History:   Procedure Laterality Date    ABDOMINAL SURGERY  2023    tummy tuck hernia repair    ABDOMINOPLASTY      APPENDECTOMY      CHOLECYSTECTOMY      FOOT FRACTURE SURGERY      HERNIA REPAIR      umbilical x 3 last one 2023    KNEE CARTILAGE SURGERY Right     and ACL    LAPAROSCOPIC GASTRIC BANDING      removed    OVARIAN CYST SURGERY      SHOULDER SURGERY Right     x 3    STOMACH SURGERY  2023    tummy bonilla     Social History   Social History     Substance and Sexual Activity   Alcohol Use Not Currently     Social History     Substance and Sexual Activity   Drug Use Not Currently    Types: Marijuana    Comment: gummies to sleep for aniety and pain     Social History     Tobacco Use   Smoking Status Former    Current packs/day: 0.00    Average packs/day: 0.2 packs/day for 44.1 years (11.0 ttl pk-yrs)    Types: Cigarettes    Start date: 6/15/1984    Quit date: 2023    Years since quittin.7    Passive exposure: Past   Smokeless Tobacco Never     Family History   Problem Relation Age of Onset    Stroke Mother     Hypertension Mother     Diabetes Father     Coronary artery disease Father     Pancreatic cancer Father     Heart disease Father     Cancer Father         pancreatic    Hypertension Father     Diabetes Sister     Psoriasis Sister     Hypertension Sister     Breast cancer Maternal Grandmother 80    Breast cancer Maternal Aunt 58     "Breast cancer Paternal Aunt         unkown age       Meds/Allergies       Current Outpatient Medications:     ALPRAZolam (XANAX) 0.25 mg tablet    amLODIPine (NORVASC) 10 mg tablet    halobetasol (ULTRAVATE) 0.05 % ointment    metoprolol succinate (TOPROL-XL) 50 mg 24 hr tablet    Allergies   Allergen Reactions    Adhesive [Medical Tape] Rash     welts and burns    Betadine [Povidone Iodine] Rash     Burn and swelling       Objective     /85   Pulse 69   Temp (!) 96.9 °F (36.1 °C) (Temporal)   Resp 17   Ht 5' 5\" (1.651 m)   Wt 87.5 kg (193 lb)   SpO2 99%   BMI 32.12 kg/m²       PHYSICAL EXAM    Gen: NAD  Head: NCAT  CV: RRR  CHEST: Clear  ABD: soft, NT/ND  EXT: no edema      ASSESSMENT/PLAN:  This is a 53 y.o. year old female here for EGD & colonoscopy, and she is stable and optimized for her procedure.        "

## 2024-01-23 NOTE — ANESTHESIA POSTPROCEDURE EVALUATION
Post-Op Assessment Note    CV Status:  Stable  Pain Score: 0    Pain management: adequate       Mental Status:  Alert and awake   Hydration Status:  Euvolemic   PONV Controlled:  Controlled   Airway Patency:  Patent     Post Op Vitals Reviewed: Yes    No anethesia notable event occurred.    Staff: Anesthesiologist, CRNA               /75 (01/23/24 1443)    Temp      Pulse 79 (01/23/24 1443)   Resp 12 (01/23/24 1443)    SpO2 100 % (01/23/24 1443)

## 2024-01-23 NOTE — ANESTHESIA PREPROCEDURE EVALUATION
Procedure:  COLONOSCOPY  EGD    Relevant Problems   CARDIO   (+) Essential hypertension      NEURO/PSYCH   (+) Anxiety   (+) Current severe episode of major depressive disorder without psychotic features without prior episode (HCC)        Physical Exam    Airway    Mallampati score: II  TM Distance: >3 FB  Neck ROM: full     Dental   No notable dental hx     Cardiovascular  Rhythm: regular, Rate: normal    Pulmonary   Breath sounds clear to auscultation    Other Findings  post-pubertal.      Anesthesia Plan  ASA Score- 2     Anesthesia Type- IV sedation with anesthesia with ASA Monitors.         Additional Monitors:     Airway Plan:            Plan Factors-Exercise tolerance (METS): >4 METS.    Chart reviewed.   Existing labs reviewed. Patient summary reviewed.    Patient is not a current smoker.      Obstructive sleep apnea risk education given perioperatively.        Induction- intravenous.    Postoperative Plan-     Informed Consent- Anesthetic plan and risks discussed with patient.  I personally reviewed this patient with the CRNA. Discussed and agreed on the Anesthesia Plan with the CRNA..

## 2024-01-26 PROCEDURE — 88305 TISSUE EXAM BY PATHOLOGIST: CPT | Performed by: STUDENT IN AN ORGANIZED HEALTH CARE EDUCATION/TRAINING PROGRAM

## 2024-02-12 ENCOUNTER — TELEPHONE (OUTPATIENT)
Age: 54
End: 2024-02-12

## 2024-02-12 DIAGNOSIS — E66.09 CLASS 1 OBESITY DUE TO EXCESS CALORIES WITH SERIOUS COMORBIDITY AND BODY MASS INDEX (BMI) OF 34.0 TO 34.9 IN ADULT: Primary | ICD-10-CM

## 2024-02-12 DIAGNOSIS — Z76.89 ENCOUNTER FOR WEIGHT MANAGEMENT: ICD-10-CM

## 2024-02-12 NOTE — TELEPHONE ENCOUNTER
Called patient- she stated she will wait for next week when Dr. Tompkins is back for a call from her since she knows patient's medical history.  Cathy Ortega, CMA

## 2024-02-12 NOTE — TELEPHONE ENCOUNTER
The patient call to schedule an appointment with Dr. Tompkins to discuss about the medication ozempic. The patient state that she spoke with the provider about the medication back in December and she would like to know what is the next step to get the prescription.     Patient was schedule for 03/01/2024 - the patient state this appointment is really far and would like a call back from Dr. Tompkins

## 2024-02-20 ENCOUNTER — TELEPHONE (OUTPATIENT)
Age: 54
End: 2024-02-20

## 2024-02-20 NOTE — TELEPHONE ENCOUNTER
PA for Wegovy 0.25 MG/0.5ML     Submitted via    []CMM-KEY   [x][employer]-Case ID # [id number]  []Faxed to plan   []Other website   []Phone call Case ID #     Office notes sent, clinical questions answered. Awaiting determination    Turnaround time for your insurance to make a decision on your Prior Authorization can take 7-21 business days.

## 2024-02-20 NOTE — TELEPHONE ENCOUNTER
2/20/2024 9:30 AM returned call to Marci  She has obesity that is complicated by hypertension   Will start wegovy  Risks and benefits of medication discussed.      Explained that she can't take ozempic because it is for diabetics.    Message complete  Farhana Tompkins, DO

## 2024-02-20 NOTE — TELEPHONE ENCOUNTER
PA for Wegovy 0.25 MG/0.5ML Approved     Date(s) approved 1- to 9-        Patient advised by [x] [employer] Message                      [] Phone call       Pharmacy advised by [x]Fax                                     []Phone call    Approval letter scanned into Media No not available at this time

## 2024-02-23 DIAGNOSIS — L40.9 PSORIASIS: ICD-10-CM

## 2024-02-26 ENCOUNTER — TELEPHONE (OUTPATIENT)
Age: 54
End: 2024-02-26

## 2024-02-26 DIAGNOSIS — E66.09 CLASS 1 OBESITY DUE TO EXCESS CALORIES WITH SERIOUS COMORBIDITY AND BODY MASS INDEX (BMI) OF 34.0 TO 34.9 IN ADULT: Primary | ICD-10-CM

## 2024-02-26 NOTE — TELEPHONE ENCOUNTER
The patient call to repot that [facility], [facility], [facility], [facility]  do not have wegovy medication available,     She would like to know what other option she have available

## 2024-02-27 ENCOUNTER — TELEPHONE (OUTPATIENT)
Age: 54
End: 2024-02-27

## 2024-02-27 RX ORDER — TIRZEPATIDE 2.5 MG/.5ML
2.5 INJECTION, SOLUTION SUBCUTANEOUS WEEKLY
Qty: 2 ML | Refills: 0 | Status: SHIPPED | OUTPATIENT
Start: 2024-02-27 | End: 2024-03-26

## 2024-02-27 NOTE — TELEPHONE ENCOUNTER
Please let her know that I sent in a prescription for Zepbound instead.  This is the new injectable medication for weight loss  Thank you,  Farhana Tompkins, DO

## 2024-02-27 NOTE — TELEPHONE ENCOUNTER
PA for tirzepatide (Zepbound) 2.5 mg/0.5 mL auto-injector    Submitted via    []CMTagbrand-KEY   [x][employer]-Case ID # [id number]  []Faxed to plan   []Other website   []Phone call Case ID #     Office notes sent, clinical questions answered. Awaiting determination    Turnaround time for your insurance to make a decision on your Prior Authorization can take 7-21 business days.

## 2024-02-27 NOTE — TELEPHONE ENCOUNTER
PA for  tirzepatide (Zepbound) 2.5 mg/0.5 mL auto-injector Approved     Date(s) approved 2- to 10-        Patient advised by [x] [employer] Message                      [] Phone call       Pharmacy advised by [x]Fax                                     []Phone call    Approval letter scanned into Media No not available at this time

## 2024-03-21 DIAGNOSIS — L40.9 PSORIASIS: ICD-10-CM

## 2024-03-21 DIAGNOSIS — E66.09 CLASS 1 OBESITY DUE TO EXCESS CALORIES WITH SERIOUS COMORBIDITY AND BODY MASS INDEX (BMI) OF 34.0 TO 34.9 IN ADULT: ICD-10-CM

## 2024-03-22 RX ORDER — TIRZEPATIDE 2.5 MG/.5ML
2.5 INJECTION, SOLUTION SUBCUTANEOUS WEEKLY
Qty: 2 ML | Refills: 1 | Status: SHIPPED | OUTPATIENT
Start: 2024-03-22 | End: 2024-04-19

## 2024-03-25 DIAGNOSIS — L40.9 PSORIASIS: ICD-10-CM

## 2024-03-25 RX ORDER — HALOBETASOL PROPIONATE 0.05 %
OINTMENT (GRAM) TOPICAL 2 TIMES DAILY
Qty: 50 G | Refills: 1 | Status: SHIPPED | OUTPATIENT
Start: 2024-03-25

## 2024-03-25 NOTE — TELEPHONE ENCOUNTER
Patient wants the ointment. Cream does not work.    Reason for call:   [x] Refill   [] Prior Auth  [] Other:     Office:   [x] PCP/Provider -   [] Specialty/Provider -     Medication: Ultravate    Dose/Frequency: 0.05 % ointment    Quantity: 50 g     [facility]    Does the patient have enough for 3 days?   [] Yes   [x] No - Send as HP to POD

## 2024-04-22 ENCOUNTER — ANNUAL EXAM (OUTPATIENT)
Dept: OBGYN CLINIC | Facility: CLINIC | Age: 54
End: 2024-04-22
Payer: COMMERCIAL

## 2024-04-22 VITALS
BODY MASS INDEX: 33.02 KG/M2 | DIASTOLIC BLOOD PRESSURE: 88 MMHG | HEIGHT: 65 IN | WEIGHT: 198.2 LBS | SYSTOLIC BLOOD PRESSURE: 142 MMHG

## 2024-04-22 DIAGNOSIS — Z12.31 ENCOUNTER FOR SCREENING MAMMOGRAM FOR MALIGNANT NEOPLASM OF BREAST: ICD-10-CM

## 2024-04-22 DIAGNOSIS — Z01.419 WOMEN'S ANNUAL ROUTINE GYNECOLOGICAL EXAMINATION: ICD-10-CM

## 2024-04-22 DIAGNOSIS — R30.0 DYSURIA: Primary | ICD-10-CM

## 2024-04-22 PROBLEM — M25.811 IMPINGEMENT OF RIGHT SHOULDER: Status: ACTIVE | Noted: 2023-11-27

## 2024-04-22 PROBLEM — M24.811 INTERNAL DERANGEMENT OF RIGHT SHOULDER: Status: RESOLVED | Noted: 2023-10-30 | Resolved: 2024-04-22

## 2024-04-22 PROBLEM — I72.4 POPLITEAL ARTERY ANEURYSM (HCC): Status: ACTIVE | Noted: 2024-01-02

## 2024-04-22 PROBLEM — M47.812 CERVICAL SPONDYLOSIS WITHOUT MYELOPATHY: Status: ACTIVE | Noted: 2024-04-22

## 2024-04-22 LAB
SL AMB  POCT GLUCOSE, UA: NEGATIVE
SL AMB LEUKOCYTE ESTERASE,UA: ABNORMAL
SL AMB POCT BILIRUBIN,UA: ABNORMAL
SL AMB POCT BLOOD,UA: NEGATIVE
SL AMB POCT CLARITY,UA: ABNORMAL
SL AMB POCT COLOR,UA: ABNORMAL
SL AMB POCT KETONES,UA: ABNORMAL
SL AMB POCT NITRITE,UA: POSITIVE
SL AMB POCT PH,UA: 5.5
SL AMB POCT SPECIFIC GRAVITY,UA: 1.03
SL AMB POCT URINE PROTEIN: ABNORMAL
SL AMB POCT UROBILINOGEN: NEGATIVE

## 2024-04-22 PROCEDURE — 87086 URINE CULTURE/COLONY COUNT: CPT | Performed by: OBSTETRICS & GYNECOLOGY

## 2024-04-22 PROCEDURE — S0612 ANNUAL GYNECOLOGICAL EXAMINA: HCPCS | Performed by: OBSTETRICS & GYNECOLOGY

## 2024-04-22 PROCEDURE — 81002 URINALYSIS NONAUTO W/O SCOPE: CPT | Performed by: OBSTETRICS & GYNECOLOGY

## 2024-04-22 PROCEDURE — 87186 SC STD MICRODIL/AGAR DIL: CPT | Performed by: OBSTETRICS & GYNECOLOGY

## 2024-04-22 PROCEDURE — 87077 CULTURE AEROBIC IDENTIFY: CPT | Performed by: OBSTETRICS & GYNECOLOGY

## 2024-04-22 RX ORDER — DIAZEPAM 5 MG/1
5 TABLET ORAL
COMMUNITY
Start: 2024-02-07

## 2024-04-22 RX ORDER — METHYLPREDNISOLONE 4 MG
TABLET, DOSE PACK ORAL
COMMUNITY
Start: 2024-04-18 | End: 2024-04-24

## 2024-04-22 RX ORDER — BUTALBITAL, ACETAMINOPHEN, AND CAFFEINE 50; 300; 40 MG/1; MG/1; MG/1
CAPSULE ORAL
COMMUNITY
Start: 2024-04-18 | End: 2024-04-28

## 2024-04-22 NOTE — PROGRESS NOTES
ASSESSMENT & PLAN:   Diagnoses and all orders for this visit:    Women's annual routine gynecological examination    Encounter for screening mammogram for malignant neoplasm of breast  -     Mammo screening bilateral w 3d & cad; Future    Other orders  -     Celecoxib (CELEBREX PO); CeleBREX  -     METOPROLOL SUCCINATE PO; Metoprolol Succinate  -     Medrol 4 MG tablet therapy pack; as directed Orally as directed for 6 days  -     diazepam (VALIUM) 5 mg tablet; Take 5 mg by mouth daily at bedtime as needed  -     Fioricet -40 MG CAPS; 1 capsule as needed Orally every 4-6 hrs for 10 days          The following were reviewed in today's visit: ASCCP guidelines, Gardisil vaccination, STD testing breast self exam, mammography screening ordered, menopause, exercise, and healthy diet.    Patient to return to office in yearly for annual exam.     All questions have been answered to her satisfaction.        CC:  Annual Gynecologic Examination  Chief Complaint   Patient presents with    Gynecologic Exam     Pap 23 wnl, hpv-   Mammo 23  Colonoscopy 24, repeat 5 years        HPI: Marci BRADLEY John is a 54 y.o.  who presents for annual gynecologic examination.  She has the following concerns:  none      Health Maintenance:    Exercise: intermittently  Breast exams/breast awareness: yes  Last mammogram:   Colorectal cancer screenin    Past Medical History:   Diagnosis Date    Allergic rhinitis     Anxiety     Colon polyp     Disease of thyroid gland     Endometriosis     Fibroid     Hypertension     Kidney stones     left side    Migraine     teenager       Past Surgical History:   Procedure Laterality Date    ABDOMINAL SURGERY  2023    tummy tuck hernia repair    ABDOMINOPLASTY      APPENDECTOMY      CHOLECYSTECTOMY      FOOT FRACTURE SURGERY      HERNIA REPAIR      umbilical x 3 last one 2023    KNEE CARTILAGE SURGERY Right     and ACL    LAPAROSCOPIC GASTRIC BANDING       removed    OVARIAN CYST SURGERY      SHOULDER SURGERY Right     x 3    STOMACH SURGERY  2023    tummy tuck       Past OB/Gyn History:   No LMP recorded. Patient is postmenopausal.    Menopausal status: postmenopausal  Menopausal symptoms: None    Last Pap:  : no abnormalities  History of abnormal Pap smear: no    Patient is currently sexually active.   STD testing: no  Current contraception: condoms      Family History  Family History   Problem Relation Age of Onset    Stroke Mother     Hypertension Mother     Diabetes Father     Coronary artery disease Father     Pancreatic cancer Father     Heart disease Father     Cancer Father         pancreatic    Hypertension Father     Diabetes Sister     Psoriasis Sister     Hypertension Sister     Breast cancer Maternal Grandmother 80    Breast cancer Maternal Aunt 58    Breast cancer Paternal Aunt         unkown age       Family history of uterine or ovarian cancer: no  Family history of breast cancer: no  Family history of colon cancer: no    Social History:  Social History     Socioeconomic History    Marital status: Single     Spouse name: Not on file    Number of children: Not on file    Years of education: Not on file    Highest education level: Not on file   Occupational History    Not on file   Tobacco Use    Smoking status: Former     Current packs/day: 0.00     Average packs/day: 0.3 packs/day for 44.0 years (11.0 ttl pk-yrs)     Types: Cigarettes     Start date: 6/15/1984     Quit date: 2023     Years since quittin.9     Passive exposure: Past    Smokeless tobacco: Never   Vaping Use    Vaping status: Never Used   Substance and Sexual Activity    Alcohol use: Not Currently    Drug use: Not Currently     Types: Marijuana     Comment: gummies to sleep for aniety and pain    Sexual activity: Yes     Partners: Male     Birth control/protection: Condom Male, Post-menopausal   Other Topics Concern    Not on file   Social History Narrative    Not on file      Social Determinants of Health     Financial Resource Strain: Not on file   Food Insecurity: No Food Insecurity (6/9/2023)    Received from [facility]    Hunger Vital Sign     Worried About Running Out of Food in the Last Year: Never true     Ran Out of Food in the Last Year: Never true   Transportation Needs: No Transportation Needs (6/9/2023)    Received from [facility]    PRAPARE - Transportation     Lack of Transportation (Medical): No     Lack of Transportation (Non-Medical): No   Physical Activity: Not on file   Stress: Not on file   Social Connections: Not on file   Intimate Partner Violence: Not on file   Housing Stability: Low Risk  (6/9/2023)    Received from [facility]    Housing Stability Vital Sign     Unable to Pay for Housing in the Last Year: No     Number of Places Lived in the Last Year: 1     Unstable Housing in the Last Year: No     Domestic violence screen: negative    Allergies:  Allergies   Allergen Reactions    Adhesive [Medical Tape] Rash     welts and burns    Betadine [Povidone Iodine] Rash     Burn and swelling       Medications:    Current Outpatient Medications:     ALPRAZolam (XANAX) 0.25 mg tablet, Take 0.25 mg by mouth daily at bedtime as needed for anxiety, Disp: , Rfl:     amLODIPine (NORVASC) 10 mg tablet, Take 10 mg by mouth daily, Disp: , Rfl:     Celecoxib (CELEBREX PO), CeleBREX, Disp: , Rfl:     diazepam (VALIUM) 5 mg tablet, Take 5 mg by mouth daily at bedtime as needed, Disp: , Rfl:     Fioricet -40 MG CAPS, 1 capsule as needed Orally every 4-6 hrs for 10 days, Disp: , Rfl:     halobetasol (ULTRAVATE) 0.05 % ointment, Apply topically 2 (two) times a day To affected area, Disp: 50 g, Rfl: 1    Medrol 4 MG tablet therapy pack, as directed Orally as directed for 6 days, Disp: , Rfl:     metoprolol succinate (TOPROL-XL) 50 mg 24 hr tablet, Take 50 mg by mouth daily, Disp: , Rfl:     METOPROLOL SUCCINATE PO, Metoprolol  "Succinate, Disp: , Rfl:     Review of Systems:  Review of Systems   Constitutional: Negative.    HENT: Negative.     Respiratory: Negative.     Cardiovascular: Negative.    Gastrointestinal: Negative.    Genitourinary: Negative.    Neurological: Negative.    Psychiatric/Behavioral: Negative.           Physical Exam:  /88 (BP Location: Left arm, Patient Position: Sitting, Cuff Size: Standard)   Ht 5' 5\" (1.651 m)   Wt 89.9 kg (198 lb 3.2 oz)   BMI 32.98 kg/m²    Physical Exam  Constitutional:       Appearance: Normal appearance.   Genitourinary:      Bladder and urethral meatus normal.      No lesions in the vagina.      Right Labia: No rash, tenderness, lesions, skin changes or Bartholin's cyst.     Left Labia: No tenderness, lesions, skin changes, Bartholin's cyst or rash.     No vaginal erythema, tenderness or bleeding.        Right Adnexa: not tender, not full and no mass present.     Left Adnexa: not tender, not full and no mass present.     Cervix is parous.      No cervical motion tenderness, discharge, lesion or polyp.      Uterus is not enlarged, fixed or tender.      No uterine mass detected.     Urethral meatus caruncle not present.     No urethral tenderness or mass present.   Breasts:     Right: No swelling, bleeding, inverted nipple, mass, nipple discharge, skin change or tenderness.      Left: No swelling, bleeding, inverted nipple, mass, nipple discharge, skin change or tenderness.   HENT:      Head: Normocephalic and atraumatic.   Eyes:      Extraocular Movements: Extraocular movements intact.      Conjunctiva/sclera: Conjunctivae normal.      Pupils: Pupils are equal, round, and reactive to light.   Cardiovascular:      Rate and Rhythm: Normal rate and regular rhythm.      Heart sounds: Normal heart sounds. No murmur heard.  Pulmonary:      Effort: Pulmonary effort is normal. No respiratory distress.      Breath sounds: Normal breath sounds. No wheezing or rales.   Abdominal:      General: " There is no distension.      Palpations: Abdomen is soft.      Tenderness: There is no abdominal tenderness. There is no guarding.   Neurological:      General: No focal deficit present.      Mental Status: She is alert and oriented to person, place, and time.   Skin:     General: Skin is warm and dry.   Psychiatric:         Mood and Affect: Mood normal.         Behavior: Behavior normal.   Vitals and nursing note reviewed.

## 2024-04-24 DIAGNOSIS — N30.00 ACUTE CYSTITIS WITHOUT HEMATURIA: Primary | ICD-10-CM

## 2024-04-24 LAB — BACTERIA UR CULT: ABNORMAL

## 2024-04-24 RX ORDER — NITROFURANTOIN 25; 75 MG/1; MG/1
100 CAPSULE ORAL 2 TIMES DAILY
Qty: 14 CAPSULE | Refills: 0 | Status: SHIPPED | OUTPATIENT
Start: 2024-04-24 | End: 2024-05-01

## 2024-04-24 NOTE — RESULT ENCOUNTER NOTE
Hi Marci,  You indeed do have a UTI  I sent antibiotics to your pharmacy  Please call with questions or concerns  DrG

## 2024-04-29 ENCOUNTER — TELEPHONE (OUTPATIENT)
Age: 54
End: 2024-04-29

## 2024-04-29 DIAGNOSIS — N30.00 ACUTE CYSTITIS WITHOUT HEMATURIA: Primary | ICD-10-CM

## 2024-04-29 RX ORDER — CEPHALEXIN 500 MG/1
500 CAPSULE ORAL EVERY 6 HOURS SCHEDULED
Qty: 28 CAPSULE | Refills: 0 | Status: SHIPPED | OUTPATIENT
Start: 2024-04-29 | End: 2024-05-06

## 2024-04-29 NOTE — TELEPHONE ENCOUNTER
Pt called. Pt stated she was prescribed antibiotic for uti symptoms. Pt stated she is allergic to benzidine and medication had benzidine based and pt did experience an allergic reaction. Pt stated she was unable to finish medication due to reaction from one tablet. Pt would like to know if she can try something else. Please advise.

## 2024-05-22 ENCOUNTER — OFFICE VISIT (OUTPATIENT)
Dept: FAMILY MEDICINE CLINIC | Facility: CLINIC | Age: 54
End: 2024-05-22
Payer: COMMERCIAL

## 2024-05-22 VITALS
RESPIRATION RATE: 16 BRPM | HEART RATE: 76 BPM | HEIGHT: 65 IN | TEMPERATURE: 99.3 F | DIASTOLIC BLOOD PRESSURE: 84 MMHG | BODY MASS INDEX: 33.99 KG/M2 | SYSTOLIC BLOOD PRESSURE: 140 MMHG | WEIGHT: 204 LBS

## 2024-05-22 DIAGNOSIS — J06.9 ACUTE URI: Primary | ICD-10-CM

## 2024-05-22 LAB
S PYO AG THROAT QL: NEGATIVE
SARS-COV-2 AG UPPER RESP QL IA: NEGATIVE
VALID CONTROL: NORMAL

## 2024-05-22 PROCEDURE — 87811 SARS-COV-2 COVID19 W/OPTIC: CPT | Performed by: NURSE PRACTITIONER

## 2024-05-22 PROCEDURE — 99213 OFFICE O/P EST LOW 20 MIN: CPT | Performed by: NURSE PRACTITIONER

## 2024-05-22 PROCEDURE — 87880 STREP A ASSAY W/OPTIC: CPT | Performed by: NURSE PRACTITIONER

## 2024-05-22 RX ORDER — AMOXICILLIN 875 MG/1
875 TABLET, COATED ORAL 2 TIMES DAILY
Qty: 20 TABLET | Refills: 0 | Status: SHIPPED | OUTPATIENT
Start: 2024-05-22 | End: 2024-05-31

## 2024-05-22 RX ORDER — CYCLOBENZAPRINE HCL 10 MG
10 TABLET ORAL
COMMUNITY

## 2024-05-22 RX ORDER — CELECOXIB 200 MG/1
200 CAPSULE ORAL 2 TIMES DAILY
COMMUNITY
Start: 2024-05-13

## 2024-05-22 NOTE — PROGRESS NOTES
"Assessment/Plan:    1. Acute URI  -     amoxicillin (AMOXIL) 875 mg tablet; Take 1 tablet (875 mg total) by mouth 2 (two) times a day for 10 days  -     POCT Rapid Covid Ag  -     POCT rapid ANTIGEN strepA        Patient Instructions:  Take medication with food.  It is important that you take the entire course of antibiotics prescribed.  May also take a probiotic of your choice to maintain healthy GI dk.    Can take some probiotic and yogurt with the medication.  Gargle with warm salt water for 5 minutes every 4 hours.  Drink plenty of fluids at least 6 glasses of water a day.  Can use some honey lemon tea.   Call or follow up if symptoms are not better in 7 days.  Supportive care discussed and advised.  Advised to RTO for any worsening and no improvement.   Follow up for no improvement and worsening of conditions.  Patient advised and educated when to see immediate medical care.    Return if symptoms worsen or fail to improve.      Future Appointments   Date Time Provider Department Center   10/14/2024 10:30 AM Farhana Tompkins DO [facility]   4/28/2025  1:30 PM Zakiya Castillo MD [facility]           Subjective:      Patient ID: Marci Lopez is a 54 y.o. female.    Chief Complaint   Patient presents with   • Sore Throat   • Earache     S/S started yesterday with sore throat and right earache [id number]         Vitals:  /84   Pulse 76   Temp 99.3 °F (37.4 °C)   Resp 16   Ht 5' 5\" (1.651 m)   Wt 92.5 kg (204 lb)   BMI 33.95 kg/m²     Patient stated that started with sore throat couple of days ago and progressed to ear ache. Denies fever, chills and sob. Stated that felt wheezing last night    Sore Throat   Associated symptoms include ear pain. Pertinent negatives include no abdominal pain, congestion, coughing, diarrhea, drooling, ear discharge, headaches, shortness of breath, trouble swallowing or vomiting.   Earache   Associated symptoms include a sore throat. " Pertinent negatives include no abdominal pain, coughing, diarrhea, ear discharge, headaches, hearing loss, rhinorrhea or vomiting.               PHQ-2/9 Depression Screening    Little interest or pleasure in doing things: 0 - not at all  Feeling down, depressed, or hopeless: 0 - not at all  Trouble falling or staying asleep, or sleeping too much: 0 - not at all  Feeling tired or having little energy: 0 - not at all  Poor appetite or overeatin - not at all  Feeling bad about yourself - or that you are a failure or have let yourself or your family down: 0 - not at all  Trouble concentrating on things, such as reading the newspaper or watching television: 0 - not at all  Moving or speaking so slowly that other people could have noticed. Or the opposite - being so fidgety or restless that you have been moving around a lot more than usual: 0 - not at all  Thoughts that you would be better off dead, or of hurting yourself in some way: 0 - not at all  PHQ-9 Score: 0  PHQ-9 Interpretation: No or Minimal depression             The following portions of the patient's history were reviewed and updated as appropriate: allergies, current medications, past family history, past medical history, past social history, past surgical history and problem list.      Review of Systems   Constitutional:  Negative for chills, diaphoresis, fatigue, fever and unexpected weight change.   HENT:  Positive for ear pain and sore throat. Negative for congestion, dental problem, drooling, ear discharge, facial swelling, hearing loss, mouth sores, nosebleeds, postnasal drip, rhinorrhea, sinus pressure, sinus pain, sneezing, tinnitus, trouble swallowing and voice change.    Respiratory:  Negative for cough, chest tightness, shortness of breath and wheezing.    Cardiovascular: Negative.    Gastrointestinal:  Negative for abdominal pain, constipation, diarrhea, nausea and vomiting.   Skin: Negative.    Neurological:  Negative for dizziness,  light-headedness and headaches.         Objective:    Social History     Tobacco Use   Smoking Status Former   • Current packs/day: 0.00   • Average packs/day: 0.3 packs/day for 44.0 years (11.0 ttl pk-yrs)   • Types: Cigarettes   • Start date: 6/15/1984   • Quit date: 2023   • Years since quittin.0   • Passive exposure: Past   Smokeless Tobacco Never       Allergies:   Allergies   Allergen Reactions   • Adhesive [Medical Tape] Rash     welts and burns   • Betadine [Povidone Iodine] Rash     Burn and swelling   • Macrobid [Nitrofurantoin] Throat Swelling         Current Outpatient Medications   Medication Sig Dispense Refill   • ALPRAZolam (XANAX) 0.25 mg tablet Take 0.25 mg by mouth daily at bedtime as needed for anxiety     • amLODIPine (NORVASC) 10 mg tablet Take 10 mg by mouth daily     • amoxicillin (AMOXIL) 875 mg tablet Take 1 tablet (875 mg total) by mouth 2 (two) times a day for 10 days 20 tablet 0   • celecoxib (CeleBREX) 200 mg capsule 200 mg 2 (two) times a day     • cyclobenzaprine (FLEXERIL) 10 mg tablet 10 mg PRN     • diazepam (VALIUM) 5 mg tablet Take 5 mg by mouth daily at bedtime as needed     • halobetasol (ULTRAVATE) 0.05 % ointment Apply topically 2 (two) times a day To affected area 50 g 1   • metoprolol succinate (TOPROL-XL) 50 mg 24 hr tablet Take 50 mg by mouth daily     • Celecoxib (CELEBREX PO) 2 (two) times a day (Patient not taking: Reported on 2024)     • METOPROLOL SUCCINATE PO Metoprolol Succinate       No current facility-administered medications for this visit.          Physical Exam  Vitals reviewed.   Constitutional:       Appearance: Normal appearance. She is well-developed.   HENT:      Head: Normocephalic.      Right Ear: Tympanic membrane, ear canal and external ear normal.      Left Ear: Tympanic membrane, ear canal and external ear normal.      Nose: Mucosal edema present.      Right Sinus: No maxillary sinus tenderness or frontal sinus tenderness.      Left  Sinus: No maxillary sinus tenderness or frontal sinus tenderness.      Mouth/Throat:      Mouth: No oral lesions.      Pharynx: No oropharyngeal exudate or posterior oropharyngeal erythema.   Cardiovascular:      Rate and Rhythm: Normal rate and regular rhythm.      Heart sounds: Normal heart sounds.   Pulmonary:      Effort: Pulmonary effort is normal.      Breath sounds: Normal breath sounds.   Musculoskeletal:         General: Normal range of motion.      Cervical back: Neck supple.   Lymphadenopathy:      Cervical:      Right cervical: No superficial or posterior cervical adenopathy.     Left cervical: No superficial or posterior cervical adenopathy.   Skin:     General: Skin is warm and dry.   Neurological:      Mental Status: She is alert and oriented to person, place, and time.   Psychiatric:         Behavior: Behavior normal.         Thought Content: Thought content normal.         Judgment: Judgment normal.             Recent Results (from the past 24 hour(s))   POCT Rapid Covid Ag    Collection Time: 05/22/24  1:34 PM   Result Value Ref Range    POCT SARS-CoV-2 Ag Negative Negative    VALID CONTROL Valid    POCT rapid ANTIGEN strepA    Collection Time: 05/22/24  1:34 PM   Result Value Ref Range     RAPID STREP A Negative Negative             DALLAS Yadav

## 2024-05-22 NOTE — PATIENT INSTRUCTIONS
Amoxicillin (By mouth)   Amoxicillin (t-[id number]-u-[id number]-in)  Treats infections or stomach ulcers. This medicine is a penicillin antibiotic.   Brand Name(s): Moxatajasvir Prevpac   There may be other brand names for this medicine.  When This Medicine Should Not Be Used:   This medicine is not right for everyone. You should not use it if you had an allergic reaction to amoxicillin, any type of penicillin, or a cephalosporin antibiotic.  How to Use This Medicine:   Capsule, Liquid, Tablet, Chewable Tablet, Long Acting Tablet  Your doctor will tell you how much medicine to use. Do not use more than directed.  Chewable tablet: You must chew the tablet before you swallow it. You may crush the tablet and mix the medicine with a small amount of food to make it easier to swallow.  Oral liquid: Shake well just before each use.  Measure the oral liquid medicine with a marked measuring spoon, oral syringe, or medicine cup. You may mix the oral liquid with a baby formula, milk, fruit juice, water, ginger ale, or another cold drink. Be sure your child drinks all of the mixture right away.  Tablet for suspension: Place the tablet in a small drinking glass, and add 2 teaspoons of water. Do not use any other liquid. Gently stir or swirl the water in the glass until the tablet is completely dissolved. Drink all of this mixture right away. Add more water to the glass and drink all of it to make sure you get all of the medicine. Do not chew or swallow the tablet for suspension.  Take all of the medicine in your prescription to clear up your infection, even if you feel better after the first few doses.  Take a dose as soon as you remember. If it is almost time for your next dose, wait until then and take a regular dose. Do not take extra medicine to make up for a missed dose.  Store the tablets, capsules, and tablets for suspension at room temperature, away from heat, moisture, and direct light.  Store the oral liquid in the refrigerator. Do not  freeze. Throw away any unused medicine after 14 days.  Drugs and Foods to Avoid:   Ask your doctor or pharmacist before using any other medicine, including over-the-counter medicines, vitamins, and herbal products.  Some medicines can affect how amoxicillin works. Tell your doctor if you are also using any of the following:   Allopurinol  Probenecid  Birth control pills  A blood thinner  Warnings While Using This Medicine:   Tell your doctor if you are pregnant or breastfeeding, or if you have kidney disease, allergies, or a condition called phenylketonuria (PKU). Tell your doctor if you are on dialysis.  This medicine can cause diarrhea. Call your doctor if the diarrhea becomes severe, does not stop, or is bloody. Do not take any medicine to stop diarrhea until you have talked to your doctor. Diarrhea can occur 2 months or more after you stop taking this medicine.  Tell any doctor or dentist who treats you that you are using this medicine. This medicine may affect certain medical test results.  Call your doctor if your symptoms do not improve or if they get worse.  Use this medicine to treat only the infection your doctor has prescribed it for. Do not use this medicine for any infection or condition that has not been checked by a doctor. This medicine will not treat the flu or the common cold.  Keep all medicine out of the reach of children. Never share your medicine with anyone.  Possible Side Effects While Using This Medicine:   Call your doctor right away if you notice any of these side effects:  Allergic reaction: Itching or hives, swelling in your face or hands, swelling or tingling in your mouth or throat, chest tightness, trouble breathing  Blistering, peeling, or red skin rash  Diarrhea that may contain blood, stomach cramps, fever  If you notice these less serious side effects, talk with your doctor:   Mild diarrhea, nausea, or vomiting  Mild skin rash  If you notice other side effects that you think are  caused by this medicine, tell your doctor.   Call your doctor for medical advice about side effects. You may report side effects to FDA at 1-442-BOO-3190  © Copyright Merative 2023 Information is for End User's use only and may not be sold, redistributed or otherwise used for commercial purposes.  The above information is an  only. It is not intended as medical advice for individual conditions or treatments. Talk to your doctor, nurse or pharmacist before following any medical regimen to see if it is safe and effective for you.

## 2024-05-24 ENCOUNTER — NURSE TRIAGE (OUTPATIENT)
Age: 54
End: 2024-05-24

## 2024-05-24 ENCOUNTER — TELEPHONE (OUTPATIENT)
Age: 54
End: 2024-05-24

## 2024-05-24 ENCOUNTER — APPOINTMENT (OUTPATIENT)
Dept: RADIOLOGY | Facility: CLINIC | Age: 54
End: 2024-05-24
Payer: COMMERCIAL

## 2024-05-24 DIAGNOSIS — J06.9 ACUTE URI: Primary | ICD-10-CM

## 2024-05-24 DIAGNOSIS — J06.9 ACUTE URI: ICD-10-CM

## 2024-05-24 PROCEDURE — 71046 X-RAY EXAM CHEST 2 VIEWS: CPT

## 2024-05-24 RX ORDER — METHYLPREDNISOLONE 4 MG/1
TABLET ORAL
Qty: 21 TABLET | Refills: 0 | Status: SHIPPED | OUTPATIENT
Start: 2024-05-24 | End: 2024-05-31

## 2024-05-24 RX ORDER — DEXTROMETHORPHAN HYDROBROMIDE AND PROMETHAZINE HYDROCHLORIDE 15; 6.25 MG/5ML; MG/5ML
5 SYRUP ORAL 4 TIMES DAILY PRN
Qty: 118 ML | Refills: 0 | Status: SHIPPED | OUTPATIENT
Start: 2024-05-24 | End: 2024-05-24 | Stop reason: SDUPTHER

## 2024-05-24 RX ORDER — DEXTROMETHORPHAN HYDROBROMIDE AND PROMETHAZINE HYDROCHLORIDE 15; 6.25 MG/5ML; MG/5ML
5 SYRUP ORAL 4 TIMES DAILY PRN
Qty: 118 ML | Refills: 0 | Status: SHIPPED | OUTPATIENT
Start: 2024-05-24 | End: 2024-05-28 | Stop reason: SDUPTHER

## 2024-05-24 NOTE — TELEPHONE ENCOUNTER
I sent cough medication for the patient.  I also sent steroid pack for wheezing and Take prednisone with food in morning and do not take any NSAID's while taking prednisone.  I ordered chest xray to check for pneumonia. Xray usually do not show lung nodules and for that she will follow up with Dr. Tompkins later and can discuss how frequently that needs to be monitored. DALLAS Yadav

## 2024-05-24 NOTE — TELEPHONE ENCOUNTER
I sent it before and now sent it again and [facility] if they area getting e prescription then give them verbal. DALLAS Yadav

## 2024-05-24 NOTE — TELEPHONE ENCOUNTER
Pt called because she is coughing so much that her chest muscles are sore. She also has body aches. She was seen on Wed and given abx. She is asking if something can be sent in for the cough. She said she is not feeling better. [facility] [place]. Please, advise.     Pt said she is getting worse not better. Pt said she is wheezing and is concerned for pneumonia. Due to worsening symptoms and wheezing and chest discomfort, transferred to [facility] to discuss.

## 2024-05-24 NOTE — TELEPHONE ENCOUNTER
[facility] called because they still have not received the fax with the script for cough medication and the patient was ready to leave, I provided a verbal of the information on the script so that patient can get prescription.

## 2024-05-24 NOTE — TELEPHONE ENCOUNTER
Spoke with patient and gave them providers message.  NFA  Tomeka Mae, SUNITA       Prednisone Pregnancy And Lactation Text: This medication is Pregnancy Category C and it isn't know if it is safe during pregnancy. This medication is excreted in breast milk.

## 2024-05-24 NOTE — TELEPHONE ENCOUNTER
"Reason for Disposition  • SEVERE coughing spells (e.g., whooping sound after coughing, vomiting after coughing)    Answer Assessment - Initial Assessment Questions  1. ONSET: \"When did the cough begin?\"           Cough severe non stop   DRY COUGH.  Patient having coughing spells.    Intermittent wheezing      2. SEVERITY: \"How bad is the cough today?\"           Severe       3. SPUTUM: \"Describe the color of your sputum\" (none, dry cough; clear, white, yellow, green)          Denies        4. HEMOPTYSIS: \"Are you coughing up any blood?\" If so ask: \"How much?\" (flecks, streaks, tablespoons, etc.)        Denies        5. DIFFICULTY BREATHING: \"Are you having difficulty breathing?\" If Yes, ask: \"How bad is it?\" (e.g., mild, moderate, severe)     - MILD: No SOB at rest, mild SOB with walking, speaks normally in sentences, can lay down, no retractions, pulse < 100.     - MODERATE: SOB at rest, SOB with minimal exertion and prefers to sit, cannot lie down flat, speaks in phrases, mild retractions, audible wheezing, pulse 100-120.     - SEVERE: Very SOB at rest, speaks in single words, struggling to breathe, sitting hunched forward, retractions, pulse > 120           Denies        6. FEVER: \"Do you have a fever?\" If Yes, ask: \"What is your temperature, how was it measured, and when did it start?\"          Denies      7. CARDIAC HISTORY: \"Do you have any history of heart disease?\" (e.g., heart attack, congestive heart failure)           Denies      8. LUNG HISTORY: \"Do you have any history of lung disease?\"  (e.g., pulmonary embolus, asthma, emphysema)        Nodule on lung     9. PE RISK FACTORS: \"Do you have a history of blood clots?\" (or: recent major surgery, recent prolonged travel, bedridden)          Denies        10. OTHER SYMPTOMS: \"Do you have any other symptoms?\" (e.g., runny nose, wheezing, chest pain)            Wheezing  chest soreness from coughing so much    Protocols used: Cough-ADULT-[place]    "

## 2024-05-24 NOTE — TELEPHONE ENCOUNTER
Patient calls in complaining of constant sever dry cough. Patient states she has been coughing so much her chest is sore from coughing. Patent also complains of wheezing intermittent , more so when she lies down.  Patient was seen in the office on 5 22 2024 and prescribed antibiotics.  .  Patient is concerned she may have pneumonia or the nodule on her lung changed.  Denies SOB, fevers, Chest pain when not coughing or any other symptoms at this time.    Patient requesting cough medicine be sent to her pharmacy.  Please review and call the patient back.  Can the doctor send the cough medicine  vs appt in office vs chest xray ?

## 2024-05-24 NOTE — TELEPHONE ENCOUNTER
Patient is currently waiting at [facility] who they did not receive the script for promethazine-dextromethorphan.

## 2024-05-28 ENCOUNTER — NURSE TRIAGE (OUTPATIENT)
Age: 54
End: 2024-05-28

## 2024-05-28 DIAGNOSIS — J06.9 ACUTE URI: ICD-10-CM

## 2024-05-28 RX ORDER — DEXTROMETHORPHAN HYDROBROMIDE AND PROMETHAZINE HYDROCHLORIDE 15; 6.25 MG/5ML; MG/5ML
5 SYRUP ORAL EVERY 4 HOURS PRN
Qty: 180 ML | Refills: 0 | Status: SHIPPED | OUTPATIENT
Start: 2024-05-28 | End: 2024-05-28

## 2024-05-28 RX ORDER — CODEINE PHOSPHATE/GUAIFENESIN 10-100MG/5
5 LIQUID (ML) ORAL 2 TIMES DAILY PRN
Qty: 75 ML | Refills: 0 | Status: SHIPPED | OUTPATIENT
Start: 2024-05-28 | End: 2024-05-30

## 2024-05-28 RX ORDER — ALBUTEROL SULFATE 90 UG/1
2 AEROSOL, METERED RESPIRATORY (INHALATION) EVERY 6 HOURS PRN
Qty: 18 G | Refills: 0 | Status: SHIPPED | OUTPATIENT
Start: 2024-05-28 | End: 2024-05-31 | Stop reason: SDUPTHER

## 2024-05-28 NOTE — TELEPHONE ENCOUNTER
Patient calls in stating she was seen on 5 22 2024 for start of upper respiratory infection.  She started antibiotics , prednisone and cough medicine.  Patient states the cough became relentless and she cannot tolerate the cough anymore.  Patient complains of soreness in ribs and chest from the excessive coughing.  Cough is dry and nonproductive. States she is having intermittent wheezing and frequent coughing fits/spells.  The cough medicine prescribed is gone. Patient needed it every 4 hours.  States it helped minimally but gave her some relief for an hour or two. Continues to take prednisone and antibiotic as prescribed.    Denies fevers, SOB, chest pain at this time.      Per protocol patient should be evaluated in the office today.  No appointments available in the office today. Patient requesting medication for the cough be sent to the pharmacy.      Please review and call the patient back with further recommendations.  Appointment vs sending cough medication etc..

## 2024-05-28 NOTE — TELEPHONE ENCOUNTER
"Reason for Disposition  • Continuous (nonstop) coughing interferes with work or school and no improvement using cough treatment per Care Advice    Answer Assessment - Initial Assessment Questions  1. ONSET: \"When did the cough begin?\"           One week ago dry cough that will not let up  chest and ribs hurt from cough    Cough Medicine  is not working     Denies fevers SOB chest pain states only during cough         2. SEVERITY: \"How bad is the cough today?\"           Severe       3. SPUTUM: \"Describe the color of your sputum\" (none, dry cough; clear, white, yellow, green)          Very mild      4. HEMOPTYSIS: \"Are you coughing up any blood?\" If so ask: \"How much?\" (flecks, streaks, tablespoons, etc.)          Denies       5. DIFFICULTY BREATHING: \"Are you having difficulty breathing?\" If Yes, ask: \"How bad is it?\" (e.g., mild, moderate, severe)     - MILD: No SOB at rest, mild SOB with walking, speaks normally in sentences, can lay down, no retractions, pulse < 100.     - MODERATE: SOB at rest, SOB with minimal exertion and prefers to sit, cannot lie down flat, speaks in phrases, mild retractions, audible wheezing, pulse 100-120.     - SEVERE: Very SOB at rest, speaks in single words, struggling to breathe, sitting hunched forward, retractions, pulse > 120         Denies        6. FEVER: \"Do you have a fever?\" If Yes, ask: \"What is your temperature, how was it measured, and when did it start?\"              7. CARDIAC HISTORY: \"Do you have any history of heart disease?\" (e.g., heart attack, congestive heart failure)         Denies        8. LUNG HISTORY: \"Do you have any history of lung disease?\"  (e.g., pulmonary embolus, asthma, emphysema)        Denies        9. PE RISK FACTORS: \"Do you have a history of blood clots?\" (or: recent major surgery, recent prolonged travel, bedridden)          Denies        10. OTHER SYMPTOMS: \"Do you have any other symptoms?\" (e.g., runny nose, wheezing, chest pain)        " "    Intermittent wheezing         12. TRAVEL: \"Have you traveled out of the country in the last month?\" (e.g., travel history, exposures)            Yes recently traveled to [place] and just returned    Protocols used: Cough-ADULT-[place]    "

## 2024-05-28 NOTE — TELEPHONE ENCOUNTER
Patient called in again and stated that she called in  this morning and spoke to someone whom she told the Cough Medication wasn't working at all and requested a stronger Cough Medication, she stated the pharmacist just called her to tell her the same cough medicine was prescribed again, PATIENT VERY UPSET WANTS SOMETHING SAURAV AND WANTS A CALL BACK STATED THAT NO ONE HAS CALLED HER BACK.    Please advise...

## 2024-05-28 NOTE — TELEPHONE ENCOUNTER
I sent guaifenesin with codeine which she can take twice a day as needed and Do not drive and operate any machinery after taking cough medication.  Please inform patient that do not take alprazolam or diazepam or any other narcotic while taking this medication as risk of respiratory distress. DALLAS Yadav

## 2024-05-28 NOTE — TELEPHONE ENCOUNTER
Regarding: SOB, cough causing rib pain  ----- Message from Daja MEDELLIN sent at 5/28/2024 11:00 AM EDT -----  Patient was seen for URI and chest x-ray came back acute.  She stated she finished antibiotics, prednisone and cough medicine.  She is still having this intense dry choking cough causing her ribs to be in so much pain she needs to bind herself.  She also stated the coughing is so bad she is running out of breath.  Please triage for further eval

## 2024-05-30 RX ORDER — HYDROCODONE POLISTIREX AND CHLORPHENIRAMINE POLISTIREX 10; 8 MG/5ML; MG/5ML
5 SUSPENSION, EXTENDED RELEASE ORAL EVERY 12 HOURS PRN
Qty: 75 ML | Refills: 0 | Status: SHIPPED | OUTPATIENT
Start: 2024-05-30

## 2024-05-30 NOTE — TELEPHONE ENCOUNTER
I just sent one cough medication with codeine on 5/28 and as per patient that was back order and I sent today new one and if that one is back order then those are the 2 kinds we have with codeine only. They can check other pharmacies and check if somebody else has it and let us know and we can send to that pharmacy. Also if he cough is that bad and she has already taken steroids and antibiotic and xray was ok, she should go to ER for evaluation so nothing is being missed. DALLAS Yadav

## 2024-05-30 NOTE — TELEPHONE ENCOUNTER
Spoke with patient and gave them providers message.  Appt given with Dr. Tompkins 05/31  Tomeka Mae CMA

## 2024-05-30 NOTE — TELEPHONE ENCOUNTER
"Pt's joan called states \"Marci is coughing her lungs out; is in a lot of pain, and her ribs are hurting from so much coughing; has lost her voice that's why he's calling for her and is having a hard time breathing\". I mentioned she should go to ER and he stated she has no one take her since he's at work in [place]\".    He states the med that was prescribed on 5/28 is on back order and pharmacy reached out to request an alternative, but no has responded.    Please advise; states to call Marci since he's not listed on her communication form.  "

## 2024-05-30 NOTE — TELEPHONE ENCOUNTER
Patients spouse called because she is unable to talk due to excessive coughing, he said the the cough medicine that was sent to the pharmacy they do not have it in stock and it is on back order. He would like the doctor to send a prescription for a stronger cough suppressant because all of the coughing is making her vomit.

## 2024-05-30 NOTE — TELEPHONE ENCOUNTER
New cough medication sent. Do not drive and operate any machinery after taking cough medication.  Please inform patient that do not take alprazolam or diazepam or any other narcotic while taking this medication as risk of respiratory distress. DALLAS Yadav

## 2024-05-31 ENCOUNTER — OFFICE VISIT (OUTPATIENT)
Dept: FAMILY MEDICINE CLINIC | Facility: CLINIC | Age: 54
End: 2024-05-31
Payer: COMMERCIAL

## 2024-05-31 VITALS
HEART RATE: 108 BPM | TEMPERATURE: 102 F | DIASTOLIC BLOOD PRESSURE: 80 MMHG | OXYGEN SATURATION: 93 % | SYSTOLIC BLOOD PRESSURE: 122 MMHG | HEIGHT: 65 IN | WEIGHT: 203.6 LBS | RESPIRATION RATE: 20 BRPM | BODY MASS INDEX: 33.92 KG/M2

## 2024-05-31 DIAGNOSIS — J06.9 ACUTE URI: ICD-10-CM

## 2024-05-31 DIAGNOSIS — R05.1 ACUTE COUGH: Primary | ICD-10-CM

## 2024-05-31 PROCEDURE — 94640 AIRWAY INHALATION TREATMENT: CPT

## 2024-05-31 PROCEDURE — 99213 OFFICE O/P EST LOW 20 MIN: CPT | Performed by: FAMILY MEDICINE

## 2024-05-31 RX ORDER — DOXYCYCLINE HYCLATE 100 MG
100 TABLET ORAL 2 TIMES DAILY
Qty: 20 TABLET | Refills: 0 | Status: SHIPPED | OUTPATIENT
Start: 2024-05-31 | End: 2024-06-10

## 2024-05-31 RX ORDER — ALBUTEROL SULFATE 90 UG/1
2 AEROSOL, METERED RESPIRATORY (INHALATION) EVERY 4 HOURS PRN
Qty: 8 G | Refills: 0 | Status: SHIPPED | OUTPATIENT
Start: 2024-05-31

## 2024-05-31 RX ORDER — ALBUTEROL SULFATE 2.5 MG/3ML
2.5 SOLUTION RESPIRATORY (INHALATION) ONCE
Status: COMPLETED | OUTPATIENT
Start: 2024-05-31 | End: 2024-05-31

## 2024-05-31 RX ADMIN — ALBUTEROL SULFATE 2.5 MG: 2.5 SOLUTION RESPIRATORY (INHALATION) at 12:02

## 2024-05-31 NOTE — PROGRESS NOTES
"Ambulatory Visit  Name: Marci Lopez      : 1970      MRN: [id number]  Encounter Provider: Farhana Tompkins DO  Encounter Date: 2024   Encounter department: [facility]    Assessment & Plan   1. Acute cough  Comments:  She has significant wheezing on exam.  She did improve after her albuterol treatment in the office.  Will have her discontinue amoxicillin and switch to doxycycline  Also started on albuterol inhaler.  If she worsens over the weekend will go to the emergency room for further evaluation.  Orders:  -     doxycycline hyclate (VIBRA-TABS) 100 mg tablet; Take 1 tablet (100 mg total) by mouth 2 (two) times a day for 10 days  -     albuterol inhalation solution 2.5 mg  -     CT chest wo contrast; Future; Expected date: 2024  2. Acute URI  -     albuterol (ProAir HFA) 90 mcg/act inhaler; Inhale 2 puffs every 4 (four) hours as needed for wheezing   Return if symptoms worsen or fail to improve.  History of Present Illness     She was evaluated for bronchitis on May 22 and started on amoxicillin.  She was started on Medrol Dosepak on May 24    She is coughing and is having significant wheezing.  She has pain in her ribs that she attributes to her severe coughing spells.  She is very uncomfortable.  The prescription cough medications have not relieved her symptoms.        Review of Systems    Objective     /80   Pulse (!) 108   Temp (!) 102 °F (38.9 °C) (Tympanic)   Resp 20   Ht 5' 5\" (1.651 m)   Wt 92.4 kg (203 lb 9.6 oz)   SpO2 93%   BMI 33.88 kg/m²     Physical Exam  Vitals and nursing note reviewed.   Constitutional:       General: She is not in acute distress.     Appearance: She is well-developed.   HENT:      Head: Normocephalic and atraumatic.      Right Ear: Tympanic membrane normal.      Left Ear: Tympanic membrane normal.   Cardiovascular:      Rate and Rhythm: Normal rate and regular rhythm.      Heart sounds: No murmur heard.  Pulmonary:      Effort: " Pulmonary effort is normal. No respiratory distress.      Breath sounds: Examination of the right-upper field reveals wheezing. Examination of the left-upper field reveals wheezing. Examination of the right-lower field reveals wheezing. Examination of the left-lower field reveals wheezing. Wheezing present.   Musculoskeletal:         General: No swelling.      Cervical back: Neck supple.   Neurological:      Mental Status: She is alert.       Administrative Statements

## 2024-06-06 RX ORDER — HYDROCODONE POLISTIREX AND CHLORPHENIRAMINE POLISTIREX 10; 8 MG/5ML; MG/5ML
5 SUSPENSION, EXTENDED RELEASE ORAL EVERY 12 HOURS PRN
Qty: 75 ML | Refills: 0 | Status: SHIPPED | OUTPATIENT
Start: 2024-06-06 | End: 2024-06-07 | Stop reason: SDUPTHER

## 2024-06-06 NOTE — TELEPHONE ENCOUNTER
Pt called in stating she will be done with cough medicine tomorrow and she's still having problems breathing, her chest is cracking, she's coughing a lot, coughing up gray mucus and she has fevers on and off. She's extremely tired from all the coughing and would like more cough medicine. She has 3 more days of the nebulizer meds. She says she was given a referral for a ct scan but hasn't heard from anyone regarding insurance approval. She's requesting a call back.

## 2024-06-06 NOTE — TELEPHONE ENCOUNTER
Please call patient  I sent in more cough medication for her.    She needs to call for the CT scan and schedule it. They can't do the authorization until it is scheduled.  They need to  have the date and the facility where she is going to complete the request.  Farhana Tompkins, DO

## 2024-06-07 ENCOUNTER — NURSE TRIAGE (OUTPATIENT)
Dept: OTHER | Facility: OTHER | Age: 54
End: 2024-06-07

## 2024-06-07 DIAGNOSIS — J06.9 ACUTE URI: ICD-10-CM

## 2024-06-07 RX ORDER — HYDROCODONE POLISTIREX AND CHLORPHENIRAMINE POLISTIREX 10; 8 MG/5ML; MG/5ML
5 SUSPENSION, EXTENDED RELEASE ORAL EVERY 12 HOURS PRN
Qty: 75 ML | Refills: 0 | Status: SHIPPED | OUTPATIENT
Start: 2024-06-07

## 2024-06-07 NOTE — TELEPHONE ENCOUNTER
Refill done    E-Prescribing Status: Receipt confirmed by pharmacy (6/7/2024  4:48 PM EDT)     Farhana Tompkins DO

## 2024-06-07 NOTE — TELEPHONE ENCOUNTER
"Answer Assessment - Initial Assessment Questions  1. DRUG NAME: \"What medicine do you need to have refilled?\"      Refill of cough suspension not at pharmacy    Protocols used: Medication Refill and Renewal Call-ADULT-      Provider messaged to authorize refill and action completed.  Pt made aware.  "

## 2024-06-07 NOTE — TELEPHONE ENCOUNTER
"Regarding: Prescription problem  ----- Message from Effie GARRETT sent at 6/7/2024  4:31 PM EDT -----  \" Dr. Tompkins was supposed to send prescriptions for me and I was told by the pharmacy they don't have it.\"    "

## 2024-06-10 ENCOUNTER — NURSE TRIAGE (OUTPATIENT)
Age: 54
End: 2024-06-10

## 2024-06-10 NOTE — TELEPHONE ENCOUNTER
I need to see her to change the order for the CT scan  Please schedule tomorrow for re-evaluation  Thank you,  Farhana Tompkins, DO

## 2024-06-10 NOTE — TELEPHONE ENCOUNTER
"Please advise - warm transfer to clinical to assist with appointment or further advisement from Dr Tompkins.advise to send triage task to clinical .   Patient calling c/o persistent cough and SOB. Finished ABT. Wishes a f/u appointment or treatment by Dr Tompkins. Declines seeing anyone else. CT scan is scheduled for Friday . Asking for it to be changed to STAT. Recent flying to and from [place] mentioned.    Reason for Disposition   SEVERE coughing spells (e.g., whooping sound after coughing, vomiting after coughing)    Answer Assessment - Initial Assessment Questions  1. ONSET: \"When did the cough begin?\"       Since memorial day   2. SEVERITY: \"How bad is the cough today?\"       Severe   3. SPUTUM: \"Describe the color of your sputum\" (none, dry cough; clear, white, yellow, green)      green  4. HEMOPTYSIS: \"Are you coughing up any blood?\" If so ask: \"How much?\" (flecks, streaks, tablespoons, etc.)      Blood   5. DIFFICULTY BREATHING: \"Are you having difficulty breathing?\" If Yes, ask: \"How bad is it?\" (e.g., mild, moderate, severe)     - MILD: No SOB at rest, mild SOB with walking, speaks normally in sentences, can lay down, no retractions, pulse < 100.     - MODERATE: SOB at rest, SOB with minimal exertion and prefers to sit, cannot lie down flat, speaks in phrases, mild retractions, audible wheezing, pulse 100-120.     - SEVERE: Very SOB at rest, speaks in single words, struggling to breathe, sitting hunched forward, retractions, pulse > 120       Moderate   6. FEVER: \"Do you have a fever?\" If Yes, ask: \"What is your temperature, how was it measured, and when did it start?\"      no  7. CARDIAC HISTORY: \"Do you have any history of heart disease?\" (e.g., heart attack, congestive heart failure)       HTN  8. LUNG HISTORY: \"Do you have any history of lung disease?\"  (e.g., pulmonary embolus, asthma, emphysema)      Denies , quit smoking last year  9. PE RISK FACTORS: \"Do you have a history of blood clots?\" (or: recent " "major surgery, recent prolonged travel, bedridden)      Recent travel from [place]   10. OTHER SYMPTOMS: \"Do you have any other symptoms?\" (e.g., runny nose, wheezing, chest pain)        coughing  11. PREGNANCY: \"Is there any chance you are pregnant?\" \"When was your last menstrual period?\"        N/a  12. TRAVEL: \"Have you traveled out of the country in the last month?\" (e.g., travel history, exposures)        N/a    Protocols used: Cough-ADULT-[place]    "

## 2024-06-11 ENCOUNTER — APPOINTMENT (EMERGENCY)
Dept: CT IMAGING | Facility: HOSPITAL | Age: 54
DRG: 152 | End: 2024-06-11
Payer: COMMERCIAL

## 2024-06-11 ENCOUNTER — APPOINTMENT (EMERGENCY)
Dept: RADIOLOGY | Facility: HOSPITAL | Age: 54
DRG: 152 | End: 2024-06-11
Payer: COMMERCIAL

## 2024-06-11 ENCOUNTER — HOSPITAL ENCOUNTER (INPATIENT)
Facility: HOSPITAL | Age: 54
LOS: 6 days | Discharge: HOME | DRG: 152 | End: 2024-06-17
Attending: EMERGENCY MEDICINE | Admitting: HOSPITALIST
Payer: COMMERCIAL

## 2024-06-11 ENCOUNTER — OFFICE VISIT (OUTPATIENT)
Dept: FAMILY MEDICINE CLINIC | Facility: CLINIC | Age: 54
End: 2024-06-11
Payer: COMMERCIAL

## 2024-06-11 VITALS
WEIGHT: 193 LBS | TEMPERATURE: 97.2 F | DIASTOLIC BLOOD PRESSURE: 88 MMHG | OXYGEN SATURATION: 96 % | RESPIRATION RATE: 24 BRPM | HEIGHT: 65 IN | HEART RATE: 108 BPM | SYSTOLIC BLOOD PRESSURE: 138 MMHG | BODY MASS INDEX: 32.15 KG/M2

## 2024-06-11 DIAGNOSIS — R06.02 SHORTNESS OF BREATH: Primary | ICD-10-CM

## 2024-06-11 DIAGNOSIS — J98.01 BRONCHOSPASM: ICD-10-CM

## 2024-06-11 DIAGNOSIS — R06.02 SHORTNESS OF BREATH: ICD-10-CM

## 2024-06-11 DIAGNOSIS — R09.02 HYPOXIA: ICD-10-CM

## 2024-06-11 DIAGNOSIS — J18.9 PULMONARY INFECTION: Primary | ICD-10-CM

## 2024-06-11 PROBLEM — R65.10 SIRS (SYSTEMIC INFLAMMATORY RESPONSE SYNDROME) (HCC): Status: ACTIVE | Noted: 2024-06-11

## 2024-06-11 PROBLEM — R19.7 DIARRHEA: Status: ACTIVE | Noted: 2024-06-11

## 2024-06-11 LAB
ALBUMIN SERPL BCP-MCNC: 4.1 G/DL (ref 3.5–5)
ALP SERPL-CCNC: 85 U/L (ref 34–104)
ALT SERPL W P-5'-P-CCNC: 18 U/L (ref 7–52)
ANION GAP SERPL CALCULATED.3IONS-SCNC: 10 MMOL/L (ref 4–13)
APTT PPP: 31 SECONDS (ref 23–37)
AST SERPL W P-5'-P-CCNC: 18 U/L (ref 13–39)
BASOPHILS # BLD AUTO: 0.13 THOUSANDS/ÂΜL (ref 0–0.1)
BASOPHILS NFR BLD AUTO: 1 % (ref 0–1)
BILIRUB SERPL-MCNC: 0.67 MG/DL (ref 0.2–1)
BNP SERPL-MCNC: 13 PG/ML (ref 0–100)
BUN SERPL-MCNC: 17 MG/DL (ref 5–25)
CALCIUM SERPL-MCNC: 9.4 MG/DL (ref 8.4–10.2)
CARDIAC TROPONIN I PNL SERPL HS: 4 NG/L
CHLORIDE SERPL-SCNC: 106 MMOL/L (ref 96–108)
CO2 SERPL-SCNC: 25 MMOL/L (ref 21–32)
CREAT SERPL-MCNC: 0.78 MG/DL (ref 0.6–1.3)
EOSINOPHIL # BLD AUTO: 0.81 THOUSAND/ÂΜL (ref 0–0.61)
EOSINOPHIL NFR BLD AUTO: 6 % (ref 0–6)
ERYTHROCYTE [DISTWIDTH] IN BLOOD BY AUTOMATED COUNT: 14.2 % (ref 11.6–15.1)
GFR SERPL CREATININE-BSD FRML MDRD: 85 ML/MIN/1.73SQ M
GLUCOSE SERPL-MCNC: 129 MG/DL (ref 65–140)
HCT VFR BLD AUTO: 41.7 % (ref 34.8–46.1)
HGB BLD-MCNC: 13.2 G/DL (ref 11.5–15.4)
IMM GRANULOCYTES # BLD AUTO: 0.08 THOUSAND/UL (ref 0–0.2)
IMM GRANULOCYTES NFR BLD AUTO: 1 % (ref 0–2)
INR PPP: 1.02 (ref 0.84–1.19)
L PNEUMO1 AG UR QL IA.RAPID: NEGATIVE
LACTATE SERPL-SCNC: 2.2 MMOL/L (ref 0.5–2)
LACTATE SERPL-SCNC: 2.5 MMOL/L (ref 0.5–2)
LYMPHOCYTES # BLD AUTO: 4.87 THOUSANDS/ÂΜL (ref 0.6–4.47)
LYMPHOCYTES NFR BLD AUTO: 37 % (ref 14–44)
MCH RBC QN AUTO: 27 PG (ref 26.8–34.3)
MCHC RBC AUTO-ENTMCNC: 31.7 G/DL (ref 31.4–37.4)
MCV RBC AUTO: 85 FL (ref 82–98)
MONOCYTES # BLD AUTO: 0.8 THOUSAND/ÂΜL (ref 0.17–1.22)
MONOCYTES NFR BLD AUTO: 6 % (ref 4–12)
NEUTROPHILS # BLD AUTO: 6.36 THOUSANDS/ÂΜL (ref 1.85–7.62)
NEUTS SEG NFR BLD AUTO: 49 % (ref 43–75)
NRBC BLD AUTO-RTO: 0 /100 WBCS
PLATELET # BLD AUTO: 521 THOUSANDS/UL (ref 149–390)
PMV BLD AUTO: 10.1 FL (ref 8.9–12.7)
POTASSIUM SERPL-SCNC: 3.5 MMOL/L (ref 3.5–5.3)
PROCALCITONIN SERPL-MCNC: <0.05 NG/ML
PROT SERPL-MCNC: 8 G/DL (ref 6.4–8.4)
PROTHROMBIN TIME: 14 SECONDS (ref 11.6–14.5)
RBC # BLD AUTO: 4.89 MILLION/UL (ref 3.81–5.12)
S PNEUM AG UR QL: NEGATIVE
SODIUM SERPL-SCNC: 141 MMOL/L (ref 135–147)
WBC # BLD AUTO: 13.05 THOUSAND/UL (ref 4.31–10.16)

## 2024-06-11 PROCEDURE — 96361 HYDRATE IV INFUSION ADD-ON: CPT

## 2024-06-11 PROCEDURE — 85025 COMPLETE CBC W/AUTO DIFF WBC: CPT | Performed by: EMERGENCY MEDICINE

## 2024-06-11 PROCEDURE — 87040 BLOOD CULTURE FOR BACTERIA: CPT | Performed by: EMERGENCY MEDICINE

## 2024-06-11 PROCEDURE — 71045 X-RAY EXAM CHEST 1 VIEW: CPT

## 2024-06-11 PROCEDURE — 99285 EMERGENCY DEPT VISIT HI MDM: CPT

## 2024-06-11 PROCEDURE — 96372 THER/PROPH/DIAG INJ SC/IM: CPT

## 2024-06-11 PROCEDURE — 99223 1ST HOSP IP/OBS HIGH 75: CPT | Performed by: INTERNAL MEDICINE

## 2024-06-11 PROCEDURE — 94640 AIRWAY INHALATION TREATMENT: CPT

## 2024-06-11 PROCEDURE — 0202U NFCT DS 22 TRGT SARS-COV-2: CPT

## 2024-06-11 PROCEDURE — 87070 CULTURE OTHR SPECIMN AEROBIC: CPT

## 2024-06-11 PROCEDURE — 94644 CONT INHLJ TX 1ST HOUR: CPT

## 2024-06-11 PROCEDURE — 84145 PROCALCITONIN (PCT): CPT | Performed by: EMERGENCY MEDICINE

## 2024-06-11 PROCEDURE — 87449 NOS EACH ORGANISM AG IA: CPT

## 2024-06-11 PROCEDURE — 96374 THER/PROPH/DIAG INJ IV PUSH: CPT

## 2024-06-11 PROCEDURE — 87205 SMEAR GRAM STAIN: CPT

## 2024-06-11 PROCEDURE — 85730 THROMBOPLASTIN TIME PARTIAL: CPT | Performed by: EMERGENCY MEDICINE

## 2024-06-11 PROCEDURE — 84484 ASSAY OF TROPONIN QUANT: CPT | Performed by: EMERGENCY MEDICINE

## 2024-06-11 PROCEDURE — 99215 OFFICE O/P EST HI 40 MIN: CPT | Performed by: FAMILY MEDICINE

## 2024-06-11 PROCEDURE — 71275 CT ANGIOGRAPHY CHEST: CPT

## 2024-06-11 PROCEDURE — 94760 N-INVAS EAR/PLS OXIMETRY 1: CPT

## 2024-06-11 PROCEDURE — 99291 CRITICAL CARE FIRST HOUR: CPT | Performed by: EMERGENCY MEDICINE

## 2024-06-11 PROCEDURE — 36415 COLL VENOUS BLD VENIPUNCTURE: CPT | Performed by: EMERGENCY MEDICINE

## 2024-06-11 PROCEDURE — 93005 ELECTROCARDIOGRAM TRACING: CPT

## 2024-06-11 PROCEDURE — 80053 COMPREHEN METABOLIC PANEL: CPT | Performed by: EMERGENCY MEDICINE

## 2024-06-11 PROCEDURE — 85610 PROTHROMBIN TIME: CPT | Performed by: EMERGENCY MEDICINE

## 2024-06-11 PROCEDURE — 83880 ASSAY OF NATRIURETIC PEPTIDE: CPT | Performed by: EMERGENCY MEDICINE

## 2024-06-11 PROCEDURE — 96375 TX/PRO/DX INJ NEW DRUG ADDON: CPT

## 2024-06-11 PROCEDURE — 83605 ASSAY OF LACTIC ACID: CPT | Performed by: EMERGENCY MEDICINE

## 2024-06-11 RX ORDER — GUAIFENESIN/DEXTROMETHORPHAN 100-10MG/5
10 SYRUP ORAL EVERY 6 HOURS PRN
Status: DISCONTINUED | OUTPATIENT
Start: 2024-06-11 | End: 2024-06-12

## 2024-06-11 RX ORDER — GUAIFENESIN 600 MG/1
600 TABLET, EXTENDED RELEASE ORAL EVERY 12 HOURS SCHEDULED
Status: DISCONTINUED | OUTPATIENT
Start: 2024-06-11 | End: 2024-06-13

## 2024-06-11 RX ORDER — METOPROLOL SUCCINATE 50 MG/1
50 TABLET, EXTENDED RELEASE ORAL DAILY
Status: DISCONTINUED | OUTPATIENT
Start: 2024-06-12 | End: 2024-06-17 | Stop reason: HOSPADM

## 2024-06-11 RX ORDER — KETOROLAC TROMETHAMINE 30 MG/ML
10 INJECTION, SOLUTION INTRAMUSCULAR; INTRAVENOUS ONCE
Status: COMPLETED | OUTPATIENT
Start: 2024-06-11 | End: 2024-06-11

## 2024-06-11 RX ORDER — CALCIUM CARBONATE 500 MG/1
500 TABLET, CHEWABLE ORAL DAILY PRN
Status: DISCONTINUED | OUTPATIENT
Start: 2024-06-11 | End: 2024-06-17 | Stop reason: HOSPADM

## 2024-06-11 RX ORDER — ALBUTEROL SULFATE 2.5 MG/3ML
2.5 SOLUTION RESPIRATORY (INHALATION) ONCE
Status: COMPLETED | OUTPATIENT
Start: 2024-06-11 | End: 2024-06-11

## 2024-06-11 RX ORDER — METHOCARBAMOL 500 MG/1
500 TABLET, FILM COATED ORAL EVERY 6 HOURS PRN
Status: DISCONTINUED | OUTPATIENT
Start: 2024-06-11 | End: 2024-06-13

## 2024-06-11 RX ORDER — ALPRAZOLAM 0.25 MG/1
0.25 TABLET ORAL
Status: DISCONTINUED | OUTPATIENT
Start: 2024-06-11 | End: 2024-06-13

## 2024-06-11 RX ORDER — DIAZEPAM 5 MG/1
5 TABLET ORAL
Status: DISCONTINUED | OUTPATIENT
Start: 2024-06-11 | End: 2024-06-11

## 2024-06-11 RX ORDER — ACETAMINOPHEN 325 MG/1
975 TABLET ORAL EVERY 6 HOURS PRN
Status: DISCONTINUED | OUTPATIENT
Start: 2024-06-11 | End: 2024-06-12

## 2024-06-11 RX ORDER — AMLODIPINE BESYLATE 10 MG/1
10 TABLET ORAL DAILY
Status: DISCONTINUED | OUTPATIENT
Start: 2024-06-12 | End: 2024-06-17 | Stop reason: HOSPADM

## 2024-06-11 RX ORDER — ENOXAPARIN SODIUM 100 MG/ML
40 INJECTION SUBCUTANEOUS DAILY
Status: DISCONTINUED | OUTPATIENT
Start: 2024-06-12 | End: 2024-06-16

## 2024-06-11 RX ORDER — CLOBETASOL PROPIONATE 0.5 MG/G
CREAM TOPICAL 2 TIMES DAILY
Status: DISCONTINUED | OUTPATIENT
Start: 2024-06-11 | End: 2024-06-17 | Stop reason: HOSPADM

## 2024-06-11 RX ORDER — ALBUTEROL SULFATE 90 UG/1
2 AEROSOL, METERED RESPIRATORY (INHALATION) EVERY 4 HOURS PRN
Status: DISCONTINUED | OUTPATIENT
Start: 2024-06-11 | End: 2024-06-17 | Stop reason: HOSPADM

## 2024-06-11 RX ORDER — ACETAMINOPHEN 325 MG/1
975 TABLET ORAL ONCE
Status: COMPLETED | OUTPATIENT
Start: 2024-06-11 | End: 2024-06-11

## 2024-06-11 RX ORDER — ALBUTEROL SULFATE 2.5 MG/3ML
2.5 SOLUTION RESPIRATORY (INHALATION) ONCE
Status: DISCONTINUED | OUTPATIENT
Start: 2024-06-11 | End: 2024-06-11

## 2024-06-11 RX ORDER — SODIUM CHLORIDE FOR INHALATION 0.9 %
12 VIAL, NEBULIZER (ML) INHALATION ONCE
Status: COMPLETED | OUTPATIENT
Start: 2024-06-11 | End: 2024-06-11

## 2024-06-11 RX ORDER — CYCLOBENZAPRINE HCL 10 MG
10 TABLET ORAL
Status: CANCELLED | OUTPATIENT
Start: 2024-06-11

## 2024-06-11 RX ADMIN — ACETAMINOPHEN 975 MG: 325 TABLET, FILM COATED ORAL at 21:29

## 2024-06-11 RX ADMIN — CLOBETASOL PROPIONATE: 0.5 CREAM TOPICAL at 18:40

## 2024-06-11 RX ADMIN — SODIUM CHLORIDE 500 ML: 0.9 INJECTION, SOLUTION INTRAVENOUS at 13:22

## 2024-06-11 RX ADMIN — AZITHROMYCIN MONOHYDRATE 500 MG: 500 INJECTION, POWDER, LYOPHILIZED, FOR SOLUTION INTRAVENOUS at 16:16

## 2024-06-11 RX ADMIN — GUAIFENESIN AND DEXTROMETHORPHAN 10 ML: 100; 10 SYRUP ORAL at 21:29

## 2024-06-11 RX ADMIN — ALBUTEROL SULFATE 2.5 MG: 2.5 SOLUTION RESPIRATORY (INHALATION) at 17:49

## 2024-06-11 RX ADMIN — ALPRAZOLAM 0.25 MG: 0.25 TABLET ORAL at 21:29

## 2024-06-11 RX ADMIN — METHOCARBAMOL TABLETS 500 MG: 500 TABLET, COATED ORAL at 21:29

## 2024-06-11 RX ADMIN — CEFTRIAXONE SODIUM 2000 MG: 10 INJECTION, POWDER, FOR SOLUTION INTRAVENOUS at 15:15

## 2024-06-11 RX ADMIN — GUAIFENESIN 600 MG: 600 TABLET ORAL at 19:43

## 2024-06-11 RX ADMIN — KETOROLAC TROMETHAMINE 9.9 MG: 30 INJECTION, SOLUTION INTRAMUSCULAR; INTRAVENOUS at 15:01

## 2024-06-11 RX ADMIN — ALBUTEROL SULFATE 10 MG: 2.5 SOLUTION RESPIRATORY (INHALATION) at 14:18

## 2024-06-11 RX ADMIN — ISODIUM CHLORIDE 12 ML: 0.03 SOLUTION RESPIRATORY (INHALATION) at 14:18

## 2024-06-11 RX ADMIN — IPRATROPIUM BROMIDE 1 MG: 0.5 SOLUTION RESPIRATORY (INHALATION) at 14:18

## 2024-06-11 RX ADMIN — ACETAMINOPHEN 975 MG: 325 TABLET, FILM COATED ORAL at 13:23

## 2024-06-11 RX ADMIN — IOHEXOL 85 ML: 350 INJECTION, SOLUTION INTRAVENOUS at 14:02

## 2024-06-11 RX ADMIN — ALBUTEROL SULFATE 2.5 MG: 2.5 SOLUTION RESPIRATORY (INHALATION) at 12:31

## 2024-06-11 NOTE — ASSESSMENT & PLAN NOTE
SIRS criteria with leukocytosis, tachypnea and tachycardia  Likely in setting of pneumonia    See plan under pneumonia

## 2024-06-11 NOTE — ED PROVIDER NOTES
History  Chief Complaint   Patient presents with    Shortness of Breath     Pt c/o increasing SOB since [place]. Received 2g mag, 0.25 terbutaline SQ, 62.5solumedrol, 4mg zofran, 3 albuterol and 1 duoneb PTA. Family dx with pneumonia.      Patient is a 54-year-old female presents to the emergency department with productive cough, recent fevers and dyspnea.  She traveled to [facility] from May 1 through 18.  A friend accompanying her had URI symptoms-nasal congestion/sinus congestion for the last few days of their trip and improved after rest and a few days of antibiotics.  On May 22 patient was seen at her physician's office with a sore throat.  Viral panel was negative and she was initiated on amoxicillin which she subsequently completed.  While on that she developed cough and fevers.  Cough was quite forceful.  She describes having black and blues on her chest as a result of the forceful nature of this.  She was initiated on a course of prednisone and additionally prescribed albuterol nebulized.  Cough not improving and she was initiated on doxycycline on May 31.  She completed this 2 days ago on Sunday.  Cough continues with productive nature.  Sputum varies between green and gray.  It has been very thick.  She has not appreciated any hemoptysis.  She appreciates generalized chest discomfort as well as generalized myalgias.  No sharp chest pain.  She is unable to lay flat due to degree of dyspnea and notes that last week she was unable to speak as a result of her shortness of breath.  She has not appreciated any swelling or cramping in her legs.  She had been running fevers last week although not within the last few days.  No nasal congestion.  Sore throat fully resolved.  Appetite has been very poor and she has unintentionally lost 10 pounds.  She notes that anytime she does eat or drink something she either vomits or experiences diarrhea afterwards.  No significant history of  pulmonary problems.  She has never before required bronchodilators.  Last year out of an abundance of caution she began undergoing screening for lung cancer.  Few very tiny nodules were appreciated in the left lung and per her report and on a follow-up slightly larger 1 for which she will have upcoming imaging this year.  Mother with history of coagulopathy/factor V Leiden deficiency.  Patient [place] for coagulopathies and did not have the same abnormality nor others identified.  Patient did have her antihypertensives today though no other medication.  She does describe at times it feels like her chest is closing in.  Medical history includes anxiety.  While traveling she did quit smoking cigarettes.  She relates that for the first time she did vape for 1 week.    PCP note reviewed.  Patient was seen in the office just prior to arrival.  She was unable to be weaned off of oxygen after a couple of nebulizer treatments and remained on 2.5 L.  Upon EMS arrival they note that she has received magnesium, Solu-Medrol as well as 3 albuterol nebulizer treatments.  She appreciates very slight improvement in breathing.        Prior to Admission Medications   Prescriptions Last Dose Informant Patient Reported? Taking?   ALPRAZolam (XANAX) 0.25 mg tablet Past Month Self Yes Yes   Sig: Take 0.25 mg by mouth daily at bedtime as needed for anxiety   albuterol (ProAir HFA) 90 mcg/act inhaler 6/11/2024  No Yes   Sig: Inhale 2 puffs every 4 (four) hours as needed for wheezing   amLODIPine (NORVASC) 10 mg tablet  Self Yes No   Sig: Take 10 mg by mouth daily   diazepam (VALIUM) 5 mg tablet More than a month Self Yes No   Sig: Take 5 mg by mouth daily at bedtime as needed   doxycycline hyclate (VIBRA-TABS) 100 mg tablet   No No   Sig: Take 1 tablet (100 mg total) by mouth 2 (two) times a day for 10 days   halobetasol (ULTRAVATE) 0.05 % ointment 6/11/2024 Self No Yes   Sig: Apply topically 2 (two) times a day To affected area    metoprolol succinate (TOPROL-XL) 50 mg 24 hr tablet 2024 Self Yes Yes   Sig: Take 50 mg by mouth daily      Facility-Administered Medications Last Administration Doses Remaining   albuterol inhalation solution 2.5 mg 2024 12:31 PM 0          Past Medical History:   Diagnosis Date    Allergic rhinitis     Anxiety     Colon polyp     Disease of thyroid gland     Endometriosis     Fibroid     Hypertension     Kidney stones     left side    Migraine     teenager       Past Surgical History:   Procedure Laterality Date    ABDOMINAL SURGERY  2023    tummy tuck hernia repair    ABDOMINOPLASTY      APPENDECTOMY      CHOLECYSTECTOMY      FOOT FRACTURE SURGERY      HERNIA REPAIR      umbilical x 3 last one 2023    KNEE CARTILAGE SURGERY Right     and ACL    LAPAROSCOPIC GASTRIC BANDING      removed    OVARIAN CYST SURGERY      SHOULDER SURGERY Right     x 3    STOMACH SURGERY  2023    tummy tuck       Family History   Problem Relation Age of Onset    Stroke Mother     Hypertension Mother     Diabetes Father     Coronary artery disease Father     Pancreatic cancer Father     Heart disease Father     Cancer Father         pancreatic    Hypertension Father     Diabetes Sister     Psoriasis Sister     Hypertension Sister     Breast cancer Maternal Grandmother 80    Breast cancer Maternal Aunt 58    Breast cancer Paternal Aunt         unkown age     I have reviewed and agree with the history as documented.    E-Cigarette/Vaping    E-Cigarette Use Never User      E-Cigarette/Vaping Substances    Nicotine No     THC No     CBD No     Flavoring No     Other No     Unknown No      Social History     Tobacco Use    Smoking status: Former     Current packs/day: 0.00     Average packs/day: 0.3 packs/day for 44.0 years (11.0 ttl pk-yrs)     Types: Cigarettes     Start date: 6/15/1984     Quit date: 2023     Years since quittin.1     Passive exposure: Past    Smokeless tobacco: Never   Vaping Use     Vaping status: Never Used   Substance Use Topics    Alcohol use: Not Currently    Drug use: Not Currently     Types: Marijuana     Comment: gummies to sleep for aniety and pain       Review of Systems   All other systems reviewed and are negative.      Physical Exam  Physical Exam  Vitals and nursing note reviewed.   Constitutional:       General: She is in acute distress.      Appearance: She is well-developed. She is ill-appearing.      Comments: Patient with tachypnea.  Frequent coarse sounding cough-minimally productive in my presence.   Eyes:      Extraocular Movements: Extraocular movements intact.   Cardiovascular:      Rate and Rhythm: Regular rhythm. Tachycardia present.   Pulmonary:      Effort: Tachypnea and accessory muscle usage present.      Breath sounds: Wheezing (Diffuse, coarse) present.   Musculoskeletal:      Cervical back: Normal range of motion.      Right lower leg: No tenderness. No edema.      Left lower leg: No tenderness. No edema.   Skin:     General: Skin is warm and dry.   Neurological:      Mental Status: She is alert.   Psychiatric:         Mood and Affect: Mood normal.         Behavior: Behavior normal.         Vital Signs  ED Triage Vitals   Temperature Pulse Respirations Blood Pressure SpO2   06/11/24 1312 06/11/24 1302 06/11/24 1302 06/11/24 1302 06/11/24 1302   98.2 °F (36.8 °C) (!) 108 (!) 24 149/97 99 %      Temp Source Heart Rate Source Patient Position - Orthostatic VS BP Location FiO2 (%)   06/11/24 1312 06/11/24 1302 06/11/24 1302 06/11/24 1302 --   Oral Monitor Sitting Right arm       Pain Score       06/11/24 1947       No Pain           Vitals:    06/16/24 2115 06/16/24 2244 06/17/24 0745 06/17/24 0745   BP:  111/66 116/86 116/86   Pulse: 83 90 87 88   Patient Position - Orthostatic VS:             Visual Acuity      ED Medications  Medications   sodium chloride 0.9 % bolus 500 mL (0 mL Intravenous Stopped 6/11/24 1422)   acetaminophen (TYLENOL) tablet 975 mg (975 mg  Oral Given 6/11/24 1323)   albuterol inhalation solution 10 mg (10 mg Nebulization Given 6/11/24 1418)   ipratropium (ATROVENT) 0.02 % inhalation solution 1 mg (1 mg Nebulization Given 6/11/24 1418)   sodium chloride 0.9 % inhalation solution 12 mL (12 mL Nebulization Given 6/11/24 1418)   iohexol (OMNIPAQUE) 350 MG/ML injection (MULTI-DOSE) 100 mL (85 mL Intravenous Given 6/11/24 1402)   ketorolac (TORADOL) injection 9.9 mg (9.9 mg Intravenous Given 6/11/24 1501)   ceftriaxone (ROCEPHIN) 2 g/50 mL in dextrose IVPB (0 mg Intravenous Stopped 6/11/24 1606)   azithromycin (ZITHROMAX) 500 mg in sodium chloride 0.9% 250mL IVPB 500 mg (0 mg Intravenous Stopped 6/11/24 1748)   albuterol inhalation solution 2.5 mg (2.5 mg Nebulization Given 6/11/24 1749)   guaiFENesin (MUCINEX) 12 hr tablet 600 mg (600 mg Oral Given 6/13/24 0438)   ALPRAZolam (XANAX) tablet 0.25 mg (0.25 mg Oral Given 6/13/24 0540)   Hydrocod Wes-Chlorphe Wes ER (TUSSIONEX) ER suspension 5 mL (5 mL Oral Given 6/16/24 0938)       Diagnostic Studies  Results Reviewed       Procedure Component Value Units Date/Time    Blood culture #1 [[id number]] Collected: 06/11/24 1304    Lab Status: Final result Specimen: Blood from Arm, Left Updated: 06/16/24 1601     Blood Culture No Growth After 5 Days.    Blood culture #2 [[id number]] Collected: 06/11/24 1325    Lab Status: Final result Specimen: Blood from Arm, Right Updated: 06/16/24 1601     Blood Culture No Growth After 5 Days.    Sputum culture and Gram stain [[id number]]  (Abnormal) Collected: 06/11/24 1840    Lab Status: Final result Specimen: Expectorated Sputum Updated: 06/13/24 1131     Sputum Culture 2+ Growth of     Gram Stain Result 1+ Epithelial cells per low power field      2+ Polys      1+ Gram positive cocci in pairs      1+ Gram positive rods    Procalcitonin [[id number]]  (Normal) Collected: 06/12/24 0512    Lab Status: Final result Specimen: Blood from Arm, Right Updated: 06/12/24 0617      Procalcitonin <0.05 ng/ml     Basic metabolic panel [[id number]]  (Abnormal) Collected: 06/12/24 0512    Lab Status: Final result Specimen: Blood from Arm, Right Updated: 06/12/24 0607     Sodium 137 mmol/L      Potassium 3.9 mmol/L      Chloride 106 mmol/L      CO2 24 mmol/L      ANION GAP 7 mmol/L      BUN 19 mg/dL      Creatinine 0.72 mg/dL      Glucose 155 mg/dL      Calcium 9.6 mg/dL      eGFR 94 ml/min/1.73sq m     Narrative:      National Kidney Disease Foundation guidelines for Chronic Kidney Disease (CKD):     Stage 1 with normal or high GFR (GFR > 90 mL/min/1.73 square meters)    Stage 2 Mild CKD (GFR = 60-89 mL/min/1.73 square meters)    Stage 3A Moderate CKD (GFR = 45-59 mL/min/1.73 square meters)    Stage 3B Moderate CKD (GFR = 30-44 mL/min/1.73 square meters)    Stage 4 Severe CKD (GFR = 15-29 mL/min/1.73 square meters)    Stage 5 End Stage CKD (GFR <15 mL/min/1.73 square meters)  Note: GFR calculation is accurate only with a steady state creatinine    CBC and Platelet [[id number]]  (Abnormal) Collected: 06/12/24 0512    Lab Status: Final result Specimen: Blood from Arm, Right Updated: 06/12/24 0552     WBC 13.41 Thousand/uL      RBC 4.38 Million/uL      Hemoglobin 11.9 g/dL      Hematocrit 37.0 %      MCV 85 fL      MCH 27.2 pg      MCHC 32.2 g/dL      RDW 14.3 %      Platelets 489 Thousands/uL      MPV 10.3 fL     Legionella antigen, urine [[id number]]  (Normal) Collected: 06/11/24 2124    Lab Status: Final result Specimen: Urine, Clean Catch Updated: 06/11/24 2350     Legionella Urinary Antigen Negative    Strep Pneumoniae, Urine [[id number]]  (Normal) Collected: 06/11/24 2124    Lab Status: Final result Specimen: Urine, Clean Catch Updated: 06/11/24 2349     Strep pneumoniae antigen, urine Negative    Lactic acid 2 Hours [[id number]]  (Abnormal) Collected: 06/11/24 1515    Lab Status: Final result Specimen: Blood from Arm, Left Updated: 06/11/24 1550     LACTIC ACID 2.2 mmol/L     Narrative:      Result  may be elevated if tourniquet was used during collection.    B-Type Natriuretic Peptide(BNP) [[id number]]  (Normal) Collected: 06/11/24 1304    Lab Status: Final result Specimen: Blood from Arm, Left Updated: 06/11/24 1407     BNP 13 pg/mL     Procalcitonin [[id number]]  (Normal) Collected: 06/11/24 1304    Lab Status: Final result Specimen: Blood from Arm, Left Updated: 06/11/24 1356     Procalcitonin <0.05 ng/ml     HS Troponin 0hr (reflex protocol) [[id number]]  (Normal) Collected: 06/11/24 1304    Lab Status: Final result Specimen: Blood from Arm, Left Updated: 06/11/24 1353     hs TnI 0hr 4 ng/L     Comprehensive metabolic panel [[id number]] Collected: 06/11/24 1304    Lab Status: Final result Specimen: Blood from Arm, Left Updated: 06/11/24 1348     Sodium 141 mmol/L      Potassium 3.5 mmol/L      Chloride 106 mmol/L      CO2 25 mmol/L      ANION GAP 10 mmol/L      BUN 17 mg/dL      Creatinine 0.78 mg/dL      Glucose 129 mg/dL      Calcium 9.4 mg/dL      AST 18 U/L      ALT 18 U/L      Alkaline Phosphatase 85 U/L      Total Protein 8.0 g/dL      Albumin 4.1 g/dL      Total Bilirubin 0.67 mg/dL      eGFR 85 ml/min/1.73sq m     Narrative:      National Kidney Disease Foundation guidelines for Chronic Kidney Disease (CKD):     Stage 1 with normal or high GFR (GFR > 90 mL/min/1.73 square meters)    Stage 2 Mild CKD (GFR = 60-89 mL/min/1.73 square meters)    Stage 3A Moderate CKD (GFR = 45-59 mL/min/1.73 square meters)    Stage 3B Moderate CKD (GFR = 30-44 mL/min/1.73 square meters)    Stage 4 Severe CKD (GFR = 15-29 mL/min/1.73 square meters)    Stage 5 End Stage CKD (GFR <15 mL/min/1.73 square meters)  Note: GFR calculation is accurate only with a steady state creatinine    Lactic acid [[id number]]  (Abnormal) Collected: 06/11/24 1304    Lab Status: Final result Specimen: Blood from Arm, Left Updated: 06/11/24 1348     LACTIC ACID 2.5 mmol/L     Narrative:      Result may be elevated if tourniquet was used during  collection.    Protime-INR [[id number]]  (Normal) Collected: 06/11/24 1304    Lab Status: Final result Specimen: Blood from Arm, Left Updated: 06/11/24 1336     Protime 14.0 seconds      INR 1.02    APTT [[id number]]  (Normal) Collected: 06/11/24 1304    Lab Status: Final result Specimen: Blood from Arm, Left Updated: 06/11/24 1336     PTT 31 seconds     CBC and differential [[id number]]  (Abnormal) Collected: 06/11/24 1304    Lab Status: Final result Specimen: Blood from Arm, Left Updated: 06/11/24 1328     WBC 13.05 Thousand/uL      RBC 4.89 Million/uL      Hemoglobin 13.2 g/dL      Hematocrit 41.7 %      MCV 85 fL      MCH 27.0 pg      MCHC 31.7 g/dL      RDW 14.2 %      MPV 10.1 fL      Platelets 521 Thousands/uL      nRBC 0 /100 WBCs      Segmented % 49 %      Immature Grans % 1 %      Lymphocytes % 37 %      Monocytes % 6 %      Eosinophils Relative 6 %      Basophils Relative 1 %      Absolute Neutrophils 6.36 Thousands/µL      Absolute Immature Grans 0.08 Thousand/uL      Absolute Lymphocytes 4.87 Thousands/µL      Absolute Monocytes 0.80 Thousand/µL      Eosinophils Absolute 0.81 Thousand/µL      Basophils Absolute 0.13 Thousands/µL                    CTA ED chest PE study   Final Result by El Garcia MD (06/11 [id number])      No pulmonary embolism.      Diffuse tree-in-bud nodularity. Most commonly seen in the setting of infection.      Small hiatal hernia.                        Workstation performed: [id number]         XR chest portable   ED Interpretation by Agueda Higgins MD (06/11 9691)   Diffuse groundglass opacities-most dense in the right lower lung field      Final Result by Varinder Chowdary MD (06/11 9555)      No acute cardiopulmonary disease.            Workstation performed: [id number]                    Procedures  ECG 12 Lead Documentation Only    Date/Time: 6/11/2024 1:24 PM    Performed by: Agueda Higgins MD  Authorized by: Agueda Higgins MD    ECG  reviewed by me, the ED Provider: yes    Patient location:  ED  Previous ECG:     Previous ECG:  Unavailable  Rate:     ECG rate:  106    ECG rate assessment: tachycardic    Rhythm:     Rhythm: sinus tachycardia    QRS:     QRS axis:  Normal  Conduction:     Conduction: normal    ST segments:     ST segments:  Abnormal    Depression:  II, III, aVF, V3, V4, V5 and V6  T waves:     T waves comment:  Biphasic inferiorly and laterally  CriticalCare Time    Date/Time: 6/11/2024 1:00 PM    Performed by: Agueda Higgins MD  Authorized by: Agueda Higgins MD    Critical care provider statement:     Critical care time (minutes):  30    Critical care time was exclusive of:  Separately billable procedures and treating other patients and teaching time    Critical care was necessary to treat or prevent imminent or life-threatening deterioration of the following conditions:  Respiratory failure    Critical care was time spent personally by me on the following activities:  Obtaining history from patient or surrogate, examination of patient, review of old charts, ordering and performing treatments and interventions, ordering and review of laboratory studies, ordering and review of radiographic studies, re-evaluation of patient's condition, evaluation of patient's response to treatment, development of treatment plan with patient or surrogate and discussions with consultants           ED Course  ED Course as of 06/17/24 2107 Tue Jun 11, 2024   1322 Differential diagnosis includes but is not limited to bacterial or viral pneumonia, severe bronchitis, pneumonitis-potentially related to recent vaping, PE, ACS, CHF/cardiomyopathy, pericarditis/myocarditis.    Concerning potentially for sepsis, dehydration, electrolyte abnormality.   1357 X-ray reviewed.  Concerning for diffuse groundglass opacities.  Question pulmonary fibrosis/interstitial lung disease.  CT pending.   1411 Troponin and BNP within normal  "limits.  ACS/cardiomyopathy very unlikely   1445 Patient has received a good amount of the nebulizer treatment.  She is reporting some improvement in breathing.  Still with diffuse inspiratory and expiratory wheezes-less coarse.  She remains very uncomfortable with air movement/accessory muscle use.  Will order NSAID.   1502 CT report has been rendered.  Diffuse tree-in-bud nodularity appreciated-suspicious for infection.   1514 Case reviewed with Dr. Vicente who accepted patient to his service.   1521 Patient updated on findings/ admission plan.  Nebulizer treatment still in progress.                            Initial Sepsis Screening       Row Name 06/11/24 1516                Is the patient's history suggestive of a new or worsening infection? Yes (Proceed)  -SZ        Suspected source of infection pneumonia  -SZ        Indicate SIRS criteria Tachycardia > 90 bpm;Tachypnea > 20 resp per min;Leukocytosis (WBC > 78676 IJL) OR Leukopenia (WBC <4000 IJL) OR Bandemia (WBC >10% bands)  -SZ        Are two or more of the above signs & symptoms of infection both present and new to the patient? Yes (Proceed)  -SZ        Assess for evidence of organ dysfunction: Are any of the below criteria present within 6 hours of suspected infection and SIRS criteria that are NOT considered to be chronic conditions? Lactate > 2.0  -SZ        Date of presentation of severe sepsis 06/11/24  -SZ        Time of presentation of severe sepsis 1517  -SZ        Sepsis Note: Click \"NEXT\" below (NOT \"close\") to generate sepsis note based on above information. --                  User Key  (r) = Recorded By, (t) = Taken By, (c) = Cosigned By      Initials Name Provider Type    ALBA Higgins MD Physician                        Heather Criteria for PE      Flowsheet Row Most Recent Value   Pj' Criteria for PE    Clinical signs and symptoms of DVT 0 Filed at: 06/11/2024 1315   PE is primary diagnosis or equally likely 3 Filed " at: 06/11/2024 1315   HR >100 1.5 Filed at: 06/11/2024 1315   Immobilization at least 3 days or Surgery in the previous 4 weeks 1.5 Filed at: 06/11/2024 1315   Previous, objectively diagnosed PE or DVT 0 Filed at: 06/11/2024 1315   Hemoptysis 0 Filed at: 06/11/2024 1315   Malignancy with treatment within 6 months or palliative 0 Filed at: 06/11/2024 1315   Wells' Criteria Total 6 Filed at: 06/11/2024 1315                  Medical Decision Making  Amount and/or Complexity of Data Reviewed  Labs: ordered.  Radiology: ordered and independent interpretation performed.    Risk  OTC drugs.  Prescription drug management.  Decision regarding hospitalization.             Disposition  Final diagnoses:   Pulmonary infection   Hypoxia   Bronchospasm     Time reflects when diagnosis was documented in both MDM as applicable and the Disposition within this note       Time User Action Codes Description Comment    6/11/2024  3:15 PM Agueda Higgins Add [J18.9] Pulmonary infection     6/11/2024  3:15 PM Agueda Higgins Add [R09.02] Hypoxia     6/11/2024  3:15 PM Agueda Higgins Add [J98.01] Bronchospasm     6/11/2024  5:38 PM Kassie Lovell Add [R06.02] Shortness of breath worsening  not improved with nebulizer  requiring 2 liters of oxygen via nasal canula          ED Disposition       ED Disposition   Admit    Condition   Stable    Date/Time   Tue Jun 11, 2024 1515    Comment   Case was discussed with Dr. Vicente and the patient's admission status was agreed to be Admission Status: inpatient status to the service of .               Follow-up Information       Follow up With Specialties Details Why Contact Info    Farhana Tompkins DO Family Medicine Schedule an appointment as soon as possible for a visit in 3 day(s)  [address] [id number] [phone]              Discharge Medication List as of 6/17/2024  2:23 PM        START taking these medications     Details   benzonatate (TESSALON PERLES) 100 mg capsule Take 1 capsule (100 mg total) by mouth 3 (three) times a day, Starting Mon 6/17/2024, Normal      dextromethorphan-guaiFENesin (ROBITUSSIN DM)  mg/5 mL syrup Take 10 mL by mouth 4 (four) times a day as needed for cough, Starting Mon 6/17/2024, Normal      Diclofenac Sodium (VOLTAREN) 1 % Apply 2 g topically 4 (four) times a day, Starting Mon 6/17/2024, Normal      fluticasone-umeclidinium-vilanterol (Trelegy Ellipta) 200-62.5-25 mcg/actuation AEPB inhaler Inhale 1 puff daily Rinse mouth after use., Starting Mon 6/17/2024, Until Wed 7/17/2024, Normal      ipratropium (ATROVENT) 0.02 % nebulizer solution Take 2.5 mL (0.5 mg total) by nebulization every 8 (eight) hours as needed for wheezing or shortness of breath, Starting Mon 6/17/2024, Normal      levalbuterol (XOPENEX) 1.25 mg/3 mL nebulizer solution Take 3 mL (1.25 mg total) by nebulization every 8 (eight) hours as needed for wheezing or shortness of breath, Starting Mon 6/17/2024, Normal      methocarbamol (ROBAXIN) 750 mg tablet Take 1 tablet (750 mg total) by mouth every 8 (eight) hours as needed for muscle spasms, Starting Mon 6/17/2024, Normal      predniSONE 10 mg tablet Multiple Dosages:Starting Tue 6/18/2024, Until Tue 6/18/2024 at 2359, THEN Starting Wed 6/19/2024, Until Fri 6/21/2024 at 2359, THEN Starting Sat 6/22/2024, Until Mon 6/24/2024 at 2359, THEN Starting Tue 6/25/2024, Until Thu 6/27/2024 at 2359Take 4  tablets (40 mg total) by mouth daily for 1 day, THEN 3 tablets (30 mg total) daily for 3 days, THEN 2 tablets (20 mg total) daily for 3 days, THEN 1 tablet (10 mg total) daily for 3 days. Do not start before June 18, 2024., Normal           CONTINUE these medications which have NOT CHANGED    Details   albuterol (ProAir HFA) 90 mcg/act inhaler Inhale 2 puffs every 4 (four) hours as needed for wheezing, Starting Fri 5/31/2024, Normal      ALPRAZolam (XANAX) 0.25 mg tablet Take 0.25 mg  by mouth daily at bedtime as needed for anxiety, Historical Med      halobetasol (ULTRAVATE) 0.05 % ointment Apply topically 2 (two) times a day To affected area, Starting Mon 3/25/2024, Normal      metoprolol succinate (TOPROL-XL) 50 mg 24 hr tablet Take 50 mg by mouth daily, Starting Mon 5/24/2021, Until Tue 6/11/2024, Historical Med      amLODIPine (NORVASC) 10 mg tablet Take 10 mg by mouth daily, Starting Mon 5/24/2021, Until Fri 5/31/2024, Historical Med      diazepam (VALIUM) 5 mg tablet Take 5 mg by mouth daily at bedtime as needed, Starting Wed 2/7/2024, Historical Med           STOP taking these medications       doxycycline hyclate (VIBRA-TABS) 100 mg tablet Comments:   Reason for Stopping:               Outpatient Discharge Orders   Ambulatory referral to Pulmonology   Standing Status: Future Standing Exp. Date: 06/17/25      Activity as tolerated     No dressing needed       PDMP Review         Value Time User    PDMP Reviewed  Yes 6/17/2024  4:51 PM Farhana Tompkins DO            ED Provider  Electronically Signed by             Agueda Higgins MD  06/11/24 3089       Agueda Higgins MD  06/17/24 [id number]

## 2024-06-11 NOTE — PLAN OF CARE
Problem: PAIN - ADULT  Goal: Verbalizes/displays adequate comfort level or baseline comfort level  Description: Interventions:  - Encourage patient to monitor pain and request assistance  - Assess pain using appropriate pain scale  - Administer analgesics based on type and severity of pain and evaluate response  - Implement non-pharmacological measures as appropriate and evaluate response  - Consider cultural and social influences on pain and pain management  - Notify physician/advanced practitioner if interventions unsuccessful or patient reports new pain  Outcome: Progressing     Problem: INFECTION - ADULT  Goal: Absence or prevention of progression during hospitalization  Description: INTERVENTIONS:  - Assess and monitor for signs and symptoms of infection  - Monitor lab/diagnostic results  - Monitor all insertion sites, i.e. indwelling lines, tubes, and drains  - Monitor endotracheal if appropriate and nasal secretions for changes in amount and color  - [place] appropriate cooling/warming therapies per order  - Administer medications as ordered  - Instruct and encourage patient and family to use good hand hygiene technique  - Identify and instruct in appropriate isolation precautions for identified infection/condition  Outcome: Progressing     Problem: Knowledge Deficit  Goal: Patient/family/caregiver demonstrates understanding of disease process, treatment plan, medications, and discharge instructions  Description: Complete learning assessment and assess knowledge base.  Interventions:  - Provide teaching at level of understanding  - Provide teaching via preferred learning methods  Outcome: Progressing

## 2024-06-11 NOTE — H&P
[facility]  H&P  Name: Marci Lopez 54 y.o. female I MRN: [id number]  Unit/Bed#: ED-22 I Date of Admission: 6/11/2024   Date of Service: 6/11/2024 I Hospital Day: 0      Assessment & Plan   * Pneumonia  Assessment & Plan  Presented with worsening productive cough, dyspnea, fevers since May 22  Failed outpatient treatment with amoxicillin for 10 days and a 6-day prednisone; followed by doxycycline for 10 days  Seen PCP today due to worsening symptoms; Unable to be weaned off of oxygen after nebulizer treatments and remained on 2.5 L. Upon EMS arrival received magnesium, Solu-Medrol and 3 albuterol nebulizer treatments.   CXR was unremarkable  CTA was negative for PE. Diffuse tree-in-bud nodularity. Most commonly seen in the setting of infection.  Procalcitonin negative, leukocytosis of 13.05, lactic acid 2.5 -> 2.2  S/p rocephin x1 and Azithromycin x1 in the ED    Plan:  Continue Rocephin and Azithromycin  Mucinex 600 mg   Robitussin DM  Robaxin 500 mg for chest muscular pain  Incentive spirometry  Airway clearance protocol  Follow sputum culture  Follow blood culture  CBC  Trend procalcitonin  BMP  Urine Strep and Legionella    Essential hypertension  Assessment & Plan  Continue home meds Amlodipine 10 mg and Toprol XL 50 mg    SIRS (systemic inflammatory response syndrome) (HCC)  Assessment & Plan  SIRS criteria with leukocytosis, tachypnea and tachycardia  Likely in setting of pneumonia    See plan under pneumonia    Diarrhea  Assessment & Plan  Chronic diarrhea due to malabsorption    Psoriasis  Assessment & Plan  Home medication Ultravate; use available conversion         VTE Pharmacologic Prophylaxis: VTE Score: 6 High Risk (Score >/= 5) - Pharmacological DVT Prophylaxis Ordered: enoxaparin (Lovenox). Sequential Compression Devices Ordered.  Code Status: Level 1 - Full Code Patient  Discussion with family: Attempted to update  (son) via phone. Unable to  contact.    Anticipated Length of Stay: Patient will be admitted on an inpatient basis with an anticipated length of stay of greater than 2 midnights secondary to Pneumonia.    Chief Complaint: shortness of breath and productive cough    History of Present Illness:  Marci Lopez is a 54 y.o. female with a PMH of HTN, psoriasis, anxiety, former smoker who presents with worsening productive cough, dyspnea, fevers since May 22.  Patient reports that she recently traveled to [facility] and was exposed to sick contacts with similar symptoms.  On May 22 she developed sore throat which led her to go to her PCP and was prescribed amoxicillin for 10 days.  Due to developing worsening cough 2 days after, patient went to the PCP and got prescribed 6 days of prednisone.  Patient completed treatment without any improving symptoms.  Subsequently she was prescribed doxycycline for 10 days and nebulizer treatments, which she completed. Due to ongoing symptoms, fever, wheezing, dyspnea patient went to her PCP today and became hypoxic requiring 2 L of oxygen despite nebulizer treatments at the office.  She was transported via EMS to the ED and received magnesium, nebulizer treatments and Solu-Medrol in transit.    In the ED, CTA was negative for PE but had findings concerning for pneumonia.  Patient met SIRS with tachycardia and leukocytosis.  Patient was weaned off oxygen successfully to room air.    On my evaluation, patient reported productive cough, chest discomfort while coughing and poor appetite.  Denies fever, chills, dyspnea, nausea.    Review of Systems:  Review of Systems   Constitutional:  Positive for appetite change and fatigue.   Respiratory:  Positive for cough and shortness of breath.    Cardiovascular:  Positive for chest pain. Negative for palpitations and leg swelling.   Gastrointestinal:  Positive for diarrhea (chronic) and vomiting. Negative for abdominal pain, constipation and nausea.       Past Medical and  Surgical History:   Past Medical History:   Diagnosis Date    Allergic rhinitis     Anxiety     Colon polyp     Disease of thyroid gland     Endometriosis     Fibroid     Hypertension     Kidney stones     left side    Migraine     teenager       Past Surgical History:   Procedure Laterality Date    ABDOMINAL SURGERY  05/09/2023    tummy tuck hernia repair    ABDOMINOPLASTY      APPENDECTOMY      CHOLECYSTECTOMY      FOOT FRACTURE SURGERY  2021    HERNIA REPAIR      umbilical x 3 last one 05/2023    KNEE CARTILAGE SURGERY Right     and ACL    LAPAROSCOPIC GASTRIC BANDING      removed    OVARIAN CYST SURGERY      SHOULDER SURGERY Right     x 3    STOMACH SURGERY  05/2023    tummy tuck       Meds/Allergies:  Prior to Admission medications    Medication Sig Start Date End Date Taking? Authorizing Provider   albuterol (ProAir HFA) 90 mcg/act inhaler Inhale 2 puffs every 4 (four) hours as needed for wheezing 5/31/24  Yes Farhana Tompkins, DO   ALPRAZolam (XANAX) 0.25 mg tablet Take 0.25 mg by mouth daily at bedtime as needed for anxiety   Yes Historical Provider, MD   halobetasol (ULTRAVATE) 0.05 % ointment Apply topically 2 (two) times a day To affected area 3/25/24  Yes Farhana Tompkins,    metoprolol succinate (TOPROL-XL) 50 mg 24 hr tablet Take 50 mg by mouth daily 5/24/21 6/11/24 Yes Historical Provider, MD   celecoxib (CeleBREX) 200 mg capsule 200 mg 2 (two) times a day 5/13/24 6/11/24 Yes Historical Provider, MD   amLODIPine (NORVASC) 10 mg tablet Take 10 mg by mouth daily 5/24/21 5/31/24  Historical Provider, MD   diazepam (VALIUM) 5 mg tablet Take 5 mg by mouth daily at bedtime as needed 2/7/24   Historical Provider, MD   doxycycline hyclate (VIBRA-TABS) 100 mg tablet Take 1 tablet (100 mg total) by mouth 2 (two) times a day for 10 days 5/31/24 6/10/24  Farhnaa Tompkins DO   Celecoxib (CELEBREX PO) 2 (two) times a day  6/11/24  Historical Provider, MD   cyclobenzaprine (FLEXERIL) 10 mg tablet 10 mg PRN  6/11/24  Historical  Provider, MD   Hydrocod Wes-Chlorphe Wes ER (TUSSIONEX) 10-8 mg/5 mL ER suspension Take 5 mL by mouth every 12 (twelve) hours as needed for cough Max Daily Amount: 10 mL  Patient not taking: Reported on 2024  Farhana Tompkins DO     I have reviewed home medications with patient personally.    Allergies:   Allergies   Allergen Reactions    Adhesive [Medical Tape] Rash     welts and burns    Betadine [Povidone Iodine] Rash     Burn and swelling    Macrobid [Nitrofurantoin] Throat Swelling       Social History:  Marital Status: Single   Occupation:   Patient Pre-hospital Living Situation: Home  Patient Pre-hospital Level of Mobility: walks  Patient Pre-hospital Diet Restrictions: none  Substance Use History:   Social History     Substance and Sexual Activity   Alcohol Use Not Currently     Social History     Tobacco Use   Smoking Status Former    Current packs/day: 0.00    Average packs/day: 0.3 packs/day for 44.0 years (11.0 ttl pk-yrs)    Types: Cigarettes    Start date: 6/15/1984    Quit date: 2023    Years since quittin.1    Passive exposure: Past   Smokeless Tobacco Never     Social History     Substance and Sexual Activity   Drug Use Not Currently    Types: Marijuana    Comment: gummies to sleep for aniety and pain       Family History:  Family History   Problem Relation Age of Onset    Stroke Mother     Hypertension Mother     Diabetes Father     Coronary artery disease Father     Pancreatic cancer Father     Heart disease Father     Cancer Father         pancreatic    Hypertension Father     Diabetes Sister     Psoriasis Sister     Hypertension Sister     Breast cancer Maternal Grandmother 80    Breast cancer Maternal Aunt 58    Breast cancer Paternal Aunt         unkown age       Physical Exam:     Vitals:   Blood Pressure: 117/60 (24 1900)  Pulse: 91 (24 1900)  Temperature: 98.2 °F (36.8 °C) (24 1312)  Temp Source: Oral (24 1312)  Respirations: 22 (24  "1900)  Weight - Scale: 90.1 kg (198 lb 10.2 oz) (06/11/24 1303)  SpO2: 91 % (06/11/24 1900)    Physical Exam  Constitutional:       General: She is not in acute distress.     Appearance: Normal appearance. She is not ill-appearing.   HENT:      Head: Normocephalic and atraumatic.      Nose: Nose normal.      Mouth/Throat:      Mouth: Mucous membranes are moist.   Cardiovascular:      Rate and Rhythm: Normal rate and regular rhythm.      Pulses: Normal pulses.      Heart sounds: Normal heart sounds.   Pulmonary:      Effort: No respiratory distress.      Breath sounds: Rhonchi present. No wheezing.   Chest:      Chest wall: Tenderness present.   Abdominal:      General: Bowel sounds are normal. There is no distension.      Palpations: Abdomen is soft.      Tenderness: There is no abdominal tenderness.   Musculoskeletal:      Right lower leg: No edema.      Left lower leg: No edema.   Neurological:      Mental Status: She is alert and oriented to person, place, and time.          Additional Data:     Lab Results:  Results from last 7 days   Lab Units 06/11/24  1304   WBC Thousand/uL 13.05*   HEMOGLOBIN g/dL 13.2   HEMATOCRIT % 41.7   PLATELETS Thousands/uL 521*   SEGS PCT % 49   LYMPHO PCT % 37   MONO PCT % 6   EOS PCT % 6     Results from last 7 days   Lab Units 06/11/24  1304   SODIUM mmol/L 141   POTASSIUM mmol/L 3.5   CHLORIDE mmol/L 106   CO2 mmol/L 25   BUN mg/dL 17   CREATININE mg/dL 0.78   ANION GAP mmol/L 10   CALCIUM mg/dL 9.4   ALBUMIN g/dL 4.1   TOTAL BILIRUBIN mg/dL 0.67   ALK PHOS U/L 85   ALT U/L 18   AST U/L 18   GLUCOSE RANDOM mg/dL 129     Results from last 7 days   Lab Units 06/11/24  1304   INR  1.02         No results found for: \"HGBA1C\"  Results from last 7 days   Lab Units 06/11/24  1515 06/11/24  1304   LACTIC ACID mmol/L 2.2* 2.5*   PROCALCITONIN ng/ml  --  <0.05       Lines/Drains:  Invasive Devices       Peripheral Intravenous Line  Duration             Peripheral IV 06/11/24 Dorsal " (posterior);Right Hand <1 day    Peripheral IV 06/11/24 Left Antecubital <1 day                        Imaging: Reviewed radiology reports from this admission including: chest xray and chest CT scan  CTA ED chest PE study   Final Result by El Garcia MD (06/11 4306)      No pulmonary embolism.      Diffuse tree-in-bud nodularity. Most commonly seen in the setting of infection.      Small hiatal hernia.                        Workstation performed: [id number]         XR chest portable   ED Interpretation by Agueda Higgins MD (06/11 1357)   Diffuse groundglass opacities-most dense in the right lower lung field      Final Result by Varinder Chowdary MD (06/11 4545)      No acute cardiopulmonary disease.            Workstation performed: [id number]             EKG and Other Studies Reviewed on Admission:   EKG: Sinus Tachycardia. .    ** Please Note: This note has been constructed using a voice recognition system. **

## 2024-06-11 NOTE — ASSESSMENT & PLAN NOTE
Presented with worsening productive cough, dyspnea, fevers since May 22  Failed outpatient treatment with amoxicillin for 10 days and a 6-day prednisone  Followed by doxycycline for 10 days  Seen PCP today due to worsening symptoms  Unable to be weaned off of oxygen after nebulizer treatments and remained on 2.5 L.  Upon EMS arrival received magnesium, Solu-Medrol, and 3 albuterol nebulizer treatments.   CXR was unremarkable  CTA was negative for PE, diffuse tree-in-bud nodularity, most commonly seen in the setting of infection.  Procalcitonin negative, leukocytosis of 13.05, lactic acid 2.5 -> 2.2  S/p rocephin x1 and Azithromycin x1 in the ED  Urine Strep and Legionella negative  Sputum culture grew 1+ epithelial cells, 2+ polys, 1+ gram positive cocci in pairs, 1+ gram positive rods    Plan:  Stopped Rocephin and Azithromycin for now  Mucinex 600 mg   Hycodan ordered  Robaxin 500 mg for muscular CP  Incentive spirometry  Airway clearance protocol  Follow blood culture- no preliminary growth  Monitor O2 sat

## 2024-06-11 NOTE — PROGRESS NOTES
"Ambulatory Visit  Name: Marci Lopez      : 1970      MRN: [id number]  Encounter Provider: Farhana Tompkins DO  Encounter Date: 2024   Encounter department: [facility]    Assessment & Plan   1. Shortness of breath  Comments:  worsening  not improved with nebulizer  requiring 2 liters of oxygen via nasal canula  Orders:  -     albuterol inhalation solution 2.5 mg   Sent to hospital via ambulance. Unable to wean off oxygen while here in the office.  Son was here with her and will meet her at the hospital.       History of Present Illness     She is not feeling better since her return from [facility].    She was initially seen here on May 22nd and was treated with amoxicillin.  She did not improve and was reevaluated on 24. Her antibiotic was changed to doxycycline at that time. She has been taking albuterol nebulizer which helps temporarily.    She came in today with wheezing and coughing.  The wheezing and coughing have been persistent and she is interested in getting a CT scan. She does have one scheduled for Friday.         Review of Systems    Objective     /88   Pulse (!) 108   Temp (!) 97.2 °F (36.2 °C) (Temporal)   Resp (!) 24   Ht 5' 5\" (1.651 m)   Wt 87.5 kg (193 lb)   SpO2 96% Comment: on 2 liters of oyxgen via NC  BMI 32.12 kg/m²     Physical Exam  Constitutional:       General: She is in acute distress.   Cardiovascular:      Rate and Rhythm: Normal rate and regular rhythm.   Pulmonary:      Effort: Tachypnea and respiratory distress present.      Breath sounds: Examination of the right-upper field reveals wheezing. Examination of the left-upper field reveals wheezing. Examination of the right-middle field reveals wheezing. Examination of the left-middle field reveals wheezing. Examination of the right-lower field reveals wheezing. Examination of the left-lower field reveals wheezing. Wheezing present.       Administrative Statements           "

## 2024-06-11 NOTE — ASSESSMENT & PLAN NOTE
Presented with worsening productive cough, dyspnea, fevers since May 22  Failed outpatient treatment with amoxicillin for 10 days and a 6-day prednisone; followed by doxycycline for 10 days  Seen PCP today due to worsening symptoms; Unable to be weaned off of oxygen after nebulizer treatments and remained on 2.5 L. Upon EMS arrival received magnesium, Solu-Medrol and 3 albuterol nebulizer treatments.   CXR was unremarkable  CTA was negative for PE. Diffuse tree-in-bud nodularity. Most commonly seen in the setting of infection.  Procalcitonin negative, leukocytosis of 13.05, lactic acid 2.5 -> 2.2  S/p rocephin x1 and Azithromycin x1 in the ED    Plan:  Continue Rocephin and Azithromycin  Mucinex 600 mg   Robitussin DM  Incentive spirometry  Airway clearance protocol  Follow sputum culture  Follow blood culture  CBC  Trend procalcitonin  BMP

## 2024-06-12 LAB
ANION GAP SERPL CALCULATED.3IONS-SCNC: 7 MMOL/L (ref 4–13)
ATRIAL RATE: 106 BPM
B PARAP IS1001 DNA NPH QL NAA+NON-PROBE: NOT DETECTED
B PERT.PT PRMT NPH QL NAA+NON-PROBE: NOT DETECTED
BUN SERPL-MCNC: 19 MG/DL (ref 5–25)
C PNEUM DNA NPH QL NAA+NON-PROBE: NOT DETECTED
CALCIUM SERPL-MCNC: 9.6 MG/DL (ref 8.4–10.2)
CHLORIDE SERPL-SCNC: 106 MMOL/L (ref 96–108)
CO2 SERPL-SCNC: 24 MMOL/L (ref 21–32)
CREAT SERPL-MCNC: 0.72 MG/DL (ref 0.6–1.3)
ERYTHROCYTE [DISTWIDTH] IN BLOOD BY AUTOMATED COUNT: 14.3 % (ref 11.6–15.1)
FLUAV RNA NPH QL NAA+NON-PROBE: NOT DETECTED
FLUBV RNA NPH QL NAA+NON-PROBE: NOT DETECTED
GFR SERPL CREATININE-BSD FRML MDRD: 94 ML/MIN/1.73SQ M
GLUCOSE SERPL-MCNC: 155 MG/DL (ref 65–140)
HADV DNA NPH QL NAA+NON-PROBE: NOT DETECTED
HCOV 229E RNA NPH QL NAA+NON-PROBE: NOT DETECTED
HCOV HKU1 RNA NPH QL NAA+NON-PROBE: NOT DETECTED
HCOV NL63 RNA NPH QL NAA+NON-PROBE: NOT DETECTED
HCOV OC43 RNA NPH QL NAA+NON-PROBE: NOT DETECTED
HCT VFR BLD AUTO: 37 % (ref 34.8–46.1)
HGB BLD-MCNC: 11.9 G/DL (ref 11.5–15.4)
HMPV RNA NPH QL NAA+NON-PROBE: NOT DETECTED
HPIV1 RNA NPH QL NAA+NON-PROBE: NOT DETECTED
HPIV2 RNA NPH QL NAA+NON-PROBE: NOT DETECTED
HPIV3 RNA NPH QL NAA+NON-PROBE: NOT DETECTED
HPIV4 RNA NPH QL NAA+NON-PROBE: NOT DETECTED
M PNEUMO DNA NPH QL NAA+NON-PROBE: NOT DETECTED
MCH RBC QN AUTO: 27.2 PG (ref 26.8–34.3)
MCHC RBC AUTO-ENTMCNC: 32.2 G/DL (ref 31.4–37.4)
MCV RBC AUTO: 85 FL (ref 82–98)
P AXIS: 68 DEGREES
PLATELET # BLD AUTO: 489 THOUSANDS/UL (ref 149–390)
PMV BLD AUTO: 10.3 FL (ref 8.9–12.7)
POTASSIUM SERPL-SCNC: 3.9 MMOL/L (ref 3.5–5.3)
PR INTERVAL: 134 MS
PROCALCITONIN SERPL-MCNC: <0.05 NG/ML
QRS AXIS: 60 DEGREES
QRSD INTERVAL: 92 MS
QT INTERVAL: 344 MS
QTC INTERVAL: 456 MS
RBC # BLD AUTO: 4.38 MILLION/UL (ref 3.81–5.12)
RSV RNA NPH QL NAA+NON-PROBE: NOT DETECTED
RV+EV RNA NPH QL NAA+NON-PROBE: NOT DETECTED
SARS-COV-2 RNA NPH QL NAA+NON-PROBE: NOT DETECTED
SODIUM SERPL-SCNC: 137 MMOL/L (ref 135–147)
T WAVE AXIS: -72 DEGREES
VENTRICULAR RATE: 106 BPM
WBC # BLD AUTO: 13.41 THOUSAND/UL (ref 4.31–10.16)

## 2024-06-12 PROCEDURE — 93010 ELECTROCARDIOGRAM REPORT: CPT | Performed by: INTERNAL MEDICINE

## 2024-06-12 PROCEDURE — 85027 COMPLETE CBC AUTOMATED: CPT

## 2024-06-12 PROCEDURE — 94760 N-INVAS EAR/PLS OXIMETRY 1: CPT

## 2024-06-12 PROCEDURE — 80048 BASIC METABOLIC PNL TOTAL CA: CPT

## 2024-06-12 PROCEDURE — 94640 AIRWAY INHALATION TREATMENT: CPT

## 2024-06-12 PROCEDURE — 99232 SBSQ HOSP IP/OBS MODERATE 35: CPT | Performed by: INTERNAL MEDICINE

## 2024-06-12 PROCEDURE — 84145 PROCALCITONIN (PCT): CPT

## 2024-06-12 RX ORDER — FLUTICASONE PROPIONATE 50 MCG
1 SPRAY, SUSPENSION (ML) NASAL DAILY
Status: DISCONTINUED | OUTPATIENT
Start: 2024-06-12 | End: 2024-06-17 | Stop reason: HOSPADM

## 2024-06-12 RX ORDER — GUAIFENESIN/DEXTROMETHORPHAN 100-10MG/5
10 SYRUP ORAL EVERY 6 HOURS
Status: DISCONTINUED | OUTPATIENT
Start: 2024-06-12 | End: 2024-06-12

## 2024-06-12 RX ORDER — LEVALBUTEROL INHALATION SOLUTION 1.25 MG/3ML
1.25 SOLUTION RESPIRATORY (INHALATION) EVERY 8 HOURS PRN
Status: DISCONTINUED | OUTPATIENT
Start: 2024-06-12 | End: 2024-06-13

## 2024-06-12 RX ORDER — BUTALBITAL, ACETAMINOPHEN AND CAFFEINE 50; 325; 40 MG/1; MG/1; MG/1
1 TABLET ORAL EVERY 4 HOURS PRN
Status: DISCONTINUED | OUTPATIENT
Start: 2024-06-12 | End: 2024-06-17 | Stop reason: HOSPADM

## 2024-06-12 RX ORDER — PANTOPRAZOLE SODIUM 20 MG/1
40 TABLET, DELAYED RELEASE ORAL
Status: DISCONTINUED | OUTPATIENT
Start: 2024-06-12 | End: 2024-06-12

## 2024-06-12 RX ORDER — PANTOPRAZOLE SODIUM 40 MG/1
40 TABLET, DELAYED RELEASE ORAL
Status: DISCONTINUED | OUTPATIENT
Start: 2024-06-12 | End: 2024-06-17 | Stop reason: HOSPADM

## 2024-06-12 RX ORDER — LEVALBUTEROL INHALATION SOLUTION 0.63 MG/3ML
0.31 SOLUTION RESPIRATORY (INHALATION) EVERY 8 HOURS PRN
Status: DISCONTINUED | OUTPATIENT
Start: 2024-06-12 | End: 2024-06-12

## 2024-06-12 RX ORDER — HYDROCODONE BITARTRATE AND HOMATROPINE METHYLBROMIDE ORAL SOLUTION 5; 1.5 MG/5ML; MG/5ML
5 LIQUID ORAL EVERY 4 HOURS PRN
Status: DISCONTINUED | OUTPATIENT
Start: 2024-06-12 | End: 2024-06-16

## 2024-06-12 RX ADMIN — ALBUTEROL SULFATE 2 PUFF: 90 AEROSOL, METERED RESPIRATORY (INHALATION) at 16:58

## 2024-06-12 RX ADMIN — Medication 5 ML: at 12:13

## 2024-06-12 RX ADMIN — GUAIFENESIN 600 MG: 600 TABLET ORAL at 09:28

## 2024-06-12 RX ADMIN — CLOBETASOL PROPIONATE: 0.5 CREAM TOPICAL at 09:31

## 2024-06-12 RX ADMIN — BUTALBITAL, ACETAMINOPHEN, AND CAFFEINE 1 TABLET: 325; 50; 40 TABLET ORAL at 12:16

## 2024-06-12 RX ADMIN — FLUTICASONE PROPIONATE 1 SPRAY: 50 SPRAY, METERED NASAL at 12:09

## 2024-06-12 RX ADMIN — METOPROLOL SUCCINATE 50 MG: 50 TABLET, EXTENDED RELEASE ORAL at 09:28

## 2024-06-12 RX ADMIN — Medication 5 ML: at 16:44

## 2024-06-12 RX ADMIN — PANTOPRAZOLE SODIUM 40 MG: 20 TABLET, DELAYED RELEASE ORAL at 16:44

## 2024-06-12 RX ADMIN — METHOCARBAMOL TABLETS 500 MG: 500 TABLET, COATED ORAL at 19:14

## 2024-06-12 RX ADMIN — CALCIUM CARBONATE (ANTACID) CHEW TAB 500 MG 500 MG: 500 CHEW TAB at 00:03

## 2024-06-12 RX ADMIN — CLOBETASOL PROPIONATE: 0.5 CREAM TOPICAL at 18:32

## 2024-06-12 RX ADMIN — GUAIFENESIN 600 MG: 600 TABLET ORAL at 20:03

## 2024-06-12 RX ADMIN — ACETAMINOPHEN 975 MG: 325 TABLET, FILM COATED ORAL at 05:28

## 2024-06-12 RX ADMIN — Medication 5 ML: at 20:32

## 2024-06-12 RX ADMIN — LEVALBUTEROL HYDROCHLORIDE 0.31 MG: 0.63 SOLUTION RESPIRATORY (INHALATION) at 20:05

## 2024-06-12 RX ADMIN — PANTOPRAZOLE SODIUM 40 MG: 20 TABLET, DELAYED RELEASE ORAL at 05:28

## 2024-06-12 RX ADMIN — AMLODIPINE BESYLATE 10 MG: 10 TABLET ORAL at 09:28

## 2024-06-12 RX ADMIN — ALPRAZOLAM 0.25 MG: 0.25 TABLET ORAL at 21:57

## 2024-06-12 NOTE — PLAN OF CARE
Problem: PAIN - ADULT  Goal: Verbalizes/displays adequate comfort level or baseline comfort level  Description: Interventions:  - Encourage patient to monitor pain and request assistance  - Assess pain using appropriate pain scale  - Administer analgesics based on type and severity of pain and evaluate response  - Implement non-pharmacological measures as appropriate and evaluate response  - Consider cultural and social influences on pain and pain management  - Notify physician/advanced practitioner if interventions unsuccessful or patient reports new pain  Outcome: Progressing     Problem: INFECTION - ADULT  Goal: Absence or prevention of progression during hospitalization  Description: INTERVENTIONS:  - Assess and monitor for signs and symptoms of infection  - Monitor lab/diagnostic results  - Monitor all insertion sites, i.e. indwelling lines, tubes, and drains  - Monitor endotracheal if appropriate and nasal secretions for changes in amount and color  - [place] appropriate cooling/warming therapies per order  - Administer medications as ordered  - Instruct and encourage patient and family to use good hand hygiene technique  - Identify and instruct in appropriate isolation precautions for identified infection/condition  Outcome: Progressing  Goal: Absence of fever/infection during neutropenic period  Description: INTERVENTIONS:  - Monitor WBC    Outcome: Progressing     Problem: SAFETY ADULT  Goal: Patient will remain free of falls  Description: INTERVENTIONS:  - Educate patient/family on patient safety including physical limitations  - Instruct patient to call for assistance with activity   - Consult OT/PT to assist with strengthening/mobility   - Keep Call bell within reach  - Keep bed low and locked with side rails adjusted as appropriate  - Keep care items and personal belongings within reach  - Initiate and maintain comfort rounds  - Make Fall Risk Sign visible to staff  - Offer Toileting every  Hours,  in advance of need  - Initiate/Maintain alarm  - Obtain necessary fall risk management equipment:   - Apply yellow socks and bracelet for high fall risk patients  - Consider moving patient to room near nurses station  Outcome: Progressing  Goal: Maintain or return to baseline ADL function  Description: INTERVENTIONS:  -  Assess patient's ability to carry out ADLs; assess patient's baseline for ADL function and identify physical deficits which impact ability to perform ADLs (bathing, care of mouth/teeth, toileting, grooming, dressing, etc.)  - Assess/evaluate cause of self-care deficits   - Assess range of motion  - Assess patient's mobility; develop plan if impaired  - Assess patient's need for assistive devices and provide as appropriate  - Encourage maximum independence but intervene and supervise when necessary  - Involve family in performance of ADLs  - Assess for home care needs following discharge   - Consider OT consult to assist with ADL evaluation and planning for discharge  - Provide patient education as appropriate  Outcome: Progressing  Goal: Maintains/Returns to pre admission functional level  Description: INTERVENTIONS:  - Perform AM-PAC 6 Click Basic Mobility/ Daily Activity assessment daily.  - Set and communicate daily mobility goal to care team and patient/family/caregiver.   - Collaborate with rehabilitation services on mobility goals if consulted  - Perform Range of Motion  times a day.  - Reposition patient every  hours.  - Dangle patient  times a day  - Stand patient  times a day  - Ambulate patient  times a day  - Out of bed to chair  times a day   - Out of bed for meals times a day  - Out of bed for toileting  - Record patient progress and toleration of activity level   Outcome: Progressing     Problem: DISCHARGE PLANNING  Goal: Discharge to home or other facility with appropriate resources  Description: INTERVENTIONS:  - Identify barriers to discharge w/patient and caregiver  - Arrange for  needed discharge resources and transportation as appropriate  - Identify discharge learning needs (meds, wound care, etc.)  - Arrange for interpretive services to assist at discharge as needed  - Refer to Case Management Department for coordinating discharge planning if the patient needs post-hospital services based on physician/advanced practitioner order or complex needs related to functional status, cognitive ability, or social support system  Outcome: Progressing     Problem: Knowledge Deficit  Goal: Patient/family/caregiver demonstrates understanding of disease process, treatment plan, medications, and discharge instructions  Description: Complete learning assessment and assess knowledge base.  Interventions:  - Provide teaching at level of understanding  - Provide teaching via preferred learning methods  Outcome: Progressing

## 2024-06-12 NOTE — UTILIZATION REVIEW
Initial Clinical Review    Admission: Date/Time/Statement:   Admission Orders (From admission, onward)       Ordered        06/11/24 1516  INPATIENT ADMISSION  Once                          Orders Placed This Encounter   Procedures    INPATIENT ADMISSION     Standing Status:   Standing     Number of Occurrences:   1     Order Specific Question:   Level of Care     Answer:   Med Surg [16]     Order Specific Question:   Estimated length of stay     Answer:   More than 2 Midnights     Order Specific Question:   Certification     Answer:   I certify that inpatient services are medically necessary for this patient for a duration of greater than two midnights. See H&P and MD Progress Notes for additional information about the patient's course of treatment.     ED Arrival Information       Expected   -    Arrival   6/11/2024 12:56    Acuity   Emergent              Means of arrival   Ambulance    Escorted by   [facility]    Service   Hospitalist    Admission type   Emergency              Arrival complaint   EMS             Chief Complaint   Patient presents with    Shortness of Breath     Pt c/o increasing SOB [place]. Received 2g mag, 0.25 terbutaline SQ, 62.5solumedrol, 4mg zofran, 3 albuterol and 1 duoneb PTA. Family dx with pneumonia.        Initial Presentation: 54 y.o. female with hx HTN, psoriasis, anxiety, former smoker, chronic diarrhea  who presents to ED via EMS from PCP office  with worsening productive cough, dyspnea, fevers since May 22.  Patient reports that she recently traveled to [facility] and was exposed to sick contacts with similar symptoms.  On May 22 she developed sore throat - her PCP and was prescribed amoxicillin for 10 days.  Due to developing worsening cough 2 days after, patient went to the PCP and got prescribed 6 days of prednisone.  Patient completed treatment without any improving symptoms.  Subsequently she was prescribed doxycycline for 10  days and nebulizer treatments, which she completed. Due to ongoing symptoms, fever, wheezing, dyspnea patient went to her PCP today and became hypoxic requiring 2 L of oxygen despite nebulizer treatments at the office . Pt given magnesium, 3 albuterol nebulizer treatments and Solu-Medrol in transit. Pt reports productive cough, chest discomfort while coughing and poor appetite    On exam, tachycardic , tachypneic with accessory muscle usage . Pt on 8L mask O2 , able to be weaned to RA with sat low 90's . Diffuse coarse wheezes . Rhonchi noted , chest wall tenderness. Labs Elevated WBC13.05 , elevated lactic acid 2.5 .  CTA was negative for PE but had findings concerning for pneumonia  . Pt given IVF,  Neb, IV analgesic, IV abx in ED. Admitted as Inpatient with Pneumonia, SIRS . Plan- IV abx- Rocephin and Azithromycin. F/U blood and sputum cx . Urine antigens . Trend procal. CBC, BMP in am. Mucinex. Robitussin ,. PRN Robaxin . Continue home antihypertensives.     Anticipated Length of Stay/Certification Statement: Patient will be admitted on an inpatient basis with an anticipated length of stay of greater than 2 midnights secondary to Pneumonia.     Date: 6/12    Day 2:     Pt reports trouble sleeping 2/2 cough, expressed relief w/ O2 . Cough no longer productive .Notes abdominal and chest pain from coughing.  Pt nauseated , having difficulty eating . Vomited x 1 that she attributed to neb tx . Has a headache that began upon arrival to ED that began on the right side and now feels like pressure over the entire head. Feels somewhat SOB. On exam,  mild wheezing bilaterally in middle and lower lobes. Urine Strep and Legionella negative . Sputum culture grew 1+ epithelial cells, 2+ polys, 1+ gram positive cocci in pairs, 1+ gram positive rods . Stopped Rocephin and Azithromycin for now. Hycodan ordered. Robaxin for muscular pain . PPI increased to BID .        ED Triage Vitals   Temperature Pulse Respirations Blood  Pressure SpO2 Pain Score   06/11/24 1312 06/11/24 1302 06/11/24 1302 06/11/24 1302 06/11/24 1302 06/11/24 1947   98.2 °F (36.8 °C) (!) 108 (!) 24 149/97 99 % on 8 L simple mask  No Pain     Weight (last 2 days)       Date/Time Weight    06/11/24 2227 90.1 (198.63)    06/11/24 1303 90.1 (198.63)            Vital Signs (last 3 days)       Date/Time Temp Pulse Resp BP MAP (mmHg) SpO2 Calculated FIO2 (%) - Nasal Cannula O2 Flow Rate (L/min) Nasal Cannula O2 Flow Rate (L/min) O2 Device Patient Position - Orthostatic VS Lillian Coma Scale Score Pain    06/12/24 0700 98.5 °F (36.9 °C) 97 16 141/87 -- 94 % -- -- -- -- Sitting -- --    06/12/24 0528 -- -- -- -- -- -- -- -- -- -- -- -- 6    06/12/24 0322 98.5 °F (36.9 °C) 91 -- 128/74 97 92 % -- -- -- None (Room air) Sitting -- --    06/11/24 2227 98.1 °F (36.7 °C) 88 18 114/63 83 94 % 28 -- 2 L/min Nasal cannula Lying -- --    06/11/24 2129 -- -- -- -- -- -- -- -- -- -- -- -- 5    06/11/24 1950 -- -- -- -- -- -- 28 -- 2 L/min Nasal cannula -- 15 --    06/11/24 1947 -- -- -- -- -- -- -- -- -- -- -- -- No Pain    06/11/24 1930 97.9 °F (36.6 °C) 93 20 120/65 88 98 % -- -- -- None (Room air) Sitting -- --    06/11/24 1900 -- 91 22 117/60 82 91 % -- -- -- None (Room air) Sitting -- --    06/11/24 1800 -- 86 22 123/59 81 92 % -- -- -- None (Room air) -- -- --    06/11/24 1700 -- 83 22 118/53 76 92 % -- -- -- None (Room air) -- -- --    06/11/24 1615 -- 94 -- -- -- 96 % -- -- -- None (Room air)  -- -- --    06/11/24 1600 -- 83 22 117/56 79 94 % 28 -- 2 L/min Nasal cannula -- -- --    06/11/24 1515 -- 84 22 149/64 -- 94 % 28 -- 2 L/min Nasal cannula -- -- --    06/11/24 1418 -- -- -- -- -- 96 % -- -- -- -- -- -- --    06/11/24 1313 -- -- -- -- -- -- -- -- -- -- -- 15 --    06/11/24 1312 98.2 °F (36.8 °C) 104 -- -- -- -- -- -- -- -- -- -- --              Pertinent Labs/Diagnostic Test Results:   Radiology:  CTA ED chest PE study   Final Interpretation by El Garcia MD (06/11  1456)      No pulmonary embolism.      Diffuse tree-in-bud nodularity. Most commonly seen in the setting of infection.      Small hiatal hernia.                        Workstation performed: [id number]         XR chest portable   ED Interpretation by Agueda Higgins MD (06/11 1357)   Diffuse groundglass opacities-most dense in the right lower lung field      Final Interpretation by Varinder Chowdary MD (06/11 1615)      No acute cardiopulmonary disease.            Workstation performed: [id number]           Cardiology:  6/11 ECG- ECG rate:  106     ECG rate assessment: tachycardic    Rhythm:     Rhythm: sinus tachycardia    QRS:     QRS axis:  Normal   Conduction:     Conduction: normal    ST segments:     ST segments:  Abnormal     Depression:  II, III, aVF, V3, V4, V5 and V6   T waves:     T waves comment:  Biphasic inferiorly and laterally         Results from last 7 days   Lab Units 06/11/24  2141   SARS-COV-2  Not Detected     Results from last 7 days   Lab Units 06/12/24  0512 06/11/24  1304   WBC Thousand/uL 13.41* 13.05*   HEMOGLOBIN g/dL 11.9 13.2   HEMATOCRIT % 37.0 41.7   PLATELETS Thousands/uL 489* 521*   TOTAL NEUT ABS Thousands/µL  --  6.36         Results from last 7 days   Lab Units 06/12/24  0512 06/11/24  1304   SODIUM mmol/L 137 141   POTASSIUM mmol/L 3.9 3.5   CHLORIDE mmol/L 106 106   CO2 mmol/L 24 25   ANION GAP mmol/L 7 10   BUN mg/dL 19 17   CREATININE mg/dL 0.72 0.78   EGFR ml/min/1.73sq m 94 85   CALCIUM mg/dL 9.6 9.4     Results from last 7 days   Lab Units 06/11/24  1304   AST U/L 18   ALT U/L 18   ALK PHOS U/L 85   TOTAL PROTEIN g/dL 8.0   ALBUMIN g/dL 4.1   TOTAL BILIRUBIN mg/dL 0.67         Results from last 7 days   Lab Units 06/12/24  0512 06/11/24  1304   GLUCOSE RANDOM mg/dL 155* 129           Results from last 7 days   Lab Units 06/11/24  1304   HS TNI 0HR ng/L 4         Results from last 7 days   Lab Units 06/11/24  1304   PROTIME seconds 14.0   INR  1.02   PTT seconds 31          Results from last 7 days   Lab Units 06/12/24  0512 06/11/24  1304   PROCALCITONIN ng/ml <0.05 <0.05     Results from last 7 days   Lab Units 06/11/24  1515 06/11/24  1304   LACTIC ACID mmol/L 2.2* 2.5*             Results from last 7 days   Lab Units 06/11/24  1304   BNP pg/mL 13               Results from last 7 days   Lab Units 06/11/24  2141 06/11/24  2124   STREP PNEUMONIAE ANTIGEN, URINE   --  Negative   LEGIONELLA URINARY ANTIGEN   --  Negative   INFLUENZA B  Not Detected  --    RESPIRATORY SYNCYTIAL VIRUS  Not Detected  --      Results from last 7 days   Lab Units 06/11/24  2141   ADENOVIRUS  Not Detected   BORDETELLA PARAPERTUSSIS  Not Detected   BORDETELLA PERTUSSIS  Not Detected   CHLAMYDIA PNEUMONIAE  Not Detected   CORONAVIRUS 229E  Not Detected   CORONAVIRUS HKU1  Not Detected   CORONAVIRUS NL63  Not Detected   CORONAVIRUS OC43  Not Detected   METAPNEUMOVIRUS  Not Detected   RHINOVIRUS  Not Detected   MYCOPLASMA PNEUMONIAE  Not Detected   PARAINFLUENZA 1  Not Detected   PARAINFLUENZA 2  Not Detected   PARAINFLUENZA 3  Not Detected   PARAINFLUENZA 4  Not Detected                         Results from last 7 days   Lab Units 06/11/24  1325 06/11/24  1304   BLOOD CULTURE  Received in Microbiology Lab. Culture in Progress. Received in Microbiology Lab. Culture in Progress.                   ED Treatment-Medication Administration from 06/11/2024 1256 to 06/11/2024 1924         Date/Time Order Dose Route Action     06/11/2024 1322 sodium chloride 0.9 % bolus 500 mL 500 mL Intravenous New Bag     06/11/2024 1323 acetaminophen (TYLENOL) tablet 975 mg 975 mg Oral Given     06/11/2024 1418 albuterol inhalation solution 10 mg 10 mg Nebulization Given     06/11/2024 1418 ipratropium (ATROVENT) 0.02 % inhalation solution 1 mg 1 mg Nebulization Given     06/11/2024 1418 sodium chloride 0.9 % inhalation solution 12 mL 12 mL Nebulization Given     06/11/2024 1402 iohexol (OMNIPAQUE) 350 MG/ML injection  (MULTI-DOSE) 100 mL 85 mL Intravenous Given     06/11/2024 1501 ketorolac (TORADOL) injection 9.9 mg 9.9 mg Intravenous Given     06/11/2024 1515 ceftriaxone (ROCEPHIN) 2 g/50 mL in dextrose IVPB 2,000 mg Intravenous New Bag     06/11/2024 1616 azithromycin (ZITHROMAX) 500 mg in sodium chloride 0.9% 250mL IVPB 500 mg 500 mg Intravenous New Bag     06/11/2024 1749 albuterol inhalation solution 2.5 mg 2.5 mg Nebulization Given     06/11/2024 1840 clobetasol (TEMOVATE) 0.05 % cream -- Topical Given            Past Medical History:   Diagnosis Date    Allergic rhinitis     Anxiety     Colon polyp     Disease of thyroid gland     Endometriosis     Fibroid     Hypertension     Kidney stones     left side    Migraine     teenager     Present on Admission:   Essential hypertension   Anxiety   Psoriasis   Pneumonia   Diarrhea   SIRS (systemic inflammatory response syndrome) ([facility])      Admitting Diagnosis: Shortness of breath [R06.02]  Bronchospasm [J98.01]  SOB (shortness of breath) [R06.02]  Hypoxia [R09.02]  Pulmonary infection [J18.9]  Age/Sex: 54 y.o. female  Admission Orders:  Scheduled Medications:  amLODIPine, 10 mg, Oral, Daily  clobetasol, , Topical, BID  dextromethorphan-guaiFENesin, 10 mL, Oral, Q6H  enoxaparin, 40 mg, Subcutaneous, Daily  fluticasone, 1 spray, Each Nare, Daily  guaiFENesin, 600 mg, Oral, Q12H YOLY  metoprolol succinate, 50 mg, Oral, Daily  pantoprazole, 40 mg, Oral, BID Start: 06/12/24 1600     ceftriaxone (ROCEPHIN) 1 g/50 mL in dextrose IVPB  Dose: 1,000 mg  Freq: Every 24 hours Route: IV  Start: 06/12/24 1600 End: 06/12/24 0818  azithromycin (ZITHROMAX) 500 mg in sodium chloride 0.9% 250mL IVPB 500 mg  Dose: 500 mg  Freq: Every 24 hours Route: IV  Start: 06/12/24 1600 End: 06/12/24 0631  pantoprazole (PROTONIX) EC tablet 40 mg  Dose: 40 mg  Freq: Daily (early morning) Route: PO  Start: 06/12/24 0600 End: 06/12/24 1134    Continuous IV Infusions:     PRN Meds:  acetaminophen, 975 mg, Oral, Q6H  PRN x1 6/11, x1 6/12   End: 06/12/24 1002   albuterol, 2 puff, Inhalation, Q4H PRN  ALPRAZolam, 0.25 mg, Oral, HS PRN x1 6/11   calcium carbonate, 500 mg, Oral, Daily PRN x1 6/12   levalbuterol, 0.31 mg, Nebulization, Q8H PRN  methocarbamol, 500 mg, Oral, Q6H PRN x1 6/11  phenol, 1 spray, Mouth/Throat, Q2H PRN  dextromethorphan-guaiFENesin (ROBITUSSIN DM) oral syrup 10 mL  Dose: 10 mL  Freq: Every 6 hours PRN Route: PO  PRN Reason: cough  Start: 06/11/24 1911 End: 06/12/24 0826 x1 6/11   butalbital-acetaminophen-caffeine (FIORICET,ESGIC) -40 mg per tablet 1 tablet  Dose: 1 tablet  Freq: Every 4 hours PRN Route: PO  PRN Reason: headaches  Start: 06/12/24 1000 x1 6/12  HYDROcodone Bit-Homatrop MBr (HYCODAN) oral syrup 5 mL  Dose: 5 mL  Freq: Every 4 hours PRN Route: PO  PRN Reason: cough  Start: 06/12/24 1004 x1 6/12     Reg diet    SCD   OOB as aiyana   O2 nc to keep sat at least 92 %    spirometry      Network Utilization Review Department  ATTENTION: Please call with any questions or concerns to [phone] and carefully listen to the prompts so that you are directed to the right person. All voicemails are confidential.   For Discharge needs, contact Care Management DC Support Team at [phone] opt. [phone]  Send all requests for admission clinical reviews, approved or denied determinations and any other requests to dedicated fax number below belonging to the campus where the patient is receiving treatment. List of dedicated fax numbers for the Facilities:  FACILITY NAME UR FAX NUMBER   ADMISSION DENIALS (Administrative/Medical Necessity) [phone]   DISCHARGE SUPPORT TEAM (NETWORK) [phone]   PARENT CHILD HEALTH (Maternity/NICU/Pediatrics) [phone]   [facility] [phone]   [facility] [phone]   [facility] [phone]   General acute hospital [phone]   [facility]  [phone]   [facility] [phone]   [facility] [phone]   [facility] [phone]   [facility] [phone]   [facility] [phone]   [facility] [phone]   [facility] [phone]

## 2024-06-12 NOTE — PROGRESS NOTES
[facility]  Progress Note  Name: Marci Lopez I  MRN: [id number]  Unit/Bed#: MS [id number] I Date of Admission: 6/11/2024   Date of Service: 6/12/2024 I Hospital Day: 1    Assessment & Plan   * Pneumonia  Assessment & Plan  Presented with worsening productive cough, dyspnea, fevers since May 22  Failed outpatient treatment with amoxicillin for 10 days and a 6-day prednisone  Followed by doxycycline for 10 days  Seen PCP today due to worsening symptoms  Unable to be weaned off of oxygen after nebulizer treatments and remained on 2.5 L.  Upon EMS arrival received magnesium, Solu-Medrol, and 3 albuterol nebulizer treatments.   CXR was unremarkable  CTA was negative for PE, diffuse tree-in-bud nodularity, most commonly seen in the setting of infection.  Procalcitonin negative, leukocytosis of 13.05, lactic acid 2.5 -> 2.2  S/p rocephin x1 and Azithromycin x1 in the ED  Urine Strep and Legionella negative  Sputum culture grew 1+ epithelial cells, 2+ polys, 1+ gram positive cocci in pairs, 1+ gram positive rods    Plan:  Stopped Rocephin and Azithromycin for now  Mucinex 600 mg   Hycodan ordered  Robaxin 500 mg for muscular CP  Incentive spirometry  Airway clearance protocol  Follow blood culture- no preliminary growth  Monitor O2 sat    SIRS (systemic inflammatory response syndrome) (HCC)  Assessment & Plan  SIRS criteria with leukocytosis, tachypnea and tachycardia  Likely in setting of pneumonia    See plan under pneumonia    Essential hypertension  Assessment & Plan  Continue home meds Amlodipine 10 mg and Toprol XL 50 mg    Diarrhea  Assessment & Plan  Chronic diarrhea due to malabsorption    Psoriasis  Assessment & Plan  Home medication Ultravate; use available conversion           VTE Pharmacologic Prophylaxis: VTE Score: 6 High Risk (Score >/= 5) - Pharmacological DVT Prophylaxis Ordered: enoxaparin (Lovenox). Sequential Compression Devices Ordered.    Mobility:   Basic Mobility  Inpatient Raw Score: 24  JH-HLM Goal: 8: Walk 250 feet or more  JH-HLM Achieved: 7: Walk 25 feet or more  JH-HLM Goal achieved. Continue to encourage appropriate mobility.    Patient Centered Rounds: I performed bedside rounds with nursing staff today.   Discussions with Specialists or Other Care Team Provider: none    Education and Discussions with Family / Patient: Updated  (son) via phone.    Total Time Spent on Date of Encounter in care of patient: 30 mins. This time was spent on one or more of the following: performing physical exam; counseling and coordination of care; obtaining or reviewing history; documenting in the medical record; reviewing/ordering tests, medications or procedures; communicating with other healthcare professionals and discussing with patient's family/caregivers.    Current Length of Stay: 1 day(s)  Current Patient Status: Inpatient   Certification Statement: The patient will continue to require additional inpatient hospital stay due to pending cultures, monitoring O2 sat  Discharge Plan: Anticipate discharge in 24-48 hrs to home.    Code Status: Level 1 - Full Code    Subjective:   No acute events overnight. Notes trouble sleeping d/t cough, expressed relief w/ O2. Had one episode of vomiting clear fluid that she attributes to nebulizer treatments. Difficulty eating due to nausea. Has a headache that began upon arrival to ED that began on the right side and now feels like pressure over the entire head. Feels somewhat SOB, denies diarrhea, last BM a few days ago. Notes abdominal and chest pain from coughing. Cough is no longer productive of sputum. Denies dizziness, palpitations.    Objective:     Vitals:   Temp (24hrs), Av.1 °F (36.7 °C), Min:97.7 °F (36.5 °C), Max:98.5 °F (36.9 °C)    Temp:  [97.7 °F (36.5 °C)-98.5 °F (36.9 °C)] (P) 97.7 °F (36.5 °C)  HR:  [83-97] (P) 83  Resp:  [16-22] (P) 20  BP: (114-141)/(53-87) (P) 116/66  SpO2:  [91 %-98 %] (P) 91 %  Body mass  index is 33.05 kg/m².     Input and Output Summary (last 24 hours):     Intake/Output Summary (Last 24 hours) at 6/12/2024 1550  Last data filed at 6/12/2024 1300  Gross per 24 hour   Intake 1330 ml   Output --   Net 1330 ml       Physical Exam:   Physical Exam  Vitals and nursing note reviewed.   Constitutional:       General: She is not in acute distress.     Appearance: She is well-developed. She is ill-appearing.   HENT:      Head: Normocephalic and atraumatic.   Eyes:      Conjunctiva/sclera: Conjunctivae normal.   Cardiovascular:      Rate and Rhythm: Normal rate and regular rhythm.      Heart sounds: No murmur heard.  Pulmonary:      Effort: Pulmonary effort is normal. No respiratory distress.      Breath sounds: Wheezing present.      Comments: Mild wheezing bilaterally in middle and lower lobes.  Abdominal:      General: Bowel sounds are normal.      Palpations: Abdomen is soft.      Tenderness: There is abdominal tenderness.      Comments: Tenderness in central abdomen.   Musculoskeletal:         General: No swelling.      Cervical back: Neck supple.   Skin:     General: Skin is warm and dry.      Capillary Refill: Capillary refill takes less than 2 seconds.   Neurological:      Mental Status: She is alert.   Psychiatric:         Mood and Affect: Mood normal.          Additional Data:     Labs:  Results from last 7 days   Lab Units 06/12/24  0512 06/11/24  1304   WBC Thousand/uL 13.41* 13.05*   HEMOGLOBIN g/dL 11.9 13.2   HEMATOCRIT % 37.0 41.7   PLATELETS Thousands/uL 489* 521*   SEGS PCT %  --  49   LYMPHO PCT %  --  37   MONO PCT %  --  6   EOS PCT %  --  6     Results from last 7 days   Lab Units 06/12/24  0512 06/11/24  1304   SODIUM mmol/L 137 141   POTASSIUM mmol/L 3.9 3.5   CHLORIDE mmol/L 106 106   CO2 mmol/L 24 25   BUN mg/dL 19 17   CREATININE mg/dL 0.72 0.78   ANION GAP mmol/L 7 10   CALCIUM mg/dL 9.6 9.4   ALBUMIN g/dL  --  4.1   TOTAL BILIRUBIN mg/dL  --  0.67   ALK PHOS U/L  --  85   ALT U/L   --  18   AST U/L  --  18   GLUCOSE RANDOM mg/dL 155* 129     Results from last 7 days   Lab Units 06/11/24  1304   INR  1.02             Results from last 7 days   Lab Units 06/12/24  0512 06/11/24  1515 06/11/24  1304   LACTIC ACID mmol/L  --  2.2* 2.5*   PROCALCITONIN ng/ml <0.05  --  <0.05       Lines/Drains:  Invasive Devices       Peripheral Intravenous Line  Duration             Peripheral IV 06/11/24 Dorsal (posterior);Right Hand 1 day    Peripheral IV 06/11/24 Left Antecubital 1 day                          Imaging: Reviewed radiology reports from this admission including: chest xray and CTA chest PE    Recent Cultures (last 7 days):   Results from last 7 days   Lab Units 06/11/24  2124 06/11/24  1840 06/11/24  1325 06/11/24  1304   BLOOD CULTURE   --   --  Received in Microbiology Lab. Culture in Progress. Received in Microbiology Lab. Culture in Progress.   SPUTUM CULTURE   --  Culture too young- will reincubate  --   --    GRAM STAIN RESULT   --  1+ Epithelial cells per low power field*  2+ Polys*  1+ Gram positive cocci in pairs*  1+ Gram positive rods*  --   --    LEGIONELLA URINARY ANTIGEN  Negative  --   --   --        Last 24 Hours Medication List:   Current Facility-Administered Medications   Medication Dose Route Frequency Provider Last Rate    albuterol  2 puff Inhalation Q4H PRN Kassie Horton MD      ALPRAZolam  0.25 mg Oral HS PRN Kassie Horton MD      amLODIPine  10 mg Oral Daily Kassie Horton MD      butalbital-acetaminophen-caffeine  1 tablet Oral Q4H PRN Noe Bell DO      calcium carbonate  500 mg Oral Daily PRN Eric Burks MD      clobetasol   Topical BID Kassie Horton MD      enoxaparin  40 mg Subcutaneous Daily Kassie Horton MD      fluticasone  1 spray Each Nare Daily Liv Berumen MD      guaiFENesin  600 mg Oral Q12H [facility] Kassie Horton MD      HYDROcodone Bit-Homatrop MBr  5  mL Oral Q4H PRN Noe Bell DO      levalbuterol  0.31 mg Nebulization Q8H PRN Liv Berumen MD      methocarbamol  500 mg Oral Q6H PRN Kassie Horton MD      metoprolol succinate  50 mg Oral Daily Kassie Horton MD      pantoprazole  40 mg Oral BID AC Liv Berumen MD      phenol  1 spray Mouth/Throat Q2H PRN Liv Berumen MD          Today, Patient Was Seen By: Noe Bell DO    **Please Note: This note may have been constructed using a voice recognition system.**

## 2024-06-13 LAB
BACTERIA SPT RESP CULT: ABNORMAL
BASOPHILS # BLD AUTO: 0.13 THOUSANDS/ÂΜL (ref 0–0.1)
BASOPHILS NFR BLD AUTO: 1 % (ref 0–1)
EOSINOPHIL # BLD AUTO: 0.74 THOUSAND/ÂΜL (ref 0–0.61)
EOSINOPHIL NFR BLD AUTO: 6 % (ref 0–6)
ERYTHROCYTE [DISTWIDTH] IN BLOOD BY AUTOMATED COUNT: 14.3 % (ref 11.6–15.1)
GRAM STN SPEC: ABNORMAL
HCT VFR BLD AUTO: 37.4 % (ref 34.8–46.1)
HGB BLD-MCNC: 11.9 G/DL (ref 11.5–15.4)
IMM GRANULOCYTES # BLD AUTO: 0.08 THOUSAND/UL (ref 0–0.2)
IMM GRANULOCYTES NFR BLD AUTO: 1 % (ref 0–2)
LYMPHOCYTES # BLD AUTO: 2.03 THOUSANDS/ÂΜL (ref 0.6–4.47)
LYMPHOCYTES NFR BLD AUTO: 15 % (ref 14–44)
MCH RBC QN AUTO: 27 PG (ref 26.8–34.3)
MCHC RBC AUTO-ENTMCNC: 31.8 G/DL (ref 31.4–37.4)
MCV RBC AUTO: 85 FL (ref 82–98)
MONOCYTES # BLD AUTO: 0.46 THOUSAND/ÂΜL (ref 0.17–1.22)
MONOCYTES NFR BLD AUTO: 3 % (ref 4–12)
NEUTROPHILS # BLD AUTO: 10.1 THOUSANDS/ÂΜL (ref 1.85–7.62)
NEUTS SEG NFR BLD AUTO: 74 % (ref 43–75)
NRBC BLD AUTO-RTO: 0 /100 WBCS
PLATELET # BLD AUTO: 425 THOUSANDS/UL (ref 149–390)
PMV BLD AUTO: 10.2 FL (ref 8.9–12.7)
RBC # BLD AUTO: 4.41 MILLION/UL (ref 3.81–5.12)
WBC # BLD AUTO: 13.54 THOUSAND/UL (ref 4.31–10.16)

## 2024-06-13 PROCEDURE — 94760 N-INVAS EAR/PLS OXIMETRY 1: CPT

## 2024-06-13 PROCEDURE — 94640 AIRWAY INHALATION TREATMENT: CPT

## 2024-06-13 PROCEDURE — 85025 COMPLETE CBC W/AUTO DIFF WBC: CPT | Performed by: INTERNAL MEDICINE

## 2024-06-13 PROCEDURE — 99223 1ST HOSP IP/OBS HIGH 75: CPT | Performed by: INTERNAL MEDICINE

## 2024-06-13 PROCEDURE — 99232 SBSQ HOSP IP/OBS MODERATE 35: CPT | Performed by: INTERNAL MEDICINE

## 2024-06-13 RX ORDER — GUAIFENESIN/DEXTROMETHORPHAN 100-10MG/5
10 SYRUP ORAL EVERY 4 HOURS PRN
Status: DISCONTINUED | OUTPATIENT
Start: 2024-06-13 | End: 2024-06-17 | Stop reason: HOSPADM

## 2024-06-13 RX ORDER — METHOCARBAMOL 750 MG/1
750 TABLET, FILM COATED ORAL EVERY 6 HOURS SCHEDULED
Status: DISCONTINUED | OUTPATIENT
Start: 2024-06-13 | End: 2024-06-17 | Stop reason: HOSPADM

## 2024-06-13 RX ORDER — BENZONATATE 100 MG/1
100 CAPSULE ORAL 3 TIMES DAILY
Status: DISCONTINUED | OUTPATIENT
Start: 2024-06-13 | End: 2024-06-17 | Stop reason: HOSPADM

## 2024-06-13 RX ORDER — LEVALBUTEROL INHALATION SOLUTION 1.25 MG/3ML
1.25 SOLUTION RESPIRATORY (INHALATION)
Status: DISCONTINUED | OUTPATIENT
Start: 2024-06-13 | End: 2024-06-16

## 2024-06-13 RX ORDER — BENZONATATE 100 MG/1
100 CAPSULE ORAL 3 TIMES DAILY PRN
Status: DISCONTINUED | OUTPATIENT
Start: 2024-06-13 | End: 2024-06-13

## 2024-06-13 RX ORDER — ALPRAZOLAM 0.25 MG/1
0.25 TABLET ORAL 4 TIMES DAILY PRN
Status: DISCONTINUED | OUTPATIENT
Start: 2024-06-13 | End: 2024-06-17 | Stop reason: HOSPADM

## 2024-06-13 RX ORDER — LEVALBUTEROL INHALATION SOLUTION 1.25 MG/3ML
1.25 SOLUTION RESPIRATORY (INHALATION) EVERY 6 HOURS PRN
Status: DISCONTINUED | OUTPATIENT
Start: 2024-06-13 | End: 2024-06-15

## 2024-06-13 RX ORDER — HYDROCODONE POLISTIREX AND CHLORPHENIRAMINE POLISTIREX 10; 8 MG/5ML; MG/5ML
5 SUSPENSION, EXTENDED RELEASE ORAL 2 TIMES DAILY
Status: COMPLETED | OUTPATIENT
Start: 2024-06-13 | End: 2024-06-16

## 2024-06-13 RX ORDER — ALPRAZOLAM 0.25 MG/1
0.25 TABLET ORAL ONCE
Status: COMPLETED | OUTPATIENT
Start: 2024-06-13 | End: 2024-06-13

## 2024-06-13 RX ORDER — FLUTICASONE FUROATE AND VILANTEROL 200; 25 UG/1; UG/1
1 POWDER RESPIRATORY (INHALATION) DAILY
Status: DISCONTINUED | OUTPATIENT
Start: 2024-06-13 | End: 2024-06-17 | Stop reason: HOSPADM

## 2024-06-13 RX ORDER — DIAZEPAM 5 MG/1
5 TABLET ORAL
Status: CANCELLED | OUTPATIENT
Start: 2024-06-13

## 2024-06-13 RX ORDER — PREDNISONE 20 MG/1
40 TABLET ORAL DAILY
Status: DISCONTINUED | OUTPATIENT
Start: 2024-06-13 | End: 2024-06-13

## 2024-06-13 RX ORDER — GUAIFENESIN 600 MG/1
1200 TABLET, EXTENDED RELEASE ORAL EVERY 12 HOURS SCHEDULED
Status: DISCONTINUED | OUTPATIENT
Start: 2024-06-13 | End: 2024-06-17 | Stop reason: HOSPADM

## 2024-06-13 RX ORDER — GUAIFENESIN 600 MG/1
600 TABLET, EXTENDED RELEASE ORAL ONCE
Status: COMPLETED | OUTPATIENT
Start: 2024-06-13 | End: 2024-06-13

## 2024-06-13 RX ORDER — METHYLPREDNISOLONE SODIUM SUCCINATE 40 MG/ML
40 INJECTION, POWDER, LYOPHILIZED, FOR SOLUTION INTRAMUSCULAR; INTRAVENOUS EVERY 8 HOURS SCHEDULED
Status: DISCONTINUED | OUTPATIENT
Start: 2024-06-13 | End: 2024-06-14

## 2024-06-13 RX ADMIN — FLUTICASONE FUROATE AND VILANTEROL TRIFENATATE 1 PUFF: 200; 25 POWDER RESPIRATORY (INHALATION) at 16:24

## 2024-06-13 RX ADMIN — LEVALBUTEROL HYDROCHLORIDE 1.25 MG: 1.25 SOLUTION RESPIRATORY (INHALATION) at 03:13

## 2024-06-13 RX ADMIN — BENZONATATE 100 MG: 100 CAPSULE ORAL at 02:27

## 2024-06-13 RX ADMIN — DICLOFENAC SODIUM TOPICAL GEL, 1% 2 G: 10 GEL TOPICAL at 05:51

## 2024-06-13 RX ADMIN — GUAIFENESIN 1200 MG: 600 TABLET ORAL at 09:39

## 2024-06-13 RX ADMIN — IPRATROPIUM BROMIDE 0.5 MG: 0.5 SOLUTION RESPIRATORY (INHALATION) at 15:24

## 2024-06-13 RX ADMIN — PANTOPRAZOLE SODIUM 40 MG: 20 TABLET, DELAYED RELEASE ORAL at 04:38

## 2024-06-13 RX ADMIN — FLUTICASONE PROPIONATE 1 SPRAY: 50 SPRAY, METERED NASAL at 09:45

## 2024-06-13 RX ADMIN — GUAIFENESIN 600 MG: 600 TABLET ORAL at 04:38

## 2024-06-13 RX ADMIN — DICLOFENAC SODIUM TOPICAL GEL, 1% 2 G: 10 GEL TOPICAL at 22:46

## 2024-06-13 RX ADMIN — IPRATROPIUM BROMIDE 0.5 MG: 0.5 SOLUTION RESPIRATORY (INHALATION) at 19:34

## 2024-06-13 RX ADMIN — LEVALBUTEROL HYDROCHLORIDE 1.25 MG: 1.25 SOLUTION RESPIRATORY (INHALATION) at 19:34

## 2024-06-13 RX ADMIN — PREDNISONE 40 MG: 20 TABLET ORAL at 11:48

## 2024-06-13 RX ADMIN — BENZONATATE 100 MG: 100 CAPSULE ORAL at 22:46

## 2024-06-13 RX ADMIN — DICLOFENAC SODIUM TOPICAL GEL, 1% 2 G: 10 GEL TOPICAL at 17:05

## 2024-06-13 RX ADMIN — CLOBETASOL PROPIONATE: 0.5 CREAM TOPICAL at 17:05

## 2024-06-13 RX ADMIN — METHOCARBAMOL TABLETS 750 MG: 750 TABLET, COATED ORAL at 17:05

## 2024-06-13 RX ADMIN — Medication 5 ML: at 00:40

## 2024-06-13 RX ADMIN — GUAIFENESIN 1200 MG: 600 TABLET ORAL at 23:27

## 2024-06-13 RX ADMIN — BENZONATATE 100 MG: 100 CAPSULE ORAL at 16:22

## 2024-06-13 RX ADMIN — Medication 5 ML: at 22:46

## 2024-06-13 RX ADMIN — METHOCARBAMOL TABLETS 750 MG: 750 TABLET, COATED ORAL at 23:26

## 2024-06-13 RX ADMIN — ALPRAZOLAM 0.25 MG: 0.25 TABLET ORAL at 22:46

## 2024-06-13 RX ADMIN — METHOCARBAMOL TABLETS 500 MG: 500 TABLET, COATED ORAL at 01:20

## 2024-06-13 RX ADMIN — HYDROCODONE POLISTIREX AND CHLORPHENIRAMINE POLISTIREX 5 ML: 10; 8 SUSPENSION, EXTENDED RELEASE ORAL at 17:05

## 2024-06-13 RX ADMIN — Medication 5 ML: at 11:33

## 2024-06-13 RX ADMIN — METHYLPREDNISOLONE SODIUM SUCCINATE 40 MG: 40 INJECTION, POWDER, FOR SOLUTION INTRAMUSCULAR; INTRAVENOUS at 16:21

## 2024-06-13 RX ADMIN — LEVALBUTEROL HYDROCHLORIDE 1.25 MG: 1.25 SOLUTION RESPIRATORY (INHALATION) at 15:24

## 2024-06-13 RX ADMIN — METOPROLOL SUCCINATE 50 MG: 50 TABLET, EXTENDED RELEASE ORAL at 09:40

## 2024-06-13 RX ADMIN — Medication 5 ML: at 04:38

## 2024-06-13 RX ADMIN — METHYLPREDNISOLONE SODIUM SUCCINATE 40 MG: 40 INJECTION, POWDER, FOR SOLUTION INTRAMUSCULAR; INTRAVENOUS at 23:25

## 2024-06-13 RX ADMIN — DICLOFENAC SODIUM TOPICAL GEL, 1% 2 G: 10 GEL TOPICAL at 09:45

## 2024-06-13 RX ADMIN — METHOCARBAMOL TABLETS 750 MG: 750 TABLET, COATED ORAL at 11:48

## 2024-06-13 RX ADMIN — GUAIFENESIN AND DEXTROMETHORPHAN 10 ML: 100; 10 SYRUP ORAL at 22:46

## 2024-06-13 RX ADMIN — AMLODIPINE BESYLATE 10 MG: 10 TABLET ORAL at 09:39

## 2024-06-13 RX ADMIN — ALPRAZOLAM 0.25 MG: 0.25 TABLET ORAL at 05:40

## 2024-06-13 RX ADMIN — DICLOFENAC SODIUM TOPICAL GEL, 1% 2 G: 10 GEL TOPICAL at 12:09

## 2024-06-13 RX ADMIN — PANTOPRAZOLE SODIUM 40 MG: 20 TABLET, DELAYED RELEASE ORAL at 16:21

## 2024-06-13 NOTE — CASE MANAGEMENT
Case Management Progress Note    Patient name Marci Lopez  Location W /W -01 MRN [id number]  : 1970 Date 2024       LOS (days): 2  Geometric Mean LOS (GMLOS) (days):   Days to GMLOS:        OBJECTIVE:        Current admission status: Inpatient  Preferred Pharmacy:   [id number] [place] [address] [address]  Phone: [phone] Fax: [phone]    Primary Care Provider: Farhana Tompkins DO    Primary Insurance: [employer]  Secondary Insurance:     PROGRESS NOTE:  CM received consult for YRIS/OUD. CM contacted [facility]  to refer patient and provided minimal necessary information. CRS to meet with patient and follow up with CM to provide update on plan of care following patient connection.     Per Yoselin with [facility], patient is not interested in tx right now-- but has their contact info as needed.

## 2024-06-13 NOTE — CONSULTS
Consultation - Pulmonary Medicine   Marci Lopez 54 y.o. female MRN: [id number]  Unit/Bed#: W -01 Encounter: [id number]      Assessment:  Acute hypoxic respiratory failure likely due to VQ mismatch and from bronchospasm  Asthmatic reaction to pneumonia  Probable resolving pneumonia    Plan:   Patient is received 2 courses of antibiotics Pro-Atif is negative unlikely this to be a multidrug-resistant organism.  Agree with sputum culture but no need for antibiotics at this time RP 2 was negative  Differential diagnosis is asthmatic reaction to pneumonia versus vaping related lung injury  Initiate IV steroids at 40 mg IV Q8 initiate Xopenex and Atrovent 3 times daily.  To help with cough we will make Tessalon Perles around-the-clock and Tussionex twice daily.  Continue guaifenesin 1200 twice daily  Will start Breo 200, 1 puff daily for asthmatic reaction to pneumonia we will continue this on discharge for 3 months before considering de-escalation    History of Present Illness   Physician Requesting Consult: Jeovanny Sutton MD  Reason for Consult / Principal Problem: Shortness of breath and hypoxia    HPI: Marci Lopez is a 54 y.o. year old female who presents with refractory cough wheeze and shortness of breath.  Patient's problems started when she was in [facility] about 3 weeks ago she contracted upper respiratory infection from a friend of hers.  She also vaped at the time for the first time and developed a sore throat afterwards.  Her sore throat developed into cough productive of green mucus wheezing.  During the course of her illness she has been in contact with her physician multiple times given 2 courses of prednisone Augmentin and doxycycline.  She came to the hospital for hypoxia wheezing and shortness of breath.  She currently still feels short of breath coughing wheezing slightly improved since admission.  She never had any symptoms of respiratory disease although she is been a smoker up until last  year.  She is never been on inhalers or had bronchitis or pneumonia.    Inpatient consult to Pulmonology  Consult performed by: Brandy Dahl DO  Consult ordered by: Noe Bell DO          Review of Systems   Constitutional:  Positive for fatigue.   Respiratory:  Positive for cough, shortness of breath and wheezing.    Neurological:  Positive for headaches.       Historical Information   Past Medical History:   Diagnosis Date    Allergic rhinitis     Anxiety     Colon polyp     Disease of thyroid gland     Endometriosis     Fibroid     Hypertension     Kidney stones     left side    Migraine     teenager     Past Surgical History:   Procedure Laterality Date    ABDOMINAL SURGERY  2023    tummy tuck hernia repair    ABDOMINOPLASTY      APPENDECTOMY      CHOLECYSTECTOMY      FOOT FRACTURE SURGERY      HERNIA REPAIR      umbilical x 3 last one 2023    KNEE CARTILAGE SURGERY Right     and ACL    LAPAROSCOPIC GASTRIC BANDING      removed    OVARIAN CYST SURGERY      SHOULDER SURGERY Right     x 3    STOMACH SURGERY  2023    tummy tuck     Social History   Social History     Substance and Sexual Activity   Alcohol Use Not Currently     Social History     Substance and Sexual Activity   Drug Use Not Currently    Types: Marijuana    Comment: gummies to sleep for aniety and pain     E-Cigarette/Vaping    E-Cigarette Use Never User      E-Cigarette/Vaping Substances    Nicotine No     THC No     CBD No     Flavoring No     Other No     Unknown No      Social History     Tobacco Use   Smoking Status Former    Current packs/day: 0.00    Average packs/day: 0.3 packs/day for 44.0 years (11.0 ttl pk-yrs)    Types: Cigarettes    Start date: 6/15/1984    Quit date: 2023    Years since quittin.1    Passive exposure: Past   Smokeless Tobacco Never     Occupational History: Not applicable    Family History: non-contributory    Meds/Allergies   all current active meds have been reviewed    Allergies   Allergen  "Reactions    Adhesive [Medical Tape] Rash     welts and burns    Betadine [Povidone Iodine] Rash     Burn and swelling    Macrobid [Nitrofurantoin] Throat Swelling       Objective   Vitals: Blood pressure 139/60, pulse 62, temperature 98.2 °F (36.8 °C), resp. rate 17, height 5' 5\" (1.651 m), weight 90.1 kg (198 lb 10.2 oz), SpO2 97%.,Body mass index is 33.05 kg/m².    Intake/Output Summary (Last 24 hours) at 6/13/2024 1354  Last data filed at 6/13/2024 1100  Gross per 24 hour   Intake 640 ml   Output --   Net 640 ml     Invasive Devices       Peripheral Intravenous Line  Duration             Peripheral IV 06/11/24 Dorsal (posterior);Right Hand 2 days    Peripheral IV 06/11/24 Left Antecubital 2 days                    Physical Exam  Constitutional:       Appearance: She is well-developed.   HENT:      Head: Normocephalic and atraumatic.   Eyes:      Pupils: Pupils are equal, round, and reactive to light.   Cardiovascular:      Rate and Rhythm: Normal rate and regular rhythm.      Heart sounds: No murmur heard.  Pulmonary:      Effort: Pulmonary effort is normal. No respiratory distress.      Breath sounds: Wheezing and rhonchi present. No rales.   Abdominal:      Palpations: Abdomen is soft.   Musculoskeletal:      Cervical back: Normal range of motion and neck supple.   Skin:     General: Skin is warm and dry.   Neurological:      Mental Status: She is alert and oriented to person, place, and time.         Lab Results: ABG: No results found for: \"PHART\", \"UPH2DWX\", \"PO2ART\", \"JRH4YPP\", \"H3QXIQTJ\", \"BEART\", \"SOURCE\", CBC:   Lab Results   Component Value Date    WBC 13.54 (H) 06/13/2024    HGB 11.9 06/13/2024    HCT 37.4 06/13/2024    MCV 85 06/13/2024     (H) 06/13/2024    RBC 4.41 06/13/2024    MCH 27.0 06/13/2024    MCHC 31.8 06/13/2024    RDW 14.3 06/13/2024    MPV 10.2 06/13/2024    NRBC 0 06/13/2024   , CMP: No results found for: \"SODIUM\", \"K\", \"CL\", \"CO2\", \"ANIONGAP\", \"BUN\", \"CREATININE\", \"GLUCOSE\", " "\"CALCIUM\", \"AST\", \"ALT\", \"ALKPHOS\", \"PROT\", \"BILITOT\", \"EGFR\"  Imaging Studies: I have personally reviewed pertinent films in PACS  EKG, Pathology, and Other Studies: I have personally reviewed pertinent films in PACS  VTE Prophylaxis: Enoxaparin (Lovenox)    Code Status: Level 1 - Full Code  Advance Directive and Living Will:      Power of :    POLST:      None  "

## 2024-06-13 NOTE — ASSESSMENT & PLAN NOTE
Presented with worsening productive cough, dyspnea, fevers since May 22  Failed outpatient treatment with amoxicillin for 10 days and a 6-day prednisone  Followed by doxycycline for 10 days  Seen PCP today due to worsening symptoms  Unable to be weaned off of oxygen after nebulizer treatments and remained on 2.5 L.  Upon EMS arrival received magnesium, Solu-Medrol, and 3 albuterol nebulizer treatments.   CXR was unremarkable  CTA was negative for PE, diffuse tree-in-bud nodularity, most commonly seen in the setting of infection.  Procalcitonin negative, leukocytosis of 13.05, lactic acid 2.5 -> 2.2  S/p rocephin x1 and Azithromycin x1 in the ED  Urine Strep and Legionella negative  Sputum culture grew 1+ epithelial cells, 2+ polys, 1+ gram positive cocci in pairs, 1+ gram positive rods  Xopenex 1.25 O/N    Plan:  Stopped Rocephin and Azithromycin for now  Mucinex, Robaxin, Voltaren gel  Albuterol as needed  Incentive spirometry  Airway clearance protocol  Follow blood culture- no preliminary growth  Sputum cx unrevealing  Monitor O2 sat  Pulm consult: orders placed, breo begun x 3 months

## 2024-06-13 NOTE — PROGRESS NOTES
[facility]  Progress Note  Name: Marci Lopez I  MRN: [id number]  Unit/Bed#: W -01 I Date of Admission: 6/11/2024   Date of Service: 6/13/2024 I Hospital Day: 2    Assessment & Plan   * Pneumonia  Assessment & Plan  Presented with worsening productive cough, dyspnea, fevers since May 22  Failed outpatient treatment with amoxicillin for 10 days and a 6-day prednisone  Followed by doxycycline for 10 days  Seen PCP today due to worsening symptoms  Unable to be weaned off of oxygen after nebulizer treatments and remained on 2.5 L.  Upon EMS arrival received magnesium, Solu-Medrol, and 3 albuterol nebulizer treatments.   CXR was unremarkable  CTA was negative for PE, diffuse tree-in-bud nodularity, most commonly seen in the setting of infection.  Procalcitonin negative, leukocytosis of 13.05, lactic acid 2.5 -> 2.2  S/p rocephin x1 and Azithromycin x1 in the ED  Urine Strep and Legionella negative  Sputum culture grew 1+ epithelial cells, 2+ polys, 1+ gram positive cocci in pairs, 1+ gram positive rods  Xopenex 1.25 O/N    Plan:  Stopped Rocephin and Azithromycin for now  Mucinex, Robaxin, Voltaren gel  Albuterol as needed  Incentive spirometry  Airway clearance protocol  Follow blood culture- no preliminary growth  Sputum cx unrevealing  Monitor O2 sat  Pulm consult: orders placed, breo begun x 3 months    SIRS (systemic inflammatory response syndrome) (HCC)  Assessment & Plan  SIRS criteria with leukocytosis, tachypnea and tachycardia  Likely in setting of pneumonia    See plan under pneumonia    Essential hypertension  Assessment & Plan  Continue home meds Amlodipine 10 mg and Toprol XL 50 mg    Diarrhea  Assessment & Plan  Chronic diarrhea due to malabsorption    Psoriasis  Assessment & Plan  Home medication Ultravate; use available conversion         VTE Pharmacologic Prophylaxis: VTE Score: 6 High Risk (Score >/= 5) - Pharmacological DVT Prophylaxis Ordered: enoxaparin  (Lovenox). Sequential Compression Devices Ordered.    Mobility:   Basic Mobility Inpatient Raw Score: 24  JH-HLM Goal: 8: Walk 250 feet or more  JH-HLM Achieved: 6: Walk 10 steps or more  JH-HLM Goal achieved. Continue to encourage appropriate mobility.    Patient Centered Rounds: I performed bedside rounds with nursing staff today.   Discussions with Specialists or Other Care Team Provider: Pulmonology    Education and Discussions with Family / Patient: Attempted to update  (son) via phone. Left voicemail.     Total Time Spent on Date of Encounter in care of patient: 45 mins. This time was spent on one or more of the following: performing physical exam; counseling and coordination of care; obtaining or reviewing history; documenting in the medical record; reviewing/ordering tests, medications or procedures; communicating with other healthcare professionals and discussing with patient's family/caregivers.    Current Length of Stay: 2 day(s)  Current Patient Status: Inpatient   Certification Statement: The patient will continue to require additional inpatient hospital stay due to pending pulm consult  Discharge Plan: Anticipate discharge in 24-48 hrs to home.    Code Status: Level 1 - Full Code    Subjective:   No acute events overnight. Notes trouble sleeping d/t cough, expressed relief w/ O2 and nebulizer treatments. Cough is back to being productive of yellow/gray sputum. Had several episodes of post-tussive vomiting yesterday. Difficulty eating due to nausea, feels somewhat lightheaded from coughing. Notes abd & chest pain d/t coughing. Feels somewhat SOB. Denies dizziness, palpitations, vision changes, swelling.     Objective:     Vitals:   Temp (24hrs), Av.9 °F (36.6 °C), Min:97.7 °F (36.5 °C), Max:98.2 °F (36.8 °C)    Temp:  [97.7 °F (36.5 °C)-98.2 °F (36.8 °C)] 98.1 °F (36.7 °C)  HR:  [62-83] 65  Resp:  [17-20] 17  BP: (112-139)/(60-72) 112/71  SpO2:  [91 %-97 %] 94 %  Body mass index is  33.05 kg/m².     Input and Output Summary (last 24 hours):     Intake/Output Summary (Last 24 hours) at 6/13/2024 1503  Last data filed at 6/13/2024 1100  Gross per 24 hour   Intake 640 ml   Output --   Net 640 ml       Physical Exam:   Physical Exam  Vitals and nursing note reviewed.   Constitutional:       General: She is not in acute distress.     Appearance: She is well-developed.   HENT:      Head: Normocephalic and atraumatic.      Mouth/Throat:      Mouth: Mucous membranes are moist.      Pharynx: No posterior oropharyngeal erythema.   Eyes:      Conjunctiva/sclera: Conjunctivae normal.   Cardiovascular:      Rate and Rhythm: Normal rate and regular rhythm.      Heart sounds: No murmur heard.  Pulmonary:      Effort: Pulmonary effort is normal. No respiratory distress.      Breath sounds: Wheezing present.      Comments: Wheezing in all fields bilaterally  Chest:      Chest wall: Tenderness present.   Abdominal:      General: There is no distension.      Palpations: Abdomen is soft.      Tenderness: There is abdominal tenderness. There is no guarding.   Musculoskeletal:         General: Tenderness present. No swelling.      Cervical back: Neck supple.   Skin:     General: Skin is warm and dry.      Capillary Refill: Capillary refill takes less than 2 seconds.   Neurological:      Mental Status: She is alert.   Psychiatric:         Mood and Affect: Mood normal.          Additional Data:     Labs:  Results from last 7 days   Lab Units 06/13/24  1331   WBC Thousand/uL 13.54*   HEMOGLOBIN g/dL 11.9   HEMATOCRIT % 37.4   PLATELETS Thousands/uL 425*   SEGS PCT % 74   LYMPHO PCT % 15   MONO PCT % 3*   EOS PCT % 6     Results from last 7 days   Lab Units 06/12/24  0512 06/11/24  1304   SODIUM mmol/L 137 141   POTASSIUM mmol/L 3.9 3.5   CHLORIDE mmol/L 106 106   CO2 mmol/L 24 25   BUN mg/dL 19 17   CREATININE mg/dL 0.72 0.78   ANION GAP mmol/L 7 10   CALCIUM mg/dL 9.6 9.4   ALBUMIN g/dL  --  4.1   TOTAL BILIRUBIN  mg/dL  --  0.67   ALK PHOS U/L  --  85   ALT U/L  --  18   AST U/L  --  18   GLUCOSE RANDOM mg/dL 155* 129     Results from last 7 days   Lab Units 06/11/24  1304   INR  1.02             Results from last 7 days   Lab Units 06/12/24  0512 06/11/24  1515 06/11/24  1304   LACTIC ACID mmol/L  --  2.2* 2.5*   PROCALCITONIN ng/ml <0.05  --  <0.05       Lines/Drains:  Invasive Devices       Peripheral Intravenous Line  Duration             Peripheral IV 06/11/24 Dorsal (posterior);Right Hand 2 days    Peripheral IV 06/11/24 Left Antecubital 2 days                          Imaging: Reviewed radiology reports from this admission including: chest xray and CTA Chest PE    Recent Cultures (last 7 days):   Results from last 7 days   Lab Units 06/11/24  2124 06/11/24  1840 06/11/24  1325 06/11/24  1304   BLOOD CULTURE   --   --  No Growth at 24 hrs. No Growth at 24 hrs.   SPUTUM CULTURE   --  2+ Growth of  --   --    GRAM STAIN RESULT   --  1+ Epithelial cells per low power field*  2+ Polys*  1+ Gram positive cocci in pairs*  1+ Gram positive rods*  --   --    LEGIONELLA URINARY ANTIGEN  Negative  --   --   --        Last 24 Hours Medication List:   Current Facility-Administered Medications   Medication Dose Route Frequency Provider Last Rate    albuterol  2 puff Inhalation Q4H PRN Kassie Horton MD      ALPRAZolam  0.25 mg Oral 4x Daily PRN Noe Bell, DO      amLODIPine  10 mg Oral Daily Kassie Horton MD      benzonatate  100 mg Oral TID Brandy Dahl,       butalbital-acetaminophen-caffeine  1 tablet Oral Q4H PRN Noe Bell, DO      calcium carbonate  500 mg Oral Daily PRN Eric Burks MD      clobetasol   Topical BID Kassie Horton MD      dextromethorphan-guaiFENesin  10 mL Oral Q4H PRN Noe Bell, DO      Diclofenac Sodium  2 g Topical 4x Daily Eric Burks MD      enoxaparin  40 mg Subcutaneous Daily Kassie Horton MD      fluticasone  1 spray Each Nare Daily [facility]  Dmitriy Berumen MD      fluticasone-vilanterol  1 puff Inhalation Daily Brandy Dahl DO      guaiFENesin  1,200 mg Oral Q12H [facility] Eric Burks MD      Hydrocod Wes-Chlorphe Wes ER  5 mL Oral BID Brandy Dahl DO      HYDROcodone Bit-Homatrop MBr  5 mL Oral Q4H PRN Noe Bell DO      ipratropium  0.5 mg Nebulization TID Brandy Dahl DO      levalbuterol  1.25 mg Nebulization Q6H PRN Eric Burks MD      levalbuterol  1.25 mg Nebulization TID Brandy Dahl DO      methocarbamol  750 mg Oral Q6H [facility] Noe Bell DO      methylPREDNISolone sodium succinate  40 mg Intravenous Q8H YOLY Brandy Dahl,       metoprolol succinate  50 mg Oral Daily Kassie Horton MD      pantoprazole  40 mg Oral BID AC Liv Berumen MD      phenol  1 spray Mouth/Throat Q2H PRN Liv Berumen MD          Today, Patient Was Seen By: Noe Bell DO    **Please Note: This note may have been constructed using a voice recognition system.**

## 2024-06-13 NOTE — PLAN OF CARE
Problem: PAIN - ADULT  Goal: Verbalizes/displays adequate comfort level or baseline comfort level  Description: Interventions:  - Encourage patient to monitor pain and request assistance  - Assess pain using appropriate pain scale  - Administer analgesics based on type and severity of pain and evaluate response  - Implement non-pharmacological measures as appropriate and evaluate response  - Consider cultural and social influences on pain and pain management  - Notify physician/advanced practitioner if interventions unsuccessful or patient reports new pain  Outcome: Progressing     Problem: INFECTION - ADULT  Goal: Absence or prevention of progression during hospitalization  Description: INTERVENTIONS:  - Assess and monitor for signs and symptoms of infection  - Monitor lab/diagnostic results  - Monitor all insertion sites, i.e. indwelling lines, tubes, and drains  - Monitor endotracheal if appropriate and nasal secretions for changes in amount and color  - [place] appropriate cooling/warming therapies per order  - Administer medications as ordered  - Instruct and encourage patient and family to use good hand hygiene technique  - Identify and instruct in appropriate isolation precautions for identified infection/condition  Outcome: Progressing  Goal: Absence of fever/infection during neutropenic period  Description: INTERVENTIONS:  - Monitor WBC    Outcome: Progressing     Problem: SAFETY ADULT  Goal: Patient will remain free of falls  Description: INTERVENTIONS:  - Educate patient/family on patient safety including physical limitations  - Instruct patient to call for assistance with activity   - Consult OT/PT to assist with strengthening/mobility   - Keep Call bell within reach  - Keep bed low and locked with side rails adjusted as appropriate  - Keep care items and personal belongings within reach  - Initiate and maintain comfort rounds  - Make Fall Risk Sign visible to staff  - Offer Toileting every  Hours,  in advance of need  - Initiate/Maintain alarm  - Obtain necessary fall risk management equipment:   - Apply yellow socks and bracelet for high fall risk patients  - Consider moving patient to room near nurses station  Outcome: Progressing  Goal: Maintain or return to baseline ADL function  Description: INTERVENTIONS:  -  Assess patient's ability to carry out ADLs; assess patient's baseline for ADL function and identify physical deficits which impact ability to perform ADLs (bathing, care of mouth/teeth, toileting, grooming, dressing, etc.)  - Assess/evaluate cause of self-care deficits   - Assess range of motion  - Assess patient's mobility; develop plan if impaired  - Assess patient's need for assistive devices and provide as appropriate  - Encourage maximum independence but intervene and supervise when necessary  - Involve family in performance of ADLs  - Assess for home care needs following discharge   - Consider OT consult to assist with ADL evaluation and planning for discharge  - Provide patient education as appropriate  Outcome: Progressing  Goal: Maintains/Returns to pre admission functional level  Description: INTERVENTIONS:  - Perform AM-PAC 6 Click Basic Mobility/ Daily Activity assessment daily.  - Set and communicate daily mobility goal to care team and patient/family/caregiver.   - Collaborate with rehabilitation services on mobility goals if consulted  - Perform Range of Motion  times a day.  - Reposition patient every  hours.  - Dangle patient  times a day  - Stand patient  times a day  - Ambulate patient  times a day  - Out of bed to chair  times a day   - Out of bed for meals  times a day  - Out of bed for toileting  - Record patient progress and toleration of activity level   Outcome: Progressing     Problem: DISCHARGE PLANNING  Goal: Discharge to home or other facility with appropriate resources  Description: INTERVENTIONS:  - Identify barriers to discharge w/patient and caregiver  - Arrange for  needed discharge resources and transportation as appropriate  - Identify discharge learning needs (meds, wound care, etc.)  - Arrange for interpretive services to assist at discharge as needed  - Refer to Case Management Department for coordinating discharge planning if the patient needs post-hospital services based on physician/advanced practitioner order or complex needs related to functional status, cognitive ability, or social support system  Outcome: Progressing     Problem: Knowledge Deficit  Goal: Patient/family/caregiver demonstrates understanding of disease process, treatment plan, medications, and discharge instructions  Description: Complete learning assessment and assess knowledge base.  Interventions:  - Provide teaching at level of understanding  - Provide teaching via preferred learning methods  Outcome: Progressing

## 2024-06-13 NOTE — QUICK NOTE
Called by nurse at bedside around 7:30 PM as the patient was feeling worse complaining of increased wheezing and coughing with increased thick sputum.  I assessed the patient at bedside.  Patient was coughing and on exam diffuse wheezing in both bilateral lung bases and middle lobes.  She was concerned that she has pneumonia and her IV antibiotics were stopped.    Plan:  1.  Give Xopenex.  Increase her Xopenex to 1.25 instead of 0.31 mg.  2.  Give Hycodan now.   3.  Respiratory protocol.  4.  Reassured patient that all her labs and imaging showed no signs of pneumonia.

## 2024-06-14 PROBLEM — R05.8 CHESTY COUGH: Status: ACTIVE | Noted: 2024-06-11

## 2024-06-14 PROCEDURE — 99232 SBSQ HOSP IP/OBS MODERATE 35: CPT | Performed by: INTERNAL MEDICINE

## 2024-06-14 PROCEDURE — 94664 DEMO&/EVAL PT USE INHALER: CPT

## 2024-06-14 PROCEDURE — 94760 N-INVAS EAR/PLS OXIMETRY 1: CPT

## 2024-06-14 PROCEDURE — 94640 AIRWAY INHALATION TREATMENT: CPT

## 2024-06-14 RX ORDER — METHYLPREDNISOLONE SODIUM SUCCINATE 40 MG/ML
40 INJECTION, POWDER, LYOPHILIZED, FOR SOLUTION INTRAMUSCULAR; INTRAVENOUS EVERY 12 HOURS SCHEDULED
Status: DISCONTINUED | OUTPATIENT
Start: 2024-06-14 | End: 2024-06-15

## 2024-06-14 RX ADMIN — METHOCARBAMOL TABLETS 750 MG: 750 TABLET, COATED ORAL at 06:11

## 2024-06-14 RX ADMIN — GUAIFENESIN AND DEXTROMETHORPHAN 10 ML: 100; 10 SYRUP ORAL at 09:04

## 2024-06-14 RX ADMIN — FLUTICASONE PROPIONATE 1 SPRAY: 50 SPRAY, METERED NASAL at 08:58

## 2024-06-14 RX ADMIN — HYDROCODONE POLISTIREX AND CHLORPHENIRAMINE POLISTIREX 5 ML: 10; 8 SUSPENSION, EXTENDED RELEASE ORAL at 19:37

## 2024-06-14 RX ADMIN — CLOBETASOL PROPIONATE: 0.5 CREAM TOPICAL at 08:58

## 2024-06-14 RX ADMIN — CLOBETASOL PROPIONATE: 0.5 CREAM TOPICAL at 19:37

## 2024-06-14 RX ADMIN — METHOCARBAMOL TABLETS 750 MG: 750 TABLET, COATED ORAL at 19:37

## 2024-06-14 RX ADMIN — ALPRAZOLAM 0.25 MG: 0.25 TABLET ORAL at 09:05

## 2024-06-14 RX ADMIN — LEVALBUTEROL HYDROCHLORIDE 1.25 MG: 1.25 SOLUTION RESPIRATORY (INHALATION) at 13:30

## 2024-06-14 RX ADMIN — Medication 5 ML: at 15:31

## 2024-06-14 RX ADMIN — BENZONATATE 100 MG: 100 CAPSULE ORAL at 15:31

## 2024-06-14 RX ADMIN — METOPROLOL SUCCINATE 50 MG: 50 TABLET, EXTENDED RELEASE ORAL at 08:58

## 2024-06-14 RX ADMIN — DICLOFENAC SODIUM TOPICAL GEL, 1% 2 G: 10 GEL TOPICAL at 21:52

## 2024-06-14 RX ADMIN — GUAIFENESIN 1200 MG: 600 TABLET ORAL at 08:58

## 2024-06-14 RX ADMIN — FLUTICASONE FUROATE AND VILANTEROL TRIFENATATE 1 PUFF: 200; 25 POWDER RESPIRATORY (INHALATION) at 08:58

## 2024-06-14 RX ADMIN — DICLOFENAC SODIUM TOPICAL GEL, 1% 2 G: 10 GEL TOPICAL at 19:37

## 2024-06-14 RX ADMIN — PANTOPRAZOLE SODIUM 40 MG: 20 TABLET, DELAYED RELEASE ORAL at 06:11

## 2024-06-14 RX ADMIN — BENZONATATE 100 MG: 100 CAPSULE ORAL at 08:58

## 2024-06-14 RX ADMIN — METHYLPREDNISOLONE SODIUM SUCCINATE 40 MG: 40 INJECTION, POWDER, FOR SOLUTION INTRAMUSCULAR; INTRAVENOUS at 05:34

## 2024-06-14 RX ADMIN — IPRATROPIUM BROMIDE 0.5 MG: 0.5 SOLUTION RESPIRATORY (INHALATION) at 07:18

## 2024-06-14 RX ADMIN — Medication 5 ML: at 06:18

## 2024-06-14 RX ADMIN — GUAIFENESIN 1200 MG: 600 TABLET ORAL at 21:51

## 2024-06-14 RX ADMIN — DICLOFENAC SODIUM TOPICAL GEL, 1% 2 G: 10 GEL TOPICAL at 08:58

## 2024-06-14 RX ADMIN — BENZONATATE 100 MG: 100 CAPSULE ORAL at 21:51

## 2024-06-14 RX ADMIN — IPRATROPIUM BROMIDE 0.5 MG: 0.5 SOLUTION RESPIRATORY (INHALATION) at 13:30

## 2024-06-14 RX ADMIN — DICLOFENAC SODIUM TOPICAL GEL, 1% 2 G: 10 GEL TOPICAL at 13:02

## 2024-06-14 RX ADMIN — METHYLPREDNISOLONE SODIUM SUCCINATE 40 MG: 40 INJECTION, POWDER, FOR SOLUTION INTRAMUSCULAR; INTRAVENOUS at 21:51

## 2024-06-14 RX ADMIN — PANTOPRAZOLE SODIUM 40 MG: 20 TABLET, DELAYED RELEASE ORAL at 15:31

## 2024-06-14 RX ADMIN — LEVALBUTEROL HYDROCHLORIDE 1.25 MG: 1.25 SOLUTION RESPIRATORY (INHALATION) at 07:18

## 2024-06-14 RX ADMIN — Medication 5 ML: at 22:00

## 2024-06-14 RX ADMIN — HYDROCODONE POLISTIREX AND CHLORPHENIRAMINE POLISTIREX 5 ML: 10; 8 SUSPENSION, EXTENDED RELEASE ORAL at 08:58

## 2024-06-14 RX ADMIN — IPRATROPIUM BROMIDE 0.5 MG: 0.5 SOLUTION RESPIRATORY (INHALATION) at 21:01

## 2024-06-14 RX ADMIN — ALPRAZOLAM 0.25 MG: 0.25 TABLET ORAL at 19:47

## 2024-06-14 RX ADMIN — AMLODIPINE BESYLATE 10 MG: 10 TABLET ORAL at 08:58

## 2024-06-14 RX ADMIN — METHOCARBAMOL TABLETS 750 MG: 750 TABLET, COATED ORAL at 13:02

## 2024-06-14 RX ADMIN — LEVALBUTEROL HYDROCHLORIDE 1.25 MG: 1.25 SOLUTION RESPIRATORY (INHALATION) at 21:01

## 2024-06-14 NOTE — PLAN OF CARE
Problem: PAIN - ADULT  Goal: Verbalizes/displays adequate comfort level or baseline comfort level  Description: Interventions:  - Encourage patient to monitor pain and request assistance  - Assess pain using appropriate pain scale  - Administer analgesics based on type and severity of pain and evaluate response  - Implement non-pharmacological measures as appropriate and evaluate response  - Consider cultural and social influences on pain and pain management  - Notify physician/advanced practitioner if interventions unsuccessful or patient reports new pain  Outcome: Progressing     Problem: INFECTION - ADULT  Goal: Absence or prevention of progression during hospitalization  Description: INTERVENTIONS:  - Assess and monitor for signs and symptoms of infection  - Monitor lab/diagnostic results  - Monitor all insertion sites, i.e. indwelling lines, tubes, and drains  - Monitor endotracheal if appropriate and nasal secretions for changes in amount and color  - [place] appropriate cooling/warming therapies per order  - Administer medications as ordered  - Instruct and encourage patient and family to use good hand hygiene technique  - Identify and instruct in appropriate isolation precautions for identified infection/condition  Outcome: Progressing  Goal: Absence of fever/infection during neutropenic period  Description: INTERVENTIONS:  - Monitor WBC    Outcome: Progressing     Problem: SAFETY ADULT  Goal: Patient will remain free of falls  Description: INTERVENTIONS:  - Educate patient/family on patient safety including physical limitations  - Instruct patient to call for assistance with activity   - Consult OT/PT to assist with strengthening/mobility   - Keep Call bell within reach  - Keep bed low and locked with side rails adjusted as appropriate  - Keep care items and personal belongings within reach  - Initiate and maintain comfort rounds  - Make Fall Risk Sign visible to staff  - Offer Toileting every  Hours,  in advance of need  - Initiate/Maintain alarm  - Obtain necessary fall risk management equipment:   - Apply yellow socks and bracelet for high fall risk patients  - Consider moving patient to room near nurses station  Outcome: Progressing  Goal: Maintain or return to baseline ADL function  Description: INTERVENTIONS:  -  Assess patient's ability to carry out ADLs; assess patient's baseline for ADL function and identify physical deficits which impact ability to perform ADLs (bathing, care of mouth/teeth, toileting, grooming, dressing, etc.)  - Assess/evaluate cause of self-care deficits   - Assess range of motion  - Assess patient's mobility; develop plan if impaired  - Assess patient's need for assistive devices and provide as appropriate  - Encourage maximum independence but intervene and supervise when necessary  - Involve family in performance of ADLs  - Assess for home care needs following discharge   - Consider OT consult to assist with ADL evaluation and planning for discharge  - Provide patient education as appropriate  Outcome: Progressing  Goal: Maintains/Returns to pre admission functional level  Description: INTERVENTIONS:  - Perform AM-PAC 6 Click Basic Mobility/ Daily Activity assessment daily.  - Set and communicate daily mobility goal to care team and patient/family/caregiver.   - Collaborate with rehabilitation services on mobility goals if consulted  - Perform Range of Motion  times a day.  - Reposition patient every  hours.  - Dangle patient  times a day  - Stand patient  times a day  - Ambulate patient  times a day  - Out of bed to chair  times a day   - Out of bed for meals times a day  - Out of bed for toileting  - Record patient progress and toleration of activity level   Outcome: Progressing     Problem: DISCHARGE PLANNING  Goal: Discharge to home or other facility with appropriate resources  Description: INTERVENTIONS:  - Identify barriers to discharge w/patient and caregiver  - Arrange for  needed discharge resources and transportation as appropriate  - Identify discharge learning needs (meds, wound care, etc.)  - Arrange for interpretive services to assist at discharge as needed  - Refer to Case Management Department for coordinating discharge planning if the patient needs post-hospital services based on physician/advanced practitioner order or complex needs related to functional status, cognitive ability, or social support system  Outcome: Progressing     Problem: Knowledge Deficit  Goal: Patient/family/caregiver demonstrates understanding of disease process, treatment plan, medications, and discharge instructions  Description: Complete learning assessment and assess knowledge base.  Interventions:  - Provide teaching at level of understanding  - Provide teaching via preferred learning methods  Outcome: Progressing

## 2024-06-14 NOTE — PROGRESS NOTES
[facility]  Progress Note  Name: Marci Lopez I  MRN: [id number]  Unit/Bed#: W -01 I Date of Admission: 6/11/2024   Date of Service: 6/14/2024 I Hospital Day: 3    Assessment & Plan   * Chesty cough  Assessment & Plan  Presented with worsening productive cough, dyspnea, fevers since May 22  Failed outpatient treatment with amoxicillin for 10 days and a 6-day prednisone  Followed by doxycycline for 10 days  Seen PCP today due to worsening symptoms  Unable to be weaned off of oxygen after nebulizer treatments and remained on 2.5 L.  Upon EMS arrival received magnesium, Solu-Medrol, and 3 albuterol nebulizer treatments.   CXR was unremarkable  CTA was negative for PE, diffuse tree-in-bud nodularity, most commonly seen in the setting of infection.  Procalcitonin negative, leukocytosis of 13.05, lactic acid 2.5 -> 2.2  S/p rocephin x1 and Azithromycin x1 in the ED  Urine Strep and Legionella negative  Sputum culture grew 1+ epithelial cells, 2+ polys, 1+ gram positive cocci in pairs, 1+ gram positive rods  No growth in blood    Plan:  Mucinex, Robaxin, benzonate, tussionex, Voltaren gel  Continue methylprednisone 40mg Q12  Albuterol as needed  Incentive spirometry  Airway clearance protocol  Monitor O2 sat  Pulm consult:  breo begun x 3 months  Continue bronchodilators, will consider decreasing steroids today  Respiratory protocol, out of bed to chair, incentive spirometry  Continue antitussives and mucolytics  Will likely need outpatient PFTs  Clinic messaged for follow-up  Home O2 eval before discharge    SIRS (systemic inflammatory response syndrome) (HCC)  Assessment & Plan  SIRS criteria with leukocytosis, tachypnea and tachycardia  Likely in setting of pneumonia    See plan under pneumonia    Essential hypertension  Assessment & Plan  Continue home meds Amlodipine 10 mg and Toprol XL 50 mg    Diarrhea  Assessment & Plan  Chronic diarrhea due to  malabsorption    Psoriasis  Assessment & Plan  Home medication Ultravate; use available conversion    Anxiety  Assessment & Plan  Assessment:  Pt notes anxiety worsening with cough.    Plan:  Continue Xanax as needed           VTE Pharmacologic Prophylaxis: VTE Score: 6 Moderate Risk (Score 3-4) - Pharmacological DVT Prophylaxis Ordered: enoxaparin (Lovenox).    Mobility:   Basic Mobility Inpatient Raw Score: 24  JH-HLM Goal: 8: Walk 250 feet or more  JH-HLM Achieved: 7: Walk 25 feet or more  JH-HLM Goal achieved. Continue to encourage appropriate mobility.    Patient Centered Rounds: I performed bedside rounds with nursing staff today.   Discussions with Specialists or Other Care Team Provider: Pulmonology    Education and Discussions with Family / Patient: Patient declined call to .     Total Time Spent on Date of Encounter in care of patient: 30 mins. This time was spent on one or more of the following: performing physical exam; counseling and coordination of care; obtaining or reviewing history; documenting in the medical record; reviewing/ordering tests, medications or procedures; communicating with other healthcare professionals and discussing with patient's family/caregivers.    Current Length of Stay: 3 day(s)  Current Patient Status: Inpatient   Certification Statement: The patient will continue to require additional inpatient hospital stay due to IV meds needed  Discharge Plan: Anticipate discharge tomorrow to home.    Code Status: Level 1 - Full Code    Subjective:   No acute events overnight. Able to sleep 6h last night. O2 off most of night but expresses relief with it on. Cough is still productive of clear/yellow sputum. Still having episodes of post-tussive vomiting yesterday, able to keep down bland foods. Difficulty eating due to nausea, feels somewhat lightheaded from coughing. Notes abd & chest pain d/t coughing. Feels somewhat SOB. Denies dizziness, palpitations, vision changes,  swelling.     Objective:     Vitals:   Temp (24hrs), Av.1 °F (36.2 °C), Min:96.1 °F (35.6 °C), Max:98.1 °F (36.7 °C)    Temp:  [96.1 °F (35.6 °C)-98.1 °F (36.7 °C)] 96.1 °F (35.6 °C)  HR:  [64-78] 78  BP: (112-124)/(71-87) 124/87  SpO2:  [92 %-98 %] 95 %  Body mass index is 33.05 kg/m².     Input and Output Summary (last 24 hours):     Intake/Output Summary (Last 24 hours) at 2024 1406  Last data filed at 2024 0844  Gross per 24 hour   Intake 520 ml   Output --   Net 520 ml       Physical Exam:   Physical Exam  Vitals and nursing note reviewed.   Constitutional:       General: She is not in acute distress.     Appearance: She is well-developed.   HENT:      Head: Normocephalic and atraumatic.   Eyes:      Conjunctiva/sclera: Conjunctivae normal.   Cardiovascular:      Rate and Rhythm: Normal rate and regular rhythm.      Heart sounds: No murmur heard.  Pulmonary:      Effort: Pulmonary effort is normal. No respiratory distress.      Breath sounds: Wheezing present.      Comments: Wheezing in all lung fields bilaterally  Abdominal:      Palpations: Abdomen is soft.      Tenderness: There is no abdominal tenderness.   Musculoskeletal:         General: Tenderness present. No swelling.      Cervical back: Neck supple.      Right lower leg: No edema.      Left lower leg: No edema.      Comments: Tenderness to palpation of chest and left ribs   Skin:     General: Skin is warm and dry.      Capillary Refill: Capillary refill takes less than 2 seconds.   Neurological:      Mental Status: She is alert.   Psychiatric:         Mood and Affect: Mood normal.          Additional Data:     Labs:  Results from last 7 days   Lab Units 24  1331   WBC Thousand/uL 13.54*   HEMOGLOBIN g/dL 11.9   HEMATOCRIT % 37.4   PLATELETS Thousands/uL 425*   SEGS PCT % 74   LYMPHO PCT % 15   MONO PCT % 3*   EOS PCT % 6     Results from last 7 days   Lab Units 24  0512 24  1304   SODIUM mmol/L 137 141   POTASSIUM  mmol/L 3.9 3.5   CHLORIDE mmol/L 106 106   CO2 mmol/L 24 25   BUN mg/dL 19 17   CREATININE mg/dL 0.72 0.78   ANION GAP mmol/L 7 10   CALCIUM mg/dL 9.6 9.4   ALBUMIN g/dL  --  4.1   TOTAL BILIRUBIN mg/dL  --  0.67   ALK PHOS U/L  --  85   ALT U/L  --  18   AST U/L  --  18   GLUCOSE RANDOM mg/dL 155* 129     Results from last 7 days   Lab Units 06/11/24  1304   INR  1.02             Results from last 7 days   Lab Units 06/12/24  0512 06/11/24  1515 06/11/24  1304   LACTIC ACID mmol/L  --  2.2* 2.5*   PROCALCITONIN ng/ml <0.05  --  <0.05       Lines/Drains:  Invasive Devices       Peripheral Intravenous Line  Duration             Peripheral IV 06/11/24 Left Antecubital 3 days                          Imaging: Reviewed radiology reports from this admission including: chest xray and chest CT scan    Recent Cultures (last 7 days):   Results from last 7 days   Lab Units 06/11/24  2124 06/11/24  1840 06/11/24  1325 06/11/24  1304   BLOOD CULTURE   --   --  No Growth at 48 hrs. No Growth at 48 hrs.   SPUTUM CULTURE   --  2+ Growth of  --   --    GRAM STAIN RESULT   --  1+ Epithelial cells per low power field*  2+ Polys*  1+ Gram positive cocci in pairs*  1+ Gram positive rods*  --   --    LEGIONELLA URINARY ANTIGEN  Negative  --   --   --        Last 24 Hours Medication List:   Current Facility-Administered Medications   Medication Dose Route Frequency Provider Last Rate    albuterol  2 puff Inhalation Q4H PRN Kassie Horton MD      ALPRAZolam  0.25 mg Oral 4x Daily PRN Noe Bell, DO      amLODIPine  10 mg Oral Daily Kassie Horton MD      benzonatate  100 mg Oral TID Brandy Dahl DO      butalbital-acetaminophen-caffeine  1 tablet Oral Q4H PRN Noe Bell, DO      calcium carbonate  500 mg Oral Daily PRN Eric Burks MD      clobetasol   Topical BID Kassie Horton MD      dextromethorphan-guaiFENesin  10 mL Oral Q4H PRN Noe Bell, DO      Diclofenac Sodium  2 g Topical 4x Daily Taeck  MD Kimmie      enoxaparin  40 mg Subcutaneous Daily Kassie Horton MD      fluticasone  1 spray Each Nare Daily Liv Berumen MD      fluticasone-vilanterol  1 puff Inhalation Daily Brandy Dahl,       guaiFENesin  1,200 mg Oral Q12H YOLY Eric Burks MD      Hydrocod Wes-Chlorphe Wes ER  5 mL Oral BID Brandy Dahl, DO      HYDROcodone Bit-Homatrop MBr  5 mL Oral Q4H PRN Noe Bell,       ipratropium  0.5 mg Nebulization TID Brandy Dahl, DO      levalbuterol  1.25 mg Nebulization Q6H PRN Eric Burks MD      levalbuterol  1.25 mg Nebulization TID Brandy Dahl, DO      methocarbamol  750 mg Oral Q6H YOLY Noe Bell, DO      methylPREDNISolone sodium succinate  40 mg Intravenous Q12H YOLY Keith Guy, DO      metoprolol succinate  50 mg Oral Daily Kassie Horton MD      pantoprazole  40 mg Oral BID AC Liv Berumen MD      phenol  1 spray Mouth/Throat Q2H PRN Liv Berumen MD          Today, Patient Was Seen By: Noe Bell DO    **Please Note: This note may have been constructed using a voice recognition system.**

## 2024-06-14 NOTE — PHYSICAL THERAPY NOTE
Physical Therapy Screen    Patient Name: Marci Lopez    Today's Date: 6/14/2024     Problem List  Principal Problem:    Chesty cough  Active Problems:    Essential hypertension    Anxiety    Psoriasis    Diarrhea    SIRS (systemic inflammatory response syndrome) (HCC)       Past Medical History  Past Medical History:   Diagnosis Date    Allergic rhinitis     Anxiety     Colon polyp     Disease of thyroid gland     Endometriosis     Fibroid     Hypertension     Kidney stones     left side    Migraine     teenager        Past Surgical History  Past Surgical History:   Procedure Laterality Date    ABDOMINAL SURGERY  05/09/2023    tummy tuck hernia repair    ABDOMINOPLASTY      APPENDECTOMY      CHOLECYSTECTOMY      FOOT FRACTURE SURGERY  2021    HERNIA REPAIR      umbilical x 3 last one 05/2023    KNEE CARTILAGE SURGERY Right     and ACL    LAPAROSCOPIC GASTRIC BANDING      removed    OVARIAN CYST SURGERY      SHOULDER SURGERY Right     x 3    STOMACH SURGERY  05/2023    tummy tuck         06/14/24 1632   Note Type   Note type Screen   Additional Comments Pt orders received. Chart reviewed. per SUKUMAR gibbs, pt has been ambulating independently in room without AD with steady gait. given this, pt appears to be at functional baseline and does not present with need for IP PT services. Pt will be removed from PT caseload at this time. Please reconsult if there is a change in status.     Samir Diehl, PT

## 2024-06-14 NOTE — PROGRESS NOTES
PULMONOLOGY PROGRESS NOTE     Name: Marci Lopez   Age & Sex: 54 y.o. female   MRN: [id number]  Unit/Bed#: W -01   Encounter: [id number]    PATIENT INFORMATION     Name: Marci Lopez   Age & Sex: 54 y.o. female   MRN: [id number]  Hospital Stay Days: 3    ASSESSMENT/PLAN     Assessment:     Patient is a 54-year-old female with a past medical history of hypertension, psoriasis, previous tobacco abuse who presented with worsening productive cough dyspnea and fevers for the past few weeks.  Started on treatment for pneumonia.  Pulmonology was consulted for further recommendations.    Acute hypoxemic respiratory failure  Suspected pneumonia  Reactive airway disease versus EVALI  Tobacco abuse  Hypertension  Psoriasis    Plan:  Procalcitonin negative, antibiotics discontinued  Continue bronchodilators, will consider decreasing steroids today  Respiratory protocol, out of bed to chair, incentive spirometry  Continue antitussives and mucolytics  Continue Breo, would benefit from continuing as an outpatient  Will likely need outpatient PFTs  Clinic messaged for follow-up  Home O2 eval before discharge  Rest of care per primary      SUBJECTIVE     Patient seen and examined. No acute events overnight.  Breathing has improved with steroids and bronchodilators.  Still with cough.  All other review of systems negative.    OBJECTIVE     Vitals:    24 2214 24 0718 24 0736 24 0736   BP: 116/78  124/87 124/87   Pulse: 64  76 78   Resp:       Temp: 98 °F (36.7 °C)  (!) 96.1 °F (35.6 °C) (!) 96.1 °F (35.6 °C)   TempSrc:       SpO2: 97% 97% 98% 98%   Weight:       Height:          Temperature:   Temp (24hrs), Av.1 °F (36.2 °C), Min:96.1 °F (35.6 °C), Max:98.1 °F (36.7 °C)    Temperature: (!) 96.1 °F (35.6 °C)  Intake & Output:  I/O          0701   0700  0701   07 0701  06/15 0700    P.O. 360 980 180    I.V. (mL/kg)       Total Intake(mL/kg) 360 (4) 980 (10.9) 180  (2)    Net +360 +980 +180           Unmeasured Urine Occurrence 2 x      Unmeasured Emesis Occurrence 1 x            Weights:   IBW (Ideal Body Weight): 57 kg    Body mass index is 33.05 kg/m².  Weight (last 2 days)       None          Physical Exam  Vitals reviewed.   Constitutional:       General: She is not in acute distress.  HENT:      Head: Normocephalic and atraumatic.      Mouth/Throat:      Mouth: Mucous membranes are moist.      Pharynx: Oropharynx is clear.   Eyes:      Extraocular Movements: Extraocular movements intact.      Pupils: Pupils are equal, round, and reactive to light.   Cardiovascular:      Rate and Rhythm: Normal rate and regular rhythm.   Pulmonary:      Effort: Pulmonary effort is normal.      Breath sounds: Decreased breath sounds and wheezing present.   Abdominal:      General: Bowel sounds are normal.      Palpations: Abdomen is soft.   Musculoskeletal:         General: Normal range of motion.      Cervical back: Normal range of motion and neck supple.   Skin:     General: Skin is warm and dry.      Capillary Refill: Capillary refill takes less than 2 seconds.   Neurological:      General: No focal deficit present.      Mental Status: She is alert.   Psychiatric:         Mood and Affect: Mood normal.         Behavior: Behavior normal.           LABORATORY DATA     Labs: I have personally reviewed pertinent reports.  Results from last 7 days   Lab Units 06/13/24  1331 06/12/24  0512 06/11/24  1304   WBC Thousand/uL 13.54* 13.41* 13.05*   HEMOGLOBIN g/dL 11.9 11.9 13.2   HEMATOCRIT % 37.4 37.0 41.7   PLATELETS Thousands/uL 425* 489* 521*   SEGS PCT % 74  --  49   MONO PCT % 3*  --  6   EOS PCT % 6  --  6      Results from last 7 days   Lab Units 06/12/24  0512 06/11/24  1304   POTASSIUM mmol/L 3.9 3.5   CHLORIDE mmol/L 106 106   CO2 mmol/L 24 25   BUN mg/dL 19 17   CREATININE mg/dL 0.72 0.78   CALCIUM mg/dL 9.6 9.4   ALK PHOS U/L  --  85   ALT U/L  --  18   AST U/L  --  18               Results from last 7 days   Lab Units 06/11/24  1304   INR  1.02   PTT seconds 31     Results from last 7 days   Lab Units 06/11/24  1515   LACTIC ACID mmol/L 2.2*     Micro:   Results from last 7 days   Lab Units 06/11/24  2124 06/11/24  1840 06/11/24  1325 06/11/24  1304   BLOOD CULTURE   --   --  No Growth at 48 hrs. No Growth at 48 hrs.   SPUTUM CULTURE   --  2+ Growth of  --   --    GRAM STAIN RESULT   --  1+ Epithelial cells per low power field*  2+ Polys*  1+ Gram positive cocci in pairs*  1+ Gram positive rods*  --   --    LEGIONELLA URINARY ANTIGEN  Negative  --   --   --    STREP PNEUMONIAE ANTIGEN, URINE  Negative  --   --   --          IMAGING & DIAGNOSTIC TESTING     Radiology Results: I have personally reviewed pertinent reports.  XR chest portable    Result Date: 6/11/2024  Impression: No acute cardiopulmonary disease. Workstation performed: [id number]     CTA ED chest PE study    Result Date: 6/11/2024  Impression: No pulmonary embolism. Diffuse tree-in-bud nodularity. Most commonly seen in the setting of infection. Small hiatal hernia. Workstation performed: [id number]     Other Diagnostic Testing: I have personally reviewed pertinent reports.    ACTIVE MEDICATIONS     Current Facility-Administered Medications   Medication Dose Route Frequency    albuterol (PROVENTIL HFA,VENTOLIN HFA) inhaler 2 puff  2 puff Inhalation Q4H PRN    ALPRAZolam (XANAX) tablet 0.25 mg  0.25 mg Oral 4x Daily PRN    amLODIPine (NORVASC) tablet 10 mg  10 mg Oral Daily    benzonatate (TESSALON PERLES) capsule 100 mg  100 mg Oral TID    butalbital-acetaminophen-caffeine (FIORICET,ESGIC) -40 mg per tablet 1 tablet  1 tablet Oral Q4H PRN    calcium carbonate (TUMS) chewable tablet 500 mg  500 mg Oral Daily PRN    clobetasol (TEMOVATE) 0.05 % cream   Topical BID    dextromethorphan-guaiFENesin (ROBITUSSIN DM) oral syrup 10 mL  10 mL Oral Q4H PRN    Diclofenac Sodium (VOLTAREN) 1 % topical gel 2 g  2 g Topical 4x Daily     "enoxaparin (LOVENOX) subcutaneous injection 40 mg  40 mg Subcutaneous Daily    fluticasone (FLONASE) 50 mcg/act nasal spray 1 spray  1 spray Each Nare Daily    fluticasone-vilanterol 200-25 mcg/actuation 1 puff  1 puff Inhalation Daily    guaiFENesin (MUCINEX) 12 hr tablet 1,200 mg  1,200 mg Oral Q12H YOLY    Hydrocod Wes-Chlorphe Wes ER (TUSSIONEX) ER suspension 5 mL  5 mL Oral BID    HYDROcodone Bit-Homatrop MBr (HYCODAN) oral syrup 5 mL  5 mL Oral Q4H PRN    ipratropium (ATROVENT) 0.02 % inhalation solution 0.5 mg  0.5 mg Nebulization TID    levalbuterol (XOPENEX) inhalation solution 1.25 mg  1.25 mg Nebulization Q6H PRN    levalbuterol (XOPENEX) inhalation solution 1.25 mg  1.25 mg Nebulization TID    methocarbamol (ROBAXIN) tablet 750 mg  750 mg Oral Q6H [facility]    methylPREDNISolone sodium succinate (Solu-MEDROL) injection 40 mg  40 mg Intravenous Q8H [facility]    metoprolol succinate (TOPROL-XL) 24 hr tablet 50 mg  50 mg Oral Daily    pantoprazole (PROTONIX) EC tablet 40 mg  40 mg Oral BID AC    phenol (CHLORASEPTIC) 1.4 % mucosal liquid 1 spray  1 spray Mouth/Throat Q2H PRN         Disclaimer: Portions of the record may have been created with voice recognition software. Occasional wrong word or \"sound a like\" substitutions may have occurred due to the inherent limitations of voice recognition software. Careful consideration should be taken to recognize, using context, where substitutions have occurred.    Keith Guy DO, MS  Pulmonary and Critical Care Fellow, PGY-IV  [facility]    "

## 2024-06-14 NOTE — OCCUPATIONAL THERAPY NOTE
Occupational Therapy Screen     Patient Name: Marci Lpoez  Today's Date: 6/14/2024  Problem List  Principal Problem:    Pneumonia  Active Problems:    Essential hypertension    Anxiety    Psoriasis    Diarrhea    SIRS (systemic inflammatory response syndrome) ([facility])        06/14/24 1029   OT Last Visit   OT Visit Date 06/14/24   Note Type   Note type Screen   Additional Comments OT orders received, chart reviewed. Spoke with PT Demetri who reports per LPB Aren, pt has been independent in the room for all ADLs and mobility. No IP OT needs indicated at this time. Will D/C from caseload, please reconsult for any future needs     Nina Cordon, OT

## 2024-06-14 NOTE — ASSESSMENT & PLAN NOTE
Presented with worsening productive cough, dyspnea, fevers since May 22  Failed outpatient treatment with amoxicillin for 10 days and a 6-day prednisone  Followed by doxycycline for 10 days  Seen PCP today due to worsening symptoms  Unable to be weaned off of oxygen after nebulizer treatments and remained on 2.5 L.  Upon EMS arrival received magnesium, Solu-Medrol, and 3 albuterol nebulizer treatments.   CXR was unremarkable  CTA was negative for PE, diffuse tree-in-bud nodularity, most commonly seen in the setting of infection.  Procalcitonin negative, leukocytosis of 13.05, lactic acid 2.5 -> 2.2  S/p rocephin x1 and Azithromycin x1 in the ED  Urine Strep and Legionella negative  Sputum culture grew 1+ epithelial cells, 2+ polys, 1+ gram positive cocci in pairs, 1+ gram positive rods  No growth in blood    Plan:  Mucinex, Robaxin, benzonate, tussionex, Voltaren gel  Continue methylprednisone 40mg Q12  Albuterol as needed  Incentive spirometry  Airway clearance protocol  Monitor O2 sat  Pulm consult:  breo begun x 3 months  Continue bronchodilators, will consider decreasing steroids today  Respiratory protocol, out of bed to chair, incentive spirometry  Continue antitussives and mucolytics  Will likely need outpatient PFTs  Clinic messaged for follow-up  Home O2 eval before discharge

## 2024-06-15 PROCEDURE — 94760 N-INVAS EAR/PLS OXIMETRY 1: CPT

## 2024-06-15 PROCEDURE — 99232 SBSQ HOSP IP/OBS MODERATE 35: CPT | Performed by: HOSPITALIST

## 2024-06-15 PROCEDURE — 94640 AIRWAY INHALATION TREATMENT: CPT

## 2024-06-15 RX ORDER — PREDNISONE 20 MG/1
40 TABLET ORAL DAILY
Status: DISCONTINUED | OUTPATIENT
Start: 2024-06-16 | End: 2024-06-17

## 2024-06-15 RX ADMIN — BENZONATATE 100 MG: 100 CAPSULE ORAL at 09:46

## 2024-06-15 RX ADMIN — HYDROCODONE POLISTIREX AND CHLORPHENIRAMINE POLISTIREX 5 ML: 10; 8 SUSPENSION, EXTENDED RELEASE ORAL at 18:00

## 2024-06-15 RX ADMIN — FLUTICASONE PROPIONATE 1 SPRAY: 50 SPRAY, METERED NASAL at 09:48

## 2024-06-15 RX ADMIN — ALPRAZOLAM 0.25 MG: 0.25 TABLET ORAL at 00:41

## 2024-06-15 RX ADMIN — DICLOFENAC SODIUM TOPICAL GEL, 1% 2 G: 10 GEL TOPICAL at 21:57

## 2024-06-15 RX ADMIN — IPRATROPIUM BROMIDE 0.5 MG: 0.5 SOLUTION RESPIRATORY (INHALATION) at 13:35

## 2024-06-15 RX ADMIN — DICLOFENAC SODIUM TOPICAL GEL, 1% 2 G: 10 GEL TOPICAL at 18:01

## 2024-06-15 RX ADMIN — DICLOFENAC SODIUM TOPICAL GEL, 1% 2 G: 10 GEL TOPICAL at 12:00

## 2024-06-15 RX ADMIN — Medication 5 ML: at 13:28

## 2024-06-15 RX ADMIN — CLOBETASOL PROPIONATE: 0.5 CREAM TOPICAL at 09:50

## 2024-06-15 RX ADMIN — HYDROCODONE POLISTIREX AND CHLORPHENIRAMINE POLISTIREX 5 ML: 10; 8 SUSPENSION, EXTENDED RELEASE ORAL at 09:46

## 2024-06-15 RX ADMIN — PANTOPRAZOLE SODIUM 40 MG: 20 TABLET, DELAYED RELEASE ORAL at 16:06

## 2024-06-15 RX ADMIN — PANTOPRAZOLE SODIUM 40 MG: 20 TABLET, DELAYED RELEASE ORAL at 06:03

## 2024-06-15 RX ADMIN — FLUTICASONE FUROATE AND VILANTEROL TRIFENATATE 1 PUFF: 200; 25 POWDER RESPIRATORY (INHALATION) at 09:48

## 2024-06-15 RX ADMIN — Medication 5 ML: at 06:03

## 2024-06-15 RX ADMIN — LEVALBUTEROL HYDROCHLORIDE 1.25 MG: 1.25 SOLUTION RESPIRATORY (INHALATION) at 13:35

## 2024-06-15 RX ADMIN — IPRATROPIUM BROMIDE 0.5 MG: 0.5 SOLUTION RESPIRATORY (INHALATION) at 07:06

## 2024-06-15 RX ADMIN — BENZONATATE 100 MG: 100 CAPSULE ORAL at 21:56

## 2024-06-15 RX ADMIN — LEVALBUTEROL HYDROCHLORIDE 1.25 MG: 1.25 SOLUTION RESPIRATORY (INHALATION) at 07:06

## 2024-06-15 RX ADMIN — BENZONATATE 100 MG: 100 CAPSULE ORAL at 16:06

## 2024-06-15 RX ADMIN — LEVALBUTEROL HYDROCHLORIDE 1.25 MG: 1.25 SOLUTION RESPIRATORY (INHALATION) at 19:23

## 2024-06-15 RX ADMIN — AMLODIPINE BESYLATE 10 MG: 10 TABLET ORAL at 09:46

## 2024-06-15 RX ADMIN — METHOCARBAMOL TABLETS 750 MG: 750 TABLET, COATED ORAL at 06:03

## 2024-06-15 RX ADMIN — METHYLPREDNISOLONE SODIUM SUCCINATE 40 MG: 40 INJECTION, POWDER, FOR SOLUTION INTRAMUSCULAR; INTRAVENOUS at 09:47

## 2024-06-15 RX ADMIN — Medication 5 ML: at 21:56

## 2024-06-15 RX ADMIN — GUAIFENESIN 1200 MG: 600 TABLET ORAL at 09:46

## 2024-06-15 RX ADMIN — GUAIFENESIN 1200 MG: 600 TABLET ORAL at 21:56

## 2024-06-15 RX ADMIN — ALPRAZOLAM 0.25 MG: 0.25 TABLET ORAL at 10:04

## 2024-06-15 RX ADMIN — METHOCARBAMOL TABLETS 750 MG: 750 TABLET, COATED ORAL at 18:00

## 2024-06-15 RX ADMIN — METHOCARBAMOL TABLETS 750 MG: 750 TABLET, COATED ORAL at 00:42

## 2024-06-15 RX ADMIN — METHOCARBAMOL TABLETS 750 MG: 750 TABLET, COATED ORAL at 23:43

## 2024-06-15 RX ADMIN — DICLOFENAC SODIUM TOPICAL GEL, 1% 2 G: 10 GEL TOPICAL at 09:49

## 2024-06-15 RX ADMIN — METOPROLOL SUCCINATE 50 MG: 50 TABLET, EXTENDED RELEASE ORAL at 09:46

## 2024-06-15 RX ADMIN — ALPRAZOLAM 0.25 MG: 0.25 TABLET ORAL at 21:56

## 2024-06-15 RX ADMIN — METHOCARBAMOL TABLETS 750 MG: 750 TABLET, COATED ORAL at 12:00

## 2024-06-15 RX ADMIN — CLOBETASOL PROPIONATE: 0.5 CREAM TOPICAL at 18:01

## 2024-06-15 RX ADMIN — IPRATROPIUM BROMIDE 0.5 MG: 0.5 SOLUTION RESPIRATORY (INHALATION) at 19:23

## 2024-06-15 RX ADMIN — BUTALBITAL, ACETAMINOPHEN, AND CAFFEINE 1 TABLET: 325; 50; 40 TABLET ORAL at 13:28

## 2024-06-15 NOTE — PROGRESS NOTES
[facility]  Progress Note  Name: Marci Lopez I  MRN: [id number]  Unit/Bed#: W -01 I Date of Admission: 6/11/2024   Date of Service: 6/15/2024 I Hospital Day: 4    Assessment & Plan   * Chesty cough  Assessment & Plan  Presented with worsening productive cough, dyspnea, fevers since May 22  Failed outpatient treatment with amoxicillin for 10 days and a 6-day prednisone  Followed by doxycycline for 10 days  Seen PCP today due to worsening symptoms  Unable to be weaned off of oxygen after nebulizer treatments and remained on 2.5 L.  Upon EMS arrival received magnesium, Solu-Medrol, and 3 albuterol nebulizer treatments.   CXR was unremarkable  CTA was negative for PE, diffuse tree-in-bud nodularity, most commonly seen in the setting of infection.  Procalcitonin negative, leukocytosis of 13.05, lactic acid 2.5 -> 2.2  S/p rocephin x1 and Azithromycin x1 in the ED  RP2 Negative  Urine Strep and Legionella negative  Sputum culture grew 1+ epithelial cells, 2+ polys, 1+ gram positive cocci in pairs, 1+ gram positive rods  No growth in blood    Plan:  Mucinex, Robaxin, benzonate, tussionex, Voltaren gel  Continue methylprednisone 40mg Q12- Transition to oral & taper doing recommendations from pulmonology  Albuterol as needed  Incentive spirometry  Airway clearance protocol  Monitor O2 sat  Pulm consult:  breo begun x 3 months  Continue bronchodilators, will consider decreasing steroids today  Respiratory protocol, out of bed to chair, incentive spirometry  Continue antitussives and mucolytics  Will likely need outpatient PFTs      SIRS (systemic inflammatory response syndrome) (HCC)  Assessment & Plan  SIRS criteria with leukocytosis, tachypnea and tachycardia  Likely in setting of pneumonia    See plan under pneumonia    Essential hypertension  Assessment & Plan  Continue home meds Amlodipine 10 mg and Toprol XL 50 mg         VTE Pharmacologic Prophylaxis: VTE Score: 6 High Risk (Score  >/= 5) - Pharmacological DVT Prophylaxis Ordered: enoxaparin (Lovenox). Sequential Compression Devices Ordered.    Mobility:   Basic Mobility Inpatient Raw Score: 24  JH-HLM Goal: 8: Walk 250 feet or more  JH-HLM Achieved: 8: Walk 250 feet ot more  JH-HLM Goal achieved. Continue to encourage appropriate mobility.    Patient Centered Rounds: I performed bedside rounds with nursing staff today.   Discussions with Specialists or Other Care Team Provider: Pulmonology    Education and Discussions with Family / Patient:  To be updated.     Total Time Spent on Date of Encounter in care of patient: 30 mins. This time was spent on one or more of the following: performing physical exam; counseling and coordination of care; obtaining or reviewing history; documenting in the medical record; reviewing/ordering tests, medications or procedures; communicating with other healthcare professionals and discussing with patient's family/caregivers.    Current Length of Stay: 4 day(s)  Current Patient Status: Inpatient   Certification Statement: The patient will continue to require additional inpatient hospital stay due to awaiting pulm eval  Discharge Plan: Anticipate discharge tomorrow to home.    Code Status: Level 1 - Full Code    Subjective:   No acute events overnight. Able to sleep last night. O2 off all night. Cough is still productive of clear/yellow sputum. Had 1 episode of post-tussive vomiting last night, able to keep down bland foods. Difficulty eating due to nausea, feels somewhat lightheaded from coughing. Notes abd & chest pain d/t coughing. Feels somewhat SOB. Denies dizziness, palpitations, vision changes, swelling.     Objective:     Vitals:   Temp (24hrs), Av.8 °F (36.6 °C), Min:97.5 °F (36.4 °C), Max:98.1 °F (36.7 °C)    Temp:  [97.5 °F (36.4 °C)-98.1 °F (36.7 °C)] 97.5 °F (36.4 °C)  HR:  [74-86] 74  Resp:  [16] 16  BP: (126-140)/(72-88) 140/88  SpO2:  [93 %-98 %] 93 %  Body mass index is 33.05 kg/m².     Input  and Output Summary (last 24 hours):     Intake/Output Summary (Last 24 hours) at 6/15/2024 1131  Last data filed at 6/15/2024 0946  Gross per 24 hour   Intake 380 ml   Output --   Net 380 ml       Physical Exam:   Physical Exam  Vitals and nursing note reviewed.   Constitutional:       General: She is not in acute distress.     Appearance: She is well-developed.   HENT:      Head: Normocephalic and atraumatic.   Eyes:      Conjunctiva/sclera: Conjunctivae normal.   Cardiovascular:      Rate and Rhythm: Normal rate and regular rhythm.      Heart sounds: No murmur heard.  Pulmonary:      Effort: Pulmonary effort is normal. No respiratory distress.      Breath sounds: Wheezing present.      Comments: Mild wheezing at both lung bases & anteriorly  Abdominal:      Palpations: Abdomen is soft.      Tenderness: There is no abdominal tenderness.   Musculoskeletal:         General: Tenderness present. No swelling.      Cervical back: Neck supple.      Right lower leg: No edema.      Left lower leg: No edema.      Comments: Tenderness to palpation in central chest and left ribs.   Skin:     General: Skin is warm and dry.      Capillary Refill: Capillary refill takes less than 2 seconds.   Neurological:      Mental Status: She is alert.   Psychiatric:         Mood and Affect: Mood normal.          Additional Data:     Labs:  Results from last 7 days   Lab Units 06/13/24  1331   WBC Thousand/uL 13.54*   HEMOGLOBIN g/dL 11.9   HEMATOCRIT % 37.4   PLATELETS Thousands/uL 425*   SEGS PCT % 74   LYMPHO PCT % 15   MONO PCT % 3*   EOS PCT % 6     Results from last 7 days   Lab Units 06/12/24  0512 06/11/24  1304   SODIUM mmol/L 137 141   POTASSIUM mmol/L 3.9 3.5   CHLORIDE mmol/L 106 106   CO2 mmol/L 24 25   BUN mg/dL 19 17   CREATININE mg/dL 0.72 0.78   ANION GAP mmol/L 7 10   CALCIUM mg/dL 9.6 9.4   ALBUMIN g/dL  --  4.1   TOTAL BILIRUBIN mg/dL  --  0.67   ALK PHOS U/L  --  85   ALT U/L  --  18   AST U/L  --  18   GLUCOSE RANDOM  mg/dL 155* 129     Results from last 7 days   Lab Units 06/11/24  1304   INR  1.02             Results from last 7 days   Lab Units 06/12/24  0512 06/11/24  1515 06/11/24  1304   LACTIC ACID mmol/L  --  2.2* 2.5*   PROCALCITONIN ng/ml <0.05  --  <0.05       Lines/Drains:  Invasive Devices       Peripheral Intravenous Line  Duration             Peripheral IV 06/11/24 Left Antecubital 3 days                          Imaging: Reviewed radiology reports from this admission including: chest xray and chest CT scan    Recent Cultures (last 7 days):   Results from last 7 days   Lab Units 06/11/24  2124 06/11/24  1840 06/11/24  1325 06/11/24  1304   BLOOD CULTURE   --   --  No Growth at 72 hrs. No Growth at 72 hrs.   SPUTUM CULTURE   --  2+ Growth of  --   --    GRAM STAIN RESULT   --  1+ Epithelial cells per low power field*  2+ Polys*  1+ Gram positive cocci in pairs*  1+ Gram positive rods*  --   --    LEGIONELLA URINARY ANTIGEN  Negative  --   --   --        Last 24 Hours Medication List:   Current Facility-Administered Medications   Medication Dose Route Frequency Provider Last Rate    albuterol  2 puff Inhalation Q4H PRN Kassie Horton MD      ALPRAZolam  0.25 mg Oral 4x Daily PRN Noe Bell, DO      amLODIPine  10 mg Oral Daily Kassie Horton MD      benzonatate  100 mg Oral TID Brandy Dahl DO      butalbital-acetaminophen-caffeine  1 tablet Oral Q4H PRN Noe Bell, DO      calcium carbonate  500 mg Oral Daily PRN Eric Burks MD      clobetasol   Topical BID Kassie Horton MD      dextromethorphan-guaiFENesin  10 mL Oral Q4H PRN Noe Bell, DO      Diclofenac Sodium  2 g Topical 4x Daily Eric Burks MD      enoxaparin  40 mg Subcutaneous Daily Kassie Horton MD      fluticasone  1 spray Each Nare Daily Liv Berumen MD      fluticasone-vilanterol  1 puff Inhalation Daily Brandy Dahl DO      guaiFENesin  1,200 mg Oral Q12H YOLY  Eric Burks MD      Hydrocod Wes-Chlorphe Wes ER  5 mL Oral BID Brandy Bratis, DO      HYDROcodone Bit-Homatrop MBr  5 mL Oral Q4H PRN Noe Bell, DO      ipratropium  0.5 mg Nebulization TID Brandy Bratis, DO      levalbuterol  1.25 mg Nebulization TID Brandy Bratis, DO      methocarbamol  750 mg Oral Q6H YOLY Noe Bell, DO      methylPREDNISolone sodium succinate  40 mg Intravenous Q12H YOLY Keith Guy, DO      metoprolol succinate  50 mg Oral Daily Kassie Horton MD      pantoprazole  40 mg Oral BID AC Liv Berumen MD      phenol  1 spray Mouth/Throat Q2H PRN Liv Berumen MD          Today, Patient Was Seen By: Kyle Brunner, MD    **Please Note: This note may have been constructed using a voice recognition system.**

## 2024-06-15 NOTE — PLAN OF CARE
Problem: PAIN - ADULT  Goal: Verbalizes/displays adequate comfort level or baseline comfort level  Description: Interventions:  - Encourage patient to monitor pain and request assistance  - Assess pain using appropriate pain scale  - Administer analgesics based on type and severity of pain and evaluate response  - Implement non-pharmacological measures as appropriate and evaluate response  - Consider cultural and social influences on pain and pain management  - Notify physician/advanced practitioner if interventions unsuccessful or patient reports new pain  Outcome: Progressing     Problem: INFECTION - ADULT  Goal: Absence or prevention of progression during hospitalization  Description: INTERVENTIONS:  - Assess and monitor for signs and symptoms of infection  - Monitor lab/diagnostic results  - Monitor all insertion sites, i.e. indwelling lines, tubes, and drains  - Monitor endotracheal if appropriate and nasal secretions for changes in amount and color  - [place] appropriate cooling/warming therapies per order  - Administer medications as ordered  - Instruct and encourage patient and family to use good hand hygiene technique  - Identify and instruct in appropriate isolation precautions for identified infection/condition  Outcome: Progressing  Goal: Absence of fever/infection during neutropenic period  Description: INTERVENTIONS:  - Monitor WBC    Outcome: Progressing     Problem: SAFETY ADULT  Goal: Patient will remain free of falls  Description: INTERVENTIONS:  - Educate patient/family on patient safety including physical limitations  - Instruct patient to call for assistance with activity   - Consult OT/PT to assist with strengthening/mobility   - Keep Call bell within reach  - Keep bed low and locked with side rails adjusted as appropriate  - Keep care items and personal belongings within reach  - Initiate and maintain comfort rounds  - Make Fall Risk Sign visible to staff  - Offer Toileting every  Hours,  in advance of need  - Initiate/Maintain alarm  - Obtain necessary fall risk management equipment:   - Apply yellow socks and bracelet for high fall risk patients  - Consider moving patient to room near nurses station  Outcome: Progressing  Goal: Maintain or return to baseline ADL function  Description: INTERVENTIONS:  -  Assess patient's ability to carry out ADLs; assess patient's baseline for ADL function and identify physical deficits which impact ability to perform ADLs (bathing, care of mouth/teeth, toileting, grooming, dressing, etc.)  - Assess/evaluate cause of self-care deficits   - Assess range of motion  - Assess patient's mobility; develop plan if impaired  - Assess patient's need for assistive devices and provide as appropriate  - Encourage maximum independence but intervene and supervise when necessary  - Involve family in performance of ADLs  - Assess for home care needs following discharge   - Consider OT consult to assist with ADL evaluation and planning for discharge  - Provide patient education as appropriate  Outcome: Progressing  Goal: Maintains/Returns to pre admission functional level  Description: INTERVENTIONS:  - Perform AM-PAC 6 Click Basic Mobility/ Daily Activity assessment daily.  - Set and communicate daily mobility goal to care team and patient/family/caregiver.   - Collaborate with rehabilitation services on mobility goals if consulted  - Perform Range of Motion  times a day.  - Reposition patient every  hours.  - Dangle patient  times a day  - Stand patient  times a day  - Ambulate patient  times a day  - Out of bed to chair times a day   - Out of bed for meals  times a day  - Out of bed for toileting  - Record patient progress and toleration of activity level   Outcome: Progressing     Problem: DISCHARGE PLANNING  Goal: Discharge to home or other facility with appropriate resources  Description: INTERVENTIONS:  - Identify barriers to discharge w/patient and caregiver  - Arrange for  needed discharge resources and transportation as appropriate  - Identify discharge learning needs (meds, wound care, etc.)  - Arrange for interpretive services to assist at discharge as needed  - Refer to Case Management Department for coordinating discharge planning if the patient needs post-hospital services based on physician/advanced practitioner order or complex needs related to functional status, cognitive ability, or social support system  Outcome: Progressing     Problem: Knowledge Deficit  Goal: Patient/family/caregiver demonstrates understanding of disease process, treatment plan, medications, and discharge instructions  Description: Complete learning assessment and assess knowledge base.  Interventions:  - Provide teaching at level of understanding  - Provide teaching via preferred learning methods  Outcome: Progressing

## 2024-06-15 NOTE — ASSESSMENT & PLAN NOTE
Presented with worsening productive cough, dyspnea, fevers since May 22  Failed outpatient treatment with amoxicillin for 10 days and a 6-day prednisone  Followed by doxycycline for 10 days  Seen PCP today due to worsening symptoms  Unable to be weaned off of oxygen after nebulizer treatments and remained on 2.5 L.  Upon EMS arrival received magnesium, Solu-Medrol, and 3 albuterol nebulizer treatments.   CXR was unremarkable  CTA was negative for PE, diffuse tree-in-bud nodularity, most commonly seen in the setting of infection.  Procalcitonin negative, leukocytosis of 13.05, lactic acid 2.5 -> 2.2  S/p rocephin x1 and Azithromycin x1 in the ED  RP2 Negative  Urine Strep and Legionella negative  Sputum culture grew 1+ epithelial cells, 2+ polys, 1+ gram positive cocci in pairs, 1+ gram positive rods  No growth in blood    Plan:  Mucinex, Robaxin, benzonate, tussionex, Voltaren gel  Continue methylprednisone 40mg Q12- Transition to oral & taper doing recommendations from pulmonology  Albuterol as needed  Incentive spirometry  Airway clearance protocol  Monitor O2 sat  Pulm consult:  breo begun x 3 months  Continue bronchodilators, will consider decreasing steroids today  Respiratory protocol, out of bed to chair, incentive spirometry  Continue antitussives and mucolytics  Will likely need outpatient PFTs

## 2024-06-16 PROBLEM — J45.909 REACTIVE AIRWAY DISEASE: Status: ACTIVE | Noted: 2024-06-11

## 2024-06-16 LAB
BACTERIA BLD CULT: NORMAL
BACTERIA BLD CULT: NORMAL

## 2024-06-16 PROCEDURE — 94760 N-INVAS EAR/PLS OXIMETRY 1: CPT

## 2024-06-16 PROCEDURE — 99232 SBSQ HOSP IP/OBS MODERATE 35: CPT | Performed by: INTERNAL MEDICINE

## 2024-06-16 PROCEDURE — 99232 SBSQ HOSP IP/OBS MODERATE 35: CPT | Performed by: NURSE PRACTITIONER

## 2024-06-16 PROCEDURE — 94640 AIRWAY INHALATION TREATMENT: CPT

## 2024-06-16 RX ORDER — LIDOCAINE 50 MG/G
1 PATCH TOPICAL DAILY
Status: DISCONTINUED | OUTPATIENT
Start: 2024-06-16 | End: 2024-06-17 | Stop reason: HOSPADM

## 2024-06-16 RX ORDER — HYDROCODONE BITARTRATE AND HOMATROPINE METHYLBROMIDE ORAL SOLUTION 5; 1.5 MG/5ML; MG/5ML
5 LIQUID ORAL EVERY 6 HOURS SCHEDULED
Status: DISCONTINUED | OUTPATIENT
Start: 2024-06-16 | End: 2024-06-17 | Stop reason: HOSPADM

## 2024-06-16 RX ORDER — LEVALBUTEROL INHALATION SOLUTION 1.25 MG/3ML
1.25 SOLUTION RESPIRATORY (INHALATION)
Status: DISCONTINUED | OUTPATIENT
Start: 2024-06-16 | End: 2024-06-16

## 2024-06-16 RX ORDER — HYDROCODONE BITARTRATE AND HOMATROPINE METHYLBROMIDE ORAL SOLUTION 5; 1.5 MG/5ML; MG/5ML
5 LIQUID ORAL DAILY PRN
Status: DISCONTINUED | OUTPATIENT
Start: 2024-06-16 | End: 2024-06-17 | Stop reason: HOSPADM

## 2024-06-16 RX ORDER — LEVALBUTEROL INHALATION SOLUTION 1.25 MG/3ML
1.25 SOLUTION RESPIRATORY (INHALATION)
Status: DISCONTINUED | OUTPATIENT
Start: 2024-06-16 | End: 2024-06-17 | Stop reason: HOSPADM

## 2024-06-16 RX ADMIN — IPRATROPIUM BROMIDE 0.5 MG: 0.5 SOLUTION RESPIRATORY (INHALATION) at 13:20

## 2024-06-16 RX ADMIN — METHOCARBAMOL TABLETS 750 MG: 750 TABLET, COATED ORAL at 17:24

## 2024-06-16 RX ADMIN — LEVALBUTEROL HYDROCHLORIDE 1.25 MG: 1.25 SOLUTION RESPIRATORY (INHALATION) at 13:20

## 2024-06-16 RX ADMIN — Medication 5 ML: at 03:12

## 2024-06-16 RX ADMIN — METOPROLOL SUCCINATE 50 MG: 50 TABLET, EXTENDED RELEASE ORAL at 09:38

## 2024-06-16 RX ADMIN — CLOBETASOL PROPIONATE: 0.5 CREAM TOPICAL at 09:43

## 2024-06-16 RX ADMIN — GUAIFENESIN 1200 MG: 600 TABLET ORAL at 09:38

## 2024-06-16 RX ADMIN — BENZONATATE 100 MG: 100 CAPSULE ORAL at 09:38

## 2024-06-16 RX ADMIN — ALPRAZOLAM 0.25 MG: 0.25 TABLET ORAL at 23:56

## 2024-06-16 RX ADMIN — PANTOPRAZOLE SODIUM 40 MG: 20 TABLET, DELAYED RELEASE ORAL at 06:45

## 2024-06-16 RX ADMIN — HYDROCODONE POLISTIREX AND CHLORPHENIRAMINE POLISTIREX 5 ML: 10; 8 SUSPENSION, EXTENDED RELEASE ORAL at 09:38

## 2024-06-16 RX ADMIN — BENZONATATE 100 MG: 100 CAPSULE ORAL at 21:08

## 2024-06-16 RX ADMIN — HYDROCODONE BITARTRATE AND HOMATROPINE METHYLBROMIDE ORAL SOLUTION 5 ML: 5; 1.5 LIQUID ORAL at 23:56

## 2024-06-16 RX ADMIN — HYDROCODONE BITARTRATE AND HOMATROPINE METHYLBROMIDE ORAL SOLUTION 5 ML: 5; 1.5 LIQUID ORAL at 17:24

## 2024-06-16 RX ADMIN — METHOCARBAMOL TABLETS 750 MG: 750 TABLET, COATED ORAL at 13:33

## 2024-06-16 RX ADMIN — DICLOFENAC SODIUM TOPICAL GEL, 1% 2 G: 10 GEL TOPICAL at 09:41

## 2024-06-16 RX ADMIN — CLOBETASOL PROPIONATE: 0.5 CREAM TOPICAL at 17:25

## 2024-06-16 RX ADMIN — BENZONATATE 100 MG: 100 CAPSULE ORAL at 17:24

## 2024-06-16 RX ADMIN — METHOCARBAMOL TABLETS 750 MG: 750 TABLET, COATED ORAL at 06:45

## 2024-06-16 RX ADMIN — LIDOCAINE 5% 1 PATCH: 700 PATCH TOPICAL at 13:33

## 2024-06-16 RX ADMIN — PREDNISONE 40 MG: 20 TABLET ORAL at 09:38

## 2024-06-16 RX ADMIN — METHOCARBAMOL TABLETS 750 MG: 750 TABLET, COATED ORAL at 23:56

## 2024-06-16 RX ADMIN — PANTOPRAZOLE SODIUM 40 MG: 20 TABLET, DELAYED RELEASE ORAL at 17:24

## 2024-06-16 RX ADMIN — LEVALBUTEROL HYDROCHLORIDE 1.25 MG: 1.25 SOLUTION RESPIRATORY (INHALATION) at 07:13

## 2024-06-16 RX ADMIN — LEVALBUTEROL HYDROCHLORIDE 1.25 MG: 1.25 SOLUTION RESPIRATORY (INHALATION) at 19:33

## 2024-06-16 RX ADMIN — IPRATROPIUM BROMIDE 0.5 MG: 0.5 SOLUTION RESPIRATORY (INHALATION) at 19:33

## 2024-06-16 RX ADMIN — FLUTICASONE PROPIONATE 1 SPRAY: 50 SPRAY, METERED NASAL at 09:40

## 2024-06-16 RX ADMIN — AMLODIPINE BESYLATE 10 MG: 10 TABLET ORAL at 09:38

## 2024-06-16 RX ADMIN — FLUTICASONE FUROATE AND VILANTEROL TRIFENATATE 1 PUFF: 200; 25 POWDER RESPIRATORY (INHALATION) at 09:41

## 2024-06-16 RX ADMIN — ALBUTEROL SULFATE 2 PUFF: 90 AEROSOL, METERED RESPIRATORY (INHALATION) at 06:45

## 2024-06-16 RX ADMIN — ALPRAZOLAM 0.25 MG: 0.25 TABLET ORAL at 09:48

## 2024-06-16 RX ADMIN — HYDROCODONE BITARTRATE AND HOMATROPINE METHYLBROMIDE ORAL SOLUTION 5 ML: 5; 1.5 LIQUID ORAL at 13:32

## 2024-06-16 RX ADMIN — IPRATROPIUM BROMIDE 0.5 MG: 0.5 SOLUTION RESPIRATORY (INHALATION) at 07:13

## 2024-06-16 RX ADMIN — GUAIFENESIN 1200 MG: 600 TABLET ORAL at 21:08

## 2024-06-16 NOTE — RESPIRATORY THERAPY NOTE
06/16/24 0758   Respiratory Assessment   Assessment Type Assess only   General Appearance Alert;Awake   Respiratory Pattern Normal   Chest Assessment Chest expansion symmetrical   Bilateral Breath Sounds Diminished   Resp Comments Xopenex and atrovent changed from Q4 to Q6 so patient can have treatment in the middle of the night due to being up all night from a coughing fit. Patient agrees with plan.   Oxygen Therapy/Pulse Ox   O2 Device None (Room air)   SpO2 95 %   SpO2 Activity At Rest   $ Pulse Oximetry Spot Check Charge Completed

## 2024-06-16 NOTE — PLAN OF CARE
Problem: PAIN - ADULT  Goal: Verbalizes/displays adequate comfort level or baseline comfort level  Description: Interventions:  - Encourage patient to monitor pain and request assistance  - Assess pain using appropriate pain scale  - Administer analgesics based on type and severity of pain and evaluate response  - Implement non-pharmacological measures as appropriate and evaluate response  - Consider cultural and social influences on pain and pain management  - Notify physician/advanced practitioner if interventions unsuccessful or patient reports new pain  Outcome: Progressing     Problem: INFECTION - ADULT  Goal: Absence or prevention of progression during hospitalization  Description: INTERVENTIONS:  - Assess and monitor for signs and symptoms of infection  - Monitor lab/diagnostic results  - Monitor all insertion sites, i.e. indwelling lines, tubes, and drains  - Monitor endotracheal if appropriate and nasal secretions for changes in amount and color  - [place] appropriate cooling/warming therapies per order  - Administer medications as ordered  - Instruct and encourage patient and family to use good hand hygiene technique  - Identify and instruct in appropriate isolation precautions for identified infection/condition  Outcome: Progressing  Goal: Absence of fever/infection during neutropenic period  Description: INTERVENTIONS:  - Monitor WBC    Outcome: Progressing     Problem: SAFETY ADULT  Goal: Patient will remain free of falls  Description: INTERVENTIONS:  - Educate patient/family on patient safety including physical limitations  - Instruct patient to call for assistance with activity   - Consult OT/PT to assist with strengthening/mobility   - Keep Call bell within reach  - Keep bed low and locked with side rails adjusted as appropriate  - Keep care items and personal belongings within reach  - Initiate and maintain comfort rounds  - Make Fall Risk Sign visible to staff  - Offer Toileting every  Hours,  in advance of need  - Initiate/Maintain alarm  - Obtain necessary fall risk management equipment:   - Apply yellow socks and bracelet for high fall risk patients  - Consider moving patient to room near nurses station  Outcome: Progressing  Goal: Maintain or return to baseline ADL function  Description: INTERVENTIONS:  -  Assess patient's ability to carry out ADLs; assess patient's baseline for ADL function and identify physical deficits which impact ability to perform ADLs (bathing, care of mouth/teeth, toileting, grooming, dressing, etc.)  - Assess/evaluate cause of self-care deficits   - Assess range of motion  - Assess patient's mobility; develop plan if impaired  - Assess patient's need for assistive devices and provide as appropriate  - Encourage maximum independence but intervene and supervise when necessary  - Involve family in performance of ADLs  - Assess for home care needs following discharge   - Consider OT consult to assist with ADL evaluation and planning for discharge  - Provide patient education as appropriate  Outcome: Progressing  Goal: Maintains/Returns to pre admission functional level  Description: INTERVENTIONS:  - Perform AM-PAC 6 Click Basic Mobility/ Daily Activity assessment daily.  - Set and communicate daily mobility goal to care team and patient/family/caregiver.   - Collaborate with rehabilitation services on mobility goals if consulted  - Perform Range of Motion  times a day.  - Reposition patient every  hours.  - Dangle patient  times a day  - Stand patient times a day  - Ambulate patient  times a day  - Out of bed to chair  times a day   - Out of bed for meals  times a day  - Out of bed for toileting  - Record patient progress and toleration of activity level   Outcome: Progressing     Problem: DISCHARGE PLANNING  Goal: Discharge to home or other facility with appropriate resources  Description: INTERVENTIONS:  - Identify barriers to discharge w/patient and caregiver  - Arrange for  needed discharge resources and transportation as appropriate  - Identify discharge learning needs (meds, wound care, etc.)  - Arrange for interpretive services to assist at discharge as needed  - Refer to Case Management Department for coordinating discharge planning if the patient needs post-hospital services based on physician/advanced practitioner order or complex needs related to functional status, cognitive ability, or social support system  Outcome: Progressing     Problem: Knowledge Deficit  Goal: Patient/family/caregiver demonstrates understanding of disease process, treatment plan, medications, and discharge instructions  Description: Complete learning assessment and assess knowledge base.  Interventions:  - Provide teaching at level of understanding  - Provide teaching via preferred learning methods  Outcome: Progressing

## 2024-06-16 NOTE — ASSESSMENT & PLAN NOTE
Presented with worsening productive cough, dyspnea, fevers since May 22  Failed outpatient treatment with amoxicillin for 10 days and a 6-day prednisone  Followed by doxycycline for 10 days  Seen PCP today due to worsening symptoms  Unable to be weaned off of oxygen after nebulizer treatments and remained on 2.5 L.  Upon EMS arrival received magnesium, Solu-Medrol, and 3 albuterol nebulizer treatments.   CXR was unremarkable  CTA was negative for PE, diffuse tree-in-bud nodularity, commonly seen in the setting of infection.  Procalcitonin negative, leukocytosis of 13.05, lactic acid 2.5 -> 2.2  S/p rocephin x1 and Azithromycin x1 in the ED  RP2 Negative  Urine Strep and Legionella negative  Sputum: grew 1+ epithelial cells, 2+ polys, 1+ gram positive cocci in pairs, 1+ gram positive rods  No growth in blood    Plan:  Mucinex, Robaxin, benzonate, tussionex, Voltaren gel  Albuterol as needed  Incentive spirometry  Airway clearance protocol  Monitor O2 sat  Pulm consult:  breo begun x 3 months  Continue bronchodilators  Steroid taper prednisone 40, down 10 q3d  Respiratory protocol, out of bed to chair, incentive spirometry  Continue antitussives and mucolytics  Will need outpatient PFTs  Home o2 prior to dc

## 2024-06-16 NOTE — PROGRESS NOTES
Critical access hospital  Progress Note  Name: Marci Lopez I  MRN: [id number]  Unit/Bed#: W -01 I Date of Admission: 6/11/2024   Date of Service: 6/16/2024 I Hospital Day: 5    Assessment & Plan   * Reactive airway disease  Assessment & Plan  Presented with worsening productive cough, dyspnea, fevers since May 22  Failed outpatient treatment with amoxicillin for 10 days and a 6-day prednisone  Followed by doxycycline for 10 days  Seen PCP today due to worsening symptoms  Unable to be weaned off of oxygen after nebulizer treatments and remained on 2.5 L.  Upon EMS arrival received magnesium, Solu-Medrol, and 3 albuterol nebulizer treatments.   CXR was unremarkable  CTA was negative for PE, diffuse tree-in-bud nodularity, commonly seen in the setting of infection.  Procalcitonin negative, leukocytosis of 13.05, lactic acid 2.5 -> 2.2  S/p rocephin x1 and Azithromycin x1 in the ED  RP2 Negative  Urine Strep and Legionella negative  Sputum: grew 1+ epithelial cells, 2+ polys, 1+ gram positive cocci in pairs, 1+ gram positive rods  No growth in blood    Plan:  Mucinex, Robaxin, benzonate, tussionex, Voltaren gel  Albuterol as needed  Incentive spirometry  Airway clearance protocol  Monitor O2 sat  Pulm consult:  breo begun x 3 months  Continue bronchodilators  Steroid taper prednisone 40, down 10 q3d  Respiratory protocol, out of bed to chair, incentive spirometry  Continue antitussives and mucolytics  Will need outpatient PFTs  Home o2 prior to dc    SIRS (systemic inflammatory response syndrome) (HCC)  Assessment & Plan  SIRS criteria with leukocytosis, tachypnea and tachycardia  Likely in setting of lung reaction    Essential hypertension  Assessment & Plan  Continue home meds Amlodipine 10 mg and Toprol XL 50 mg    Diarrhea  Assessment & Plan  Chronic diarrhea due to malabsorption     Psoriasis  Assessment & Plan  Home medication Ultravate; use available conversion     Anxiety  Assessment &  Plan  Assessment:  Pt notes anxiety worsening with cough.    Plan:  Continue Xanax as needed              VTE Pharmacologic Prophylaxis: VTE Score: 6 High Risk (Score >/= 5) - Pharmacological DVT Prophylaxis Ordered: enoxaparin (Lovenox). Sequential Compression Devices Ordered. Pt denies its use thus order dc    Mobility:   Basic Mobility Inpatient Raw Score: 24  JH-HLM Goal: 8: Walk 250 feet or more  JH-HLM Achieved: 8: Walk 250 feet ot more  JH-HLM Goal achieved. Continue to encourage appropriate mobility.    Patient Centered Rounds: I performed bedside rounds with nursing staff today.  Discussions with Specialists or Other Care Team Provider: PulSANTI still    Education and Discussions with Family / Patient: Updated  (significant other) at bedside.    Current Length of Stay: 5 day(s)  Current Patient Status: Inpatient   Discharge Plan: Anticipate discharge tomorrow to home.    Code Status: Level 1 - Full Code    Subjective:   PT reports coughing fit last nite which makes pt anxious / panic, meds adjusted to ideally improve subsequent night. Pt reported improved wheezing but same cough. Pulm discussed expectations today for plans going forward + dc recs. NAD, no new complaints but continued to mention rib & sternum pains, warranting lido.    Objective:     Vitals:   Temp (24hrs), Av.4 °F (36.3 °C), Min:97.1 °F (36.2 °C), Max:97.7 °F (36.5 °C)    Temp:  [97.1 °F (36.2 °C)-97.7 °F (36.5 °C)] 97.7 °F (36.5 °C)  HR:  [57-83] 73  Resp:  [16-20] 16  BP: (109-155)/(61-88) 109/61  SpO2:  [94 %-99 %] 95 %  Body mass index is 33.05 kg/m².     Input and Output Summary (last 24 hours):     Intake/Output Summary (Last 24 hours) at 2024 1528  Last data filed at 2024 0938  Gross per 24 hour   Intake 120 ml   Output --   Net 120 ml       Physical Exam:   Physical Exam  Vitals and nursing note reviewed. Exam conducted with a chaperone present.   Constitutional:       General: She is not in acute distress.      Appearance: She is well-developed. She is not diaphoretic.   HENT:      Head: Normocephalic and atraumatic.      Nose: Nose normal. No congestion or rhinorrhea.      Mouth/Throat:      Pharynx: Oropharynx is clear.   Eyes:      Conjunctiva/sclera: Conjunctivae normal.   Cardiovascular:      Rate and Rhythm: Normal rate and regular rhythm.      Pulses: Normal pulses.      Heart sounds: Normal heart sounds.   Pulmonary:      Effort: Pulmonary effort is normal. No respiratory distress.      Breath sounds: Normal breath sounds.      Comments: Wheezing in all lung fields bilaterally  Chest:      Chest wall: No tenderness.   Abdominal:      Palpations: Abdomen is soft.      Tenderness: There is no abdominal tenderness.   Musculoskeletal:         General: Tenderness present. No swelling.      Cervical back: No rigidity or tenderness.      Right lower leg: No edema.      Left lower leg: No edema.      Comments: Tenderness to palpation of chest and left ribs   Skin:     General: Skin is warm and dry.      Capillary Refill: Capillary refill takes less than 2 seconds.   Neurological:      General: No focal deficit present.      Mental Status: She is alert and oriented to person, place, and time. Mental status is at baseline.      Cranial Nerves: No cranial nerve deficit.      Sensory: No sensory deficit.      Motor: No weakness.          Additional Data:     Labs:  Results from last 7 days   Lab Units 06/13/24  1331   WBC Thousand/uL 13.54*   HEMOGLOBIN g/dL 11.9   HEMATOCRIT % 37.4   PLATELETS Thousands/uL 425*   SEGS PCT % 74   LYMPHO PCT % 15   MONO PCT % 3*   EOS PCT % 6     Results from last 7 days   Lab Units 06/12/24  0512 06/11/24  1304   SODIUM mmol/L 137 141   POTASSIUM mmol/L 3.9 3.5   CHLORIDE mmol/L 106 106   CO2 mmol/L 24 25   BUN mg/dL 19 17   CREATININE mg/dL 0.72 0.78   ANION GAP mmol/L 7 10   CALCIUM mg/dL 9.6 9.4   ALBUMIN g/dL  --  4.1   TOTAL BILIRUBIN mg/dL  --  0.67   ALK PHOS U/L  --  85   ALT U/L  --   18   AST U/L  --  18   GLUCOSE RANDOM mg/dL 155* 129     Results from last 7 days   Lab Units 06/11/24  1304   INR  1.02             Results from last 7 days   Lab Units 06/12/24  0512 06/11/24  1515 06/11/24  1304   LACTIC ACID mmol/L  --  2.2* 2.5*   PROCALCITONIN ng/ml <0.05  --  <0.05       Lines/Drains:  Invasive Devices       Peripheral Intravenous Line  Duration             Peripheral IV 06/11/24 Left Antecubital 5 days                          Imaging: Reviewed radiology reports from this admission including: chest xray and chest CT scan    Recent Cultures (last 7 days):   Results from last 7 days   Lab Units 06/11/24  2124 06/11/24  1840 06/11/24  1325 06/11/24  1304   BLOOD CULTURE   --   --  No Growth After 4 Days. No Growth After 4 Days.   SPUTUM CULTURE   --  2+ Growth of  --   --    GRAM STAIN RESULT   --  1+ Epithelial cells per low power field*  2+ Polys*  1+ Gram positive cocci in pairs*  1+ Gram positive rods*  --   --    LEGIONELLA URINARY ANTIGEN  Negative  --   --   --        Last 24 Hours Medication List:   Current Facility-Administered Medications   Medication Dose Route Frequency Provider Last Rate    albuterol  2 puff Inhalation Q4H PRN Kassie Horton MD      ALPRAZolam  0.25 mg Oral 4x Daily PRN Noe Bell, DO      amLODIPine  10 mg Oral Daily Kassie Horton MD      benzonatate  100 mg Oral TID Brandy Dahl DO      butalbital-acetaminophen-caffeine  1 tablet Oral Q4H PRN Noe Bell, DO      calcium carbonate  500 mg Oral Daily PRN Eric Burks MD      clobetasol   Topical BID Kassie Horton MD      dextromethorphan-guaiFENesin  10 mL Oral Q4H PRN Noe Bell, DO      Diclofenac Sodium  2 g Topical 4x Daily Eric Burks MD      fluticasone  1 spray Each Nare Daily Liv Berumen MD      fluticasone-vilanterol  1 puff Inhalation Daily Brandy Dahl DO      guaiFENesin  1,200 mg Oral Q12H [facility] Eric Burks MD       HYDROcodone Bit-[place] MBr  5 mL Oral Q6H [facility] Noe Bell,       HYDROcodone Bit-Homatrop MBr  5 mL Oral Daily PRN Noe Bell DO      ipratropium  0.5 mg Nebulization Q6H Colton Spence MD      levalbuterol  1.25 mg Nebulization Q6H Colton Spence MD      lidocaine  1 patch Topical Daily Noe Bell DO      methocarbamol  750 mg Oral Q6H [facility] Noe Bell DO      metoprolol succinate  50 mg Oral Daily Kassie Horton MD      pantoprazole  40 mg Oral BID AC Liv Berumen MD      phenol  1 spray Mouth/Throat Q2H PRN Liv Berumen MD      predniSONE  40 mg Oral Daily Kyle Brunner, MD          Today, Patient Was Seen By: Noe Bell DO    **Please Note: This note may have been constructed using a voice recognition system.**

## 2024-06-16 NOTE — PROGRESS NOTES
"Progress Note - Pulmonary   Marci Lopez 54 y.o. female MRN: [id number]  Unit/Bed#: W -01 Encounter: [id number]    Assessment/Plan:    Acute respiratory insufficiency  -Currently 95% on room air, patient does not wear any supplemental O2 at home  -Continue to maintain saturation greater than 89%  -Pulmonary hygiene encouraged: Deep breathing with cough, OOB as tolerated, incentive spirometry and flutter valve  -Home O2 evaluation prior to DC     Reactive airway disease  -Will need outpt follow up and formal PFTs  -Prednisone 40 mg-discharged home on prednisone 40 mg taper by 10 mg every 3 days  -Continue with Xopenex and Atrovent every 6 hours  -Discharge home with Breo, albuterol rescue inhaler and nebulizers  -Will need nebulizer prescription at discharge    Suspected pneumonia  -CTA 6/11/2024 shows diffuse tree-in-bud nodularity  -WBC 13.41-13.54  -Procalcitonin negative x 2  -Was treated with amoxicillin and doxycycline outpatient  -Will continue to monitor off of antibiotics at this time  -Continue Mucinex, Tussionex and Tessalon Perles for persistent cough    Tobacco abuse  -Recently vaped while in [facility] for the first time  -Suspected EVALI  -Smoking cessation discussed  -NRT options per primary care team    Chief Complaint:    I had coughing attack last night    Subjective:    Pt seen and examined at bedside.  Found sitting up in bed resting comfortably.  Complained off worsening coughing fit overnight.  Denies any fever chills night sweats chest pain or hemoptysis    Objective:    Vitals: Blood pressure 136/79, pulse 80, temperature (!) 97.1 °F (36.2 °C), resp. rate 20, height 5' 5\" (1.651 m), weight 90.1 kg (198 lb 10.2 oz), SpO2 95%.RA,Body mass index is 33.05 kg/m².      Intake/Output Summary (Last 24 hours) at 6/16/2024 1227  Last data filed at 6/16/2024 0938  Gross per 24 hour   Intake 120 ml   Output --   Net 120 ml       Invasive Devices       Peripheral Intravenous Line  Duration          " "   Peripheral IV 06/11/24 Left Antecubital 4 days                    Physical Exam:     Physical Exam  Vitals reviewed.   Constitutional:       General: She is not in acute distress.     Appearance: She is obese. She is not ill-appearing.   HENT:      Head: Normocephalic and atraumatic.      Right Ear: External ear normal.      Left Ear: External ear normal.      Nose: Nose normal.      Mouth/Throat:      Mouth: Mucous membranes are moist.      Pharynx: Oropharynx is clear.   Eyes:      Extraocular Movements: Extraocular movements intact.      Pupils: Pupils are equal, round, and reactive to light.   Cardiovascular:      Rate and Rhythm: Normal rate and regular rhythm.   Pulmonary:      Effort: Pulmonary effort is normal.      Breath sounds: Wheezing present.      Comments: Faint expiratory wheezes  Abdominal:      General: Bowel sounds are normal.   Musculoskeletal:         General: Normal range of motion.      Cervical back: Normal range of motion and neck supple.      Right lower leg: No edema.      Left lower leg: No edema.   Skin:     General: Skin is warm and dry.   Neurological:      Mental Status: She is alert and oriented to person, place, and time.   Psychiatric:         Mood and Affect: Mood normal.         Behavior: Behavior normal.         Thought Content: Thought content normal.         Judgment: Judgment normal.         Labs: I have personally reviewed pertinent lab results., ABG: No results found for: \"PHART\", \"YBV7JMV\", \"PO2ART\", \"AVE8MDI\", \"H2NGOVYI\", \"BEART\", \"SOURCE\", BNP: No results found for: \"BNP\", CBC: No results found for: \"WBC\", \"HGB\", \"HCT\", \"MCV\", \"PLT\", \"ADJUSTEDWBC\", \"RBC\", \"MCH\", \"MCHC\", \"RDW\", \"MPV\", \"NRBC\", CMP: No results found for: \"SODIUM\", \"K\", \"CL\", \"CO2\", \"ANIONGAP\", \"BUN\", \"CREATININE\", \"GLUCOSE\", \"CALCIUM\", \"AST\", \"ALT\", \"ALKPHOS\", \"PROT\", \"BILITOT\", \"EGFR\"      Imaging and other studies: I have personally reviewed pertinent reports.    CTA chest PE study 06/11/2024   No " pulmonary embolism.   Diffuse tree-in-bud nodularity. Most commonly seen in the setting of infection.   Small hiatal hernia

## 2024-06-17 ENCOUNTER — TELEPHONE (OUTPATIENT)
Age: 54
End: 2024-06-17

## 2024-06-17 VITALS
BODY MASS INDEX: 33.09 KG/M2 | DIASTOLIC BLOOD PRESSURE: 86 MMHG | SYSTOLIC BLOOD PRESSURE: 116 MMHG | RESPIRATION RATE: 16 BRPM | HEART RATE: 88 BPM | TEMPERATURE: 97.4 F | HEIGHT: 65 IN | WEIGHT: 198.63 LBS | OXYGEN SATURATION: 96 %

## 2024-06-17 DIAGNOSIS — R05.1 ACUTE COUGH: Primary | ICD-10-CM

## 2024-06-17 PROBLEM — R19.7 DIARRHEA: Status: RESOLVED | Noted: 2024-06-11 | Resolved: 2024-06-17

## 2024-06-17 PROBLEM — R65.10 SIRS (SYSTEMIC INFLAMMATORY RESPONSE SYNDROME) (HCC): Status: RESOLVED | Noted: 2024-06-11 | Resolved: 2024-06-17

## 2024-06-17 PROCEDURE — 99239 HOSP IP/OBS DSCHRG MGMT >30: CPT | Performed by: INTERNAL MEDICINE

## 2024-06-17 PROCEDURE — 94640 AIRWAY INHALATION TREATMENT: CPT

## 2024-06-17 PROCEDURE — 94761 N-INVAS EAR/PLS OXIMETRY MLT: CPT

## 2024-06-17 PROCEDURE — 94760 N-INVAS EAR/PLS OXIMETRY 1: CPT

## 2024-06-17 RX ORDER — PREDNISONE 20 MG/1
20 TABLET ORAL DAILY
Status: DISCONTINUED | OUTPATIENT
Start: 2024-06-22 | End: 2024-06-17 | Stop reason: HOSPADM

## 2024-06-17 RX ORDER — LEVALBUTEROL INHALATION SOLUTION 1.25 MG/3ML
1.25 SOLUTION RESPIRATORY (INHALATION) EVERY 8 HOURS PRN
Qty: 90 ML | Refills: 0 | Status: SHIPPED | OUTPATIENT
Start: 2024-06-17

## 2024-06-17 RX ORDER — FLUTICASONE FUROATE, UMECLIDINIUM BROMIDE AND VILANTEROL TRIFENATATE 200; 62.5; 25 UG/1; UG/1; UG/1
1 POWDER RESPIRATORY (INHALATION) DAILY
Qty: 60 BLISTER | Refills: 0 | Status: SHIPPED | OUTPATIENT
Start: 2024-06-17 | End: 2024-07-17

## 2024-06-17 RX ORDER — PREDNISONE 10 MG/1
10 TABLET ORAL DAILY
Status: DISCONTINUED | OUTPATIENT
Start: 2024-06-25 | End: 2024-06-17 | Stop reason: HOSPADM

## 2024-06-17 RX ORDER — PREDNISONE 10 MG/1
TABLET ORAL
Qty: 22 TABLET | Refills: 0 | Status: SHIPPED | OUTPATIENT
Start: 2024-06-18 | End: 2024-06-27

## 2024-06-17 RX ORDER — METHOCARBAMOL 750 MG/1
750 TABLET, FILM COATED ORAL EVERY 8 HOURS PRN
Qty: 20 TABLET | Refills: 0 | Status: SHIPPED | OUTPATIENT
Start: 2024-06-17

## 2024-06-17 RX ORDER — BENZONATATE 100 MG/1
100 CAPSULE ORAL 3 TIMES DAILY
Qty: 20 CAPSULE | Refills: 0 | Status: SHIPPED | OUTPATIENT
Start: 2024-06-17

## 2024-06-17 RX ORDER — PREDNISONE 20 MG/1
40 TABLET ORAL DAILY
Status: DISCONTINUED | OUTPATIENT
Start: 2024-06-18 | End: 2024-06-17 | Stop reason: HOSPADM

## 2024-06-17 RX ORDER — GUAIFENESIN/DEXTROMETHORPHAN 100-10MG/5
10 SYRUP ORAL 4 TIMES DAILY PRN
Qty: 473 ML | Refills: 0 | Status: SHIPPED | OUTPATIENT
Start: 2024-06-17

## 2024-06-17 RX ORDER — FLUTICASONE FUROATE AND VILANTEROL 200; 25 UG/1; UG/1
1 POWDER RESPIRATORY (INHALATION) DAILY
Qty: 60 BLISTER | Refills: 0 | Status: CANCELLED | OUTPATIENT
Start: 2024-06-18

## 2024-06-17 RX ORDER — HYDROCODONE POLISTIREX AND CHLORPHENIRAMINE POLISTIREX 10; 8 MG/5ML; MG/5ML
5 SUSPENSION, EXTENDED RELEASE ORAL EVERY 12 HOURS PRN
Qty: 115 ML | Refills: 0 | Status: SHIPPED | OUTPATIENT
Start: 2024-06-17

## 2024-06-17 RX ADMIN — HYDROCODONE BITARTRATE AND HOMATROPINE METHYLBROMIDE ORAL SOLUTION 5 ML: 5; 1.5 LIQUID ORAL at 12:33

## 2024-06-17 RX ADMIN — AMLODIPINE BESYLATE 10 MG: 10 TABLET ORAL at 08:41

## 2024-06-17 RX ADMIN — PREDNISONE 40 MG: 20 TABLET ORAL at 08:41

## 2024-06-17 RX ADMIN — FLUTICASONE FUROATE AND VILANTEROL TRIFENATATE 1 PUFF: 200; 25 POWDER RESPIRATORY (INHALATION) at 08:42

## 2024-06-17 RX ADMIN — METHOCARBAMOL TABLETS 750 MG: 750 TABLET, COATED ORAL at 05:53

## 2024-06-17 RX ADMIN — IPRATROPIUM BROMIDE 0.5 MG: 0.5 SOLUTION RESPIRATORY (INHALATION) at 01:18

## 2024-06-17 RX ADMIN — DICLOFENAC SODIUM TOPICAL GEL, 1% 2 G: 10 GEL TOPICAL at 08:42

## 2024-06-17 RX ADMIN — PANTOPRAZOLE SODIUM 40 MG: 20 TABLET, DELAYED RELEASE ORAL at 06:20

## 2024-06-17 RX ADMIN — LEVALBUTEROL HYDROCHLORIDE 1.25 MG: 1.25 SOLUTION RESPIRATORY (INHALATION) at 13:31

## 2024-06-17 RX ADMIN — FLUTICASONE PROPIONATE 1 SPRAY: 50 SPRAY, METERED NASAL at 08:42

## 2024-06-17 RX ADMIN — IPRATROPIUM BROMIDE 0.5 MG: 0.5 SOLUTION RESPIRATORY (INHALATION) at 07:15

## 2024-06-17 RX ADMIN — METOPROLOL SUCCINATE 50 MG: 50 TABLET, EXTENDED RELEASE ORAL at 08:41

## 2024-06-17 RX ADMIN — METHOCARBAMOL TABLETS 750 MG: 750 TABLET, COATED ORAL at 12:32

## 2024-06-17 RX ADMIN — BENZONATATE 100 MG: 100 CAPSULE ORAL at 08:41

## 2024-06-17 RX ADMIN — GUAIFENESIN 1200 MG: 600 TABLET ORAL at 08:41

## 2024-06-17 RX ADMIN — LEVALBUTEROL HYDROCHLORIDE 1.25 MG: 1.25 SOLUTION RESPIRATORY (INHALATION) at 01:18

## 2024-06-17 RX ADMIN — CLOBETASOL PROPIONATE: 0.5 CREAM TOPICAL at 08:41

## 2024-06-17 RX ADMIN — HYDROCODONE BITARTRATE AND HOMATROPINE METHYLBROMIDE ORAL SOLUTION 5 ML: 5; 1.5 LIQUID ORAL at 05:52

## 2024-06-17 RX ADMIN — LEVALBUTEROL HYDROCHLORIDE 1.25 MG: 1.25 SOLUTION RESPIRATORY (INHALATION) at 07:15

## 2024-06-17 RX ADMIN — IPRATROPIUM BROMIDE 0.5 MG: 0.5 SOLUTION RESPIRATORY (INHALATION) at 13:31

## 2024-06-17 NOTE — DISCHARGE SUMMARY
[facility]  Discharge- Marci Lopez 1970, 54 y.o. female MRN: [id number]  Unit/Bed#: W -01 Encounter: [id number]  Primary Care Provider: Farhana Tompkins DO   Date and time admitted to hospital: 6/11/2024 12:56 PM    * Reactive airway disease  Assessment & Plan  Presented with worsening productive cough, dyspnea, fevers since May 22  Failed outpatient treatment with amoxicillin for 10 days and a 6-day prednisone  Followed by doxycycline for 10 days  Seen PCP today due to worsening symptoms  Unable to be weaned off of oxygen after nebulizer treatments and remained on 2.5 L.  Upon EMS arrival received magnesium, Solu-Medrol, and 3 albuterol nebulizer treatments.   CXR was unremarkable  CTA was negative for PE, diffuse tree-in-bud nodularity, commonly seen in the setting of infection.  Procalcitonin negative, leukocytosis of 13.05, lactic acid 2.5 -> 2.2  S/p rocephin x1 and Azithromycin x1 in the ED  RP2 Negative  Urine Strep and Legionella negative  Sputum: grew 1+ epithelial cells, 2+ polys, 1+ gram positive cocci in pairs, 1+ gram positive rods  No growth in blood  Home O2 Eval: no O2 needed    Plan:  Robaxin, benzonate, Voltaren gel  Albuterol as needed  Incentive spirometry  Airway clearance protocol  Monitor O2 sat, ok for dc  Pulm consult:  breo begun, switched to [facility] due to insurance issues, x 3 months  Continue bronchodilators  Steroid taper prednisone 40, down 10 q3d  Respiratory protocol, out of bed to chair, incentive spirometry  Continue antitussives and mucolytics  Will need outpatient PFTs    SIRS (systemic inflammatory response syndrome) (HCC)-resolved as of 6/17/2024  Assessment & Plan  SIRS criteria with leukocytosis, tachypnea and tachycardia  Likely in setting of lung reaction, resolved    Essential hypertension  Assessment & Plan  Continue home meds Amlodipine 10 mg and Toprol XL 50 mg    Diarrhea-resolved as of 6/17/2024  Assessment & Plan  Chronic  diarrhea due to malabsorption     Psoriasis  Assessment & Plan  Home medication Ultravate; use available conversion     Anxiety  Assessment & Plan  Assessment:  Pt notes anxiety worsening with cough.    Plan:  Continue Xanax as needed       Medical Problems       Resolved Problems  Date Reviewed: 6/17/2024            Resolved    Diarrhea 6/17/2024     Resolved by  Noe Bell DO    SIRS (systemic inflammatory response syndrome) (HCC) 6/17/2024     Resolved by  Noe Bell DO        Discharging Physician / Practitioner: Noe Bell DO  PCP: Farhana Tompkins DO  Admission Date:   Admission Orders (From admission, onward)       Ordered        06/11/24 1516  INPATIENT ADMISSION  Once                          Discharge Date: 06/17/24    Consultations During Hospital Stay:  Pulmonology    Procedures Performed:   Home O2 eval    Significant Findings / Test Results:   No blood culture growth  Sputum culture likely contaminant  Procalcitonin 2x neg    Incidental Findings:   none    Test Results Pending at Discharge (will require follow up):   none     Outpatient Tests Requested:  PFTs    Complications: None    Reason for Admission: severe cough / SOB w/o improvement x 3-4 weeks.    Hospital Course:   Marci Lopez is a 54 y.o. female patient who originally presented to the hospital on 6/11/2024 due to cough and shortness of breath. Began at the end of May after returning from a 2wk long trip to [facility]. She developed a sore throat for which she saw her PCP 5/22, prescribed amoxicillin. Upon worsening of sx she returned 5/31 where she was switched to doxy and a 6d course of steroids. Sore throat resolved but she developed worsening forceful cough productive of sputum, fevers, chills, dizziness, headaches prompting ED visit 6/11. Met SIRS criteria, started on ceftriaxone and azithromycin, given albuterol & atrovent. Viral P2 panel, strep pneumo and legionella all negative. CXR was clear, and CTA had findings suspicious for  "infection, no PE suspected. Procalcitonin neg 2x. Was discontinued from abx on day 2 of hospitalization and continued on cough and breathing treatment regimen. Pulmonology was consulted, suspect reactive airway disease vs. Acute respiratory insufficiency. Recommended prednisone with taper at home, breo 3mo, Xopenex Q6, atrovent Q6 with albuterol rescue inhaler.    Please see above list of diagnoses and related plan for additional information.     Condition at Discharge: good    Discharge Day Visit / Exam:   Subjective:  No acute events overnight. Had some episodes of post-tussive vomiting when attempting to eat meals in days prior. Overall feels much better. Coughing with much less sputum production. Has ongoing MSK pain in chest and left ribs from coughing, somewhat relieved with Voltaren gel. Denies diarrhea, fevers, chills, SOB, dizziness, trouble walking.    Vitals: Blood Pressure: 116/86 (06/17/24 0745)  Pulse: 88 (06/17/24 0745)  Temperature: (!) 97.4 °F (36.3 °C) (06/17/24 0745)  Temp Source: Oral (06/16/24 1502)  Respirations: 16 (06/16/24 2244)  Height: 5' 5\" (165.1 cm) (06/11/24 2227)  Weight - Scale: 90.1 kg (198 lb 10.2 oz) (06/11/24 2227)  SpO2: 96 % (06/17/24 1331)  Exam:   Physical Exam  Vitals and nursing note reviewed.   Constitutional:       General: She is not in acute distress.     Appearance: She is well-developed.      Interventions: She is not intubated.  HENT:      Head: Normocephalic and atraumatic.   Eyes:      Conjunctiva/sclera: Conjunctivae normal.   Cardiovascular:      Rate and Rhythm: Normal rate and regular rhythm.      Heart sounds: No murmur heard.  Pulmonary:      Effort: Pulmonary effort is normal. No tachypnea, bradypnea, accessory muscle usage, prolonged expiration, respiratory distress or retractions. She is not intubated.      Breath sounds: Normal breath sounds and air entry. No stridor, decreased air movement or transmitted upper airway sounds. No decreased breath sounds, " wheezing, rhonchi or rales.      Comments: Faint wheezes bilaterally  Abdominal:      Palpations: Abdomen is soft.      Tenderness: There is no abdominal tenderness.   Musculoskeletal:         General: Tenderness present. No swelling.      Cervical back: Neck supple.      Right lower leg: No edema.      Left lower leg: No edema.      Comments: Tenderness to palpation of sternum and left ribs   Skin:     General: Skin is warm and dry.      Capillary Refill: Capillary refill takes less than 2 seconds.   Neurological:      Mental Status: She is alert.   Psychiatric:         Mood and Affect: Mood normal.          Discussion with Family: Patient declined call to .     Discharge instructions/Information to patient and family:   See after visit summary for information provided to patient and family.      Provisions for Follow-Up Care:  See after visit summary for information related to follow-up care and any pertinent home health orders.      Mobility at time of Discharge:   Basic Mobility Inpatient Raw Score: 24  JH-HLM Goal: 8: Walk 250 feet or more  JH-HLM Achieved: 8: Walk 250 feet ot more  HLM Goal achieved. Continue to encourage appropriate mobility.     Disposition:   Home    Planned Readmission: none     Discharge Statement:  I spent 30 minutes discharging the patient. This time was spent on the day of discharge. I had direct contact with the patient on the day of discharge. Greater than 50% of the total time was spent examining patient, answering all patient questions, arranging and discussing plan of care with patient as well as directly providing post-discharge instructions.  Additional time then spent on discharge activities.    Discharge Medications:  See after visit summary for reconciled discharge medications provided to patient and/or family.      **Please Note: This note may have been constructed using a voice recognition system**

## 2024-06-17 NOTE — PLAN OF CARE
Problem: PAIN - ADULT  Goal: Verbalizes/displays adequate comfort level or baseline comfort level  Description: Interventions:  - Encourage patient to monitor pain and request assistance  - Assess pain using appropriate pain scale  - Administer analgesics based on type and severity of pain and evaluate response  - Implement non-pharmacological measures as appropriate and evaluate response  - Consider cultural and social influences on pain and pain management  - Notify physician/advanced practitioner if interventions unsuccessful or patient reports new pain  Outcome: Progressing     Problem: INFECTION - ADULT  Goal: Absence or prevention of progression during hospitalization  Description: INTERVENTIONS:  - Assess and monitor for signs and symptoms of infection  - Monitor lab/diagnostic results  - Monitor all insertion sites, i.e. indwelling lines, tubes, and drains  - Monitor endotracheal if appropriate and nasal secretions for changes in amount and color  - [place] appropriate cooling/warming therapies per order  - Administer medications as ordered  - Instruct and encourage patient and family to use good hand hygiene technique  - Identify and instruct in appropriate isolation precautions for identified infection/condition  Outcome: Progressing  Goal: Absence of fever/infection during neutropenic period  Description: INTERVENTIONS:  - Monitor WBC    Outcome: Progressing     Problem: SAFETY ADULT  Goal: Patient will remain free of falls  Description: INTERVENTIONS:  - Educate patient/family on patient safety including physical limitations  - Instruct patient to call for assistance with activity   - Consult OT/PT to assist with strengthening/mobility   - Keep Call bell within reach  - Keep bed low and locked with side rails adjusted as appropriate  - Keep care items and personal belongings within reach  - Initiate and maintain comfort rounds  - Make Fall Risk Sign visible to staff  - Offer Toileting every  Hours,  in advance of need  - Initiate/Maintain alarm  - Obtain necessary fall risk management equipment:   - Apply yellow socks and bracelet for high fall risk patients  - Consider moving patient to room near nurses station  Outcome: Progressing  Goal: Maintain or return to baseline ADL function  Description: INTERVENTIONS:  -  Assess patient's ability to carry out ADLs; assess patient's baseline for ADL function and identify physical deficits which impact ability to perform ADLs (bathing, care of mouth/teeth, toileting, grooming, dressing, etc.)  - Assess/evaluate cause of self-care deficits   - Assess range of motion  - Assess patient's mobility; develop plan if impaired  - Assess patient's need for assistive devices and provide as appropriate  - Encourage maximum independence but intervene and supervise when necessary  - Involve family in performance of ADLs  - Assess for home care needs following discharge   - Consider OT consult to assist with ADL evaluation and planning for discharge  - Provide patient education as appropriate  Outcome: Progressing  Goal: Maintains/Returns to pre admission functional level  Description: INTERVENTIONS:  - Perform AM-PAC 6 Click Basic Mobility/ Daily Activity assessment daily.  - Set and communicate daily mobility goal to care team and patient/family/caregiver.   - Collaborate with rehabilitation services on mobility goals if consulted  - Perform Range of Motion  times a day.  - Reposition patient every  hours.  - Dangle patient  times a day  - Stand patient  times a day  - Ambulate patient  times a day  - Out of bed to chair  times a day   - Out of bed for meals  times a day  - Out of bed for toileting  - Record patient progress and toleration of activity level   Outcome: Progressing     Problem: DISCHARGE PLANNING  Goal: Discharge to home or other facility with appropriate resources  Description: INTERVENTIONS:  - Identify barriers to discharge w/patient and caregiver  - Arrange for  needed discharge resources and transportation as appropriate  - Identify discharge learning needs (meds, wound care, etc.)  - Arrange for interpretive services to assist at discharge as needed  - Refer to Case Management Department for coordinating discharge planning if the patient needs post-hospital services based on physician/advanced practitioner order or complex needs related to functional status, cognitive ability, or social support system  Outcome: Progressing     Problem: Knowledge Deficit  Goal: Patient/family/caregiver demonstrates understanding of disease process, treatment plan, medications, and discharge instructions  Description: Complete learning assessment and assess knowledge base.  Interventions:  - Provide teaching at level of understanding  - Provide teaching via preferred learning methods  Outcome: Progressing

## 2024-06-17 NOTE — DISCHARGE INSTR - AVS FIRST PAGE
Dear Marci Lopez,     It was our pleasure to care for you here at [facility].  It is our hope that we were always able to exceed the expected standards for your care during your stay.  You were hospitalized due to reactive airway disease.  You were cared for on the W4 floor by Noe Bell DO under the service of Gabe Schroeder* with the [facility] who covers for your primary care physician (PCP), Farhana Tompkins DO, while you were hospitalized.  If you have any questions or concerns related to this hospitalization, you may contact us at .  For follow up as well as any medication refills, we recommend that you follow up with your primary care physician.  A registered nurse will reach out to you by phone within a few days after your discharge to answer any additional questions that you may have after going home.  However, at this time we provide for you here, the most important instructions / recommendations at discharge:     Notable Medication Adjustments -   Take Benzonatate 100 mg capsule up to 3 times daily for cough  Use the 2 provided nebulizer solutions every 8 hours as needed for wheezing  Use the prescribed Trelegy inhaler for 1 puff daily until discontinued by pulmonologist  Continue taking methocarbamol 750 mg up to every 8 hours as needed for muscle spasms  Use rescue albuterol inhaler as needed for shortness of breath  Continue taking Robitussin DM 10 ml as needed for cough   Continue prednisone taper as prescribed, taking 40 mg tomorrow, followed by 30 mg daily for 3 days, then 20 mg daily for 3 daily, finally 10 mg for 3 days.  Testing Required after Discharge -   PFT with pulmonologist  Important follow up information -   Please visit with pulmonologist at scheduled appointment soon after discharge  Visit with your PCP within a few days of hospital discharge  Other Instructions -   We hope you feel better soon. If  your symptoms worsen, please call 911 immediately or go to the nearest Emergency Department.  Please review this entire after visit summary as additional general instructions including medication list, appointments, activity, diet, any pertinent wound care, and other additional recommendations from your care team that may be provided for you.      Sincerely,     Noe Bell, DO

## 2024-06-17 NOTE — TELEPHONE ENCOUNTER
Pt stated she was discharged from hospital 6/17/24.   Pt also stated she needs a refill of the cough med that had codeine in it ( not sure of name) or she will have an asthma attack. Please send med to:   [id number] [place] [address] [place]        Thank you for your help.

## 2024-06-17 NOTE — ASSESSMENT & PLAN NOTE
Presented with worsening productive cough, dyspnea, fevers since May 22  Failed outpatient treatment with amoxicillin for 10 days and a 6-day prednisone  Followed by doxycycline for 10 days  Seen PCP today due to worsening symptoms  Unable to be weaned off of oxygen after nebulizer treatments and remained on 2.5 L.  Upon EMS arrival received magnesium, Solu-Medrol, and 3 albuterol nebulizer treatments.   CXR was unremarkable  CTA was negative for PE, diffuse tree-in-bud nodularity, commonly seen in the setting of infection.  Procalcitonin negative, leukocytosis of 13.05, lactic acid 2.5 -> 2.2  S/p rocephin x1 and Azithromycin x1 in the ED  RP2 Negative  Urine Strep and Legionella negative  Sputum: grew 1+ epithelial cells, 2+ polys, 1+ gram positive cocci in pairs, 1+ gram positive rods  No growth in blood  Home O2 Eval: no O2 needed    Plan:  Robaxin, benzonate, Voltaren gel  Albuterol as needed  Incentive spirometry  Airway clearance protocol  Monitor O2 sat, ok for dc  Pulm consult:  breo begun, switched to [facility] due to insurance issues, x 3 months  Continue bronchodilators  Steroid taper prednisone 40, down 10 q3d  Respiratory protocol, out of bed to chair, incentive spirometry  Continue antitussives and mucolytics  Will need outpatient PFTs

## 2024-06-17 NOTE — ASSESSMENT & PLAN NOTE
SIRS criteria with leukocytosis, tachypnea and tachycardia  Likely in setting of lung reaction, resolved

## 2024-06-17 NOTE — TELEPHONE ENCOUNTER
Please let her know that I sent in Tussionex to [facility]  Thank you,  Farhana Tompkins, DO

## 2024-06-17 NOTE — PLAN OF CARE
Problem: PAIN - ADULT  Goal: Verbalizes/displays adequate comfort level or baseline comfort level  Description: Interventions:  - Encourage patient to monitor pain and request assistance  - Assess pain using appropriate pain scale  - Administer analgesics based on type and severity of pain and evaluate response  - Implement non-pharmacological measures as appropriate and evaluate response  - Consider cultural and social influences on pain and pain management  - Notify physician/advanced practitioner if interventions unsuccessful or patient reports new pain  Outcome: Progressing     Problem: INFECTION - ADULT  Goal: Absence or prevention of progression during hospitalization  Description: INTERVENTIONS:  - Assess and monitor for signs and symptoms of infection  - Monitor lab/diagnostic results  - Monitor all insertion sites, i.e. indwelling lines, tubes, and drains  - Monitor endotracheal if appropriate and nasal secretions for changes in amount and color  - [place] appropriate cooling/warming therapies per order  - Administer medications as ordered  - Instruct and encourage patient and family to use good hand hygiene technique  - Identify and instruct in appropriate isolation precautions for identified infection/condition  Outcome: Progressing  Goal: Absence of fever/infection during neutropenic period  Description: INTERVENTIONS:  - Monitor WBC    Outcome: Progressing     Problem: SAFETY ADULT  Goal: Patient will remain free of falls  Description: INTERVENTIONS:  - Educate patient/family on patient safety including physical limitations  - Instruct patient to call for assistance with activity   - Consult OT/PT to assist with strengthening/mobility   - Keep Call bell within reach  - Keep bed low and locked with side rails adjusted as appropriate  - Keep care items and personal belongings within reach  - Initiate and maintain comfort rounds  - Make Fall Risk Sign visible to staff  - Offer Toileting every  Hours,  in advance of need  - Initiate/Maintain alarm  - Obtain necessary fall risk management equipment:   - Apply yellow socks and bracelet for high fall risk patients  - Consider moving patient to room near nurses station  Outcome: Progressing  Goal: Maintain or return to baseline ADL function  Description: INTERVENTIONS:  -  Assess patient's ability to carry out ADLs; assess patient's baseline for ADL function and identify physical deficits which impact ability to perform ADLs (bathing, care of mouth/teeth, toileting, grooming, dressing, etc.)  - Assess/evaluate cause of self-care deficits   - Assess range of motion  - Assess patient's mobility; develop plan if impaired  - Assess patient's need for assistive devices and provide as appropriate  - Encourage maximum independence but intervene and supervise when necessary  - Involve family in performance of ADLs  - Assess for home care needs following discharge   - Consider OT consult to assist with ADL evaluation and planning for discharge  - Provide patient education as appropriate  Outcome: Progressing  Goal: Maintains/Returns to pre admission functional level  Description: INTERVENTIONS:  - Perform AM-PAC 6 Click Basic Mobility/ Daily Activity assessment daily.  - Set and communicate daily mobility goal to care team and patient/family/caregiver.   - Collaborate with rehabilitation services on mobility goals if consulted  - Perform Range of Motion times a day.  - Reposition patient every  hours.  - Dangle patient  times a day  - Stand patient  times a day  - Ambulate patient  times a day  - Out of bed to chair times a day   - Out of bed for meals times a day  - Out of bed for toileting  - Record patient progress and toleration of activity level   Outcome: Progressing     Problem: DISCHARGE PLANNING  Goal: Discharge to home or other facility with appropriate resources  Description: INTERVENTIONS:  - Identify barriers to discharge w/patient and caregiver  - Arrange for  needed discharge resources and transportation as appropriate  - Identify discharge learning needs (meds, wound care, etc.)  - Arrange for interpretive services to assist at discharge as needed  - Refer to Case Management Department for coordinating discharge planning if the patient needs post-hospital services based on physician/advanced practitioner order or complex needs related to functional status, cognitive ability, or social support system  Outcome: Progressing     Problem: Knowledge Deficit  Goal: Patient/family/caregiver demonstrates understanding of disease process, treatment plan, medications, and discharge instructions  Description: Complete learning assessment and assess knowledge base.  Interventions:  - Provide teaching at level of understanding  - Provide teaching via preferred learning methods  Outcome: Progressing

## 2024-06-17 NOTE — NURSING NOTE
Discharge papers reviewed with patient by previous RN. Patient picked up with all belongings by family. Masimo and IV removed.

## 2024-06-17 NOTE — RESPIRATORY THERAPY NOTE
Home Oxygen Qualifying Test     Patient name: Marci Lopez        : 1970   Date of Test:  2024  Diagnosis:    Home Oxygen Test:    **Medicare Guidelines require item(s) 1-5 on all ambulatory patients or 1 and 2 on non-ambulatory patients.    1. Baseline SPO2 on Room Air at rest 99 %     SPO2 during exertion on Room Air 96  %  During exertion monitor SPO2. If SPO2 increases >=89%, do not add supplemental oxygen      []  Supplemental Home Oxygen is indicated.    [x]  Client does not qualify for home oxygen.    Respiratory Additional Notes- Pt tolerated well. Does not qualify for Home Oxygen     Echo Combs, RT

## 2024-06-17 NOTE — PROGRESS NOTES
PULMONOLOGY PROGRESS NOTE     Name: Marci Lopez   Age & Sex: 54 y.o. female   MRN: [id number]  Unit/Bed#: W -01   Encounter: [id number]    PATIENT INFORMATION     Name: Marci Lopez   Age & Sex: 54 y.o. female   MRN: [id number]  Hospital Stay Days: 6    ASSESSMENT/PLAN     Assessment:     Patient is a 54-year-old female with a past medical history of hypertension, psoriasis, previous tobacco abuse who presented with worsening productive cough dyspnea and fevers for the past few weeks.  Started on treatment for pneumonia.  Pulmonology was consulted for further recommendations.    Acute hypoxemic respiratory failure  Suspected pneumonia  Reactive airway disease versus EVALI  Tobacco abuse  Hypertension  Psoriasis    Plan:  Procalcitonin negative, antibiotics discontinued  Steroid taper today  Respiratory protocol, out of bed to chair, incentive spirometry  Continue antitussives and mucolytics  Continue Breo, would benefit from continuing as an outpatient  Will likely need outpatient PFTs  Clinic messaged for follow-up  Home O2 eval before discharge  Rest of care per primary      SUBJECTIVE     Patient seen and examined. No acute events overnight.  Breathing has improved with steroids and bronchodilators.  Still with cough.  All other review of systems negative.    OBJECTIVE     Vitals:    24 0715 24 0745 24 0745 24 0900   BP:  116/86 116/86    BP Location:       Pulse:  87 88    Resp:       Temp:  (!) 97.4 °F (36.3 °C) (!) 97.4 °F (36.3 °C)    TempSrc:       SpO2: 95% 93% 96% 99%   Weight:       Height:          Temperature:   Temp (24hrs), Av.4 °F (36.3 °C), Min:97.1 °F (36.2 °C), Max:97.7 °F (36.5 °C)    Temperature: (!) 97.4 °F (36.3 °C)  Intake & Output:  I/O          07 07 07 07 0701  06/15 0700    P.O. 360 980 180    I.V. (mL/kg)       Total Intake(mL/kg) 360 (4) 980 (10.9) 180 (2)    Net +360 +980 +180           Unmeasured  Urine Occurrence 2 x      Unmeasured Emesis Occurrence 1 x            Weights:   IBW (Ideal Body Weight): 57 kg    Body mass index is 33.05 kg/m².  Weight (last 2 days)       None          Physical Exam  Vitals reviewed.   Constitutional:       General: She is not in acute distress.  HENT:      Head: Normocephalic and atraumatic.      Right Ear: External ear normal.      Left Ear: External ear normal.      Nose: Nose normal.      Mouth/Throat:      Mouth: Mucous membranes are moist.      Pharynx: Oropharynx is clear.   Eyes:      Extraocular Movements: Extraocular movements intact.      Pupils: Pupils are equal, round, and reactive to light.   Cardiovascular:      Rate and Rhythm: Normal rate and regular rhythm.   Pulmonary:      Effort: Pulmonary effort is normal.      Breath sounds: Wheezing present.   Abdominal:      General: Abdomen is flat. Bowel sounds are normal. There is no distension.      Tenderness: There is no abdominal tenderness.   Musculoskeletal:      Right lower leg: No edema.      Left lower leg: No edema.   Skin:     General: Skin is warm and dry.      Capillary Refill: Capillary refill takes less than 2 seconds.   Neurological:      General: No focal deficit present.      Mental Status: She is alert and oriented to person, place, and time.   Psychiatric:         Mood and Affect: Mood normal.         Behavior: Behavior normal.           LABORATORY DATA     Labs: I have personally reviewed pertinent reports.  Results from last 7 days   Lab Units 06/13/24  1331 06/12/24  0512 06/11/24  1304   WBC Thousand/uL 13.54* 13.41* 13.05*   HEMOGLOBIN g/dL 11.9 11.9 13.2   HEMATOCRIT % 37.4 37.0 41.7   PLATELETS Thousands/uL 425* 489* 521*   SEGS PCT % 74  --  49   MONO PCT % 3*  --  6   EOS PCT % 6  --  6      Results from last 7 days   Lab Units 06/12/24  0512 06/11/24  1304   POTASSIUM mmol/L 3.9 3.5   CHLORIDE mmol/L 106 106   CO2 mmol/L 24 25   BUN mg/dL 19 17   CREATININE mg/dL 0.72 0.78   CALCIUM mg/dL  9.6 9.4   ALK PHOS U/L  --  85   ALT U/L  --  18   AST U/L  --  18              Results from last 7 days   Lab Units 06/11/24  1304   INR  1.02   PTT seconds 31     Results from last 7 days   Lab Units 06/11/24  1515   LACTIC ACID mmol/L 2.2*     Micro:   Results from last 7 days   Lab Units 06/11/24  2124 06/11/24  1840 06/11/24  1325 06/11/24  1304   BLOOD CULTURE   --   --  No Growth After 5 Days. No Growth After 5 Days.   SPUTUM CULTURE   --  2+ Growth of  --   --    GRAM STAIN RESULT   --  1+ Epithelial cells per low power field*  2+ Polys*  1+ Gram positive cocci in pairs*  1+ Gram positive rods*  --   --    LEGIONELLA URINARY ANTIGEN  Negative  --   --   --    STREP PNEUMONIAE ANTIGEN, URINE  Negative  --   --   --          IMAGING & DIAGNOSTIC TESTING     Radiology Results: I have personally reviewed pertinent reports.  XR chest portable    Result Date: 6/11/2024  Impression: No acute cardiopulmonary disease. Workstation performed: [id number]     CTA ED chest PE study    Result Date: 6/11/2024  Impression: No pulmonary embolism. Diffuse tree-in-bud nodularity. Most commonly seen in the setting of infection. Small hiatal hernia. Workstation performed: [id number]     Other Diagnostic Testing: I have personally reviewed pertinent reports.    ACTIVE MEDICATIONS     Current Facility-Administered Medications   Medication Dose Route Frequency    albuterol (PROVENTIL HFA,VENTOLIN HFA) inhaler 2 puff  2 puff Inhalation Q4H PRN    ALPRAZolam (XANAX) tablet 0.25 mg  0.25 mg Oral 4x Daily PRN    amLODIPine (NORVASC) tablet 10 mg  10 mg Oral Daily    benzonatate (TESSALON PERLES) capsule 100 mg  100 mg Oral TID    butalbital-acetaminophen-caffeine (FIORICET,ESGIC) -40 mg per tablet 1 tablet  1 tablet Oral Q4H PRN    calcium carbonate (TUMS) chewable tablet 500 mg  500 mg Oral Daily PRN    clobetasol (TEMOVATE) 0.05 % cream   Topical BID    dextromethorphan-guaiFENesin (ROBITUSSIN DM) oral syrup 10 mL  10 mL Oral  "Q4H PRN    Diclofenac Sodium (VOLTAREN) 1 % topical gel 2 g  2 g Topical 4x Daily    fluticasone (FLONASE) 50 mcg/act nasal spray 1 spray  1 spray Each Nare Daily    fluticasone-vilanterol 200-25 mcg/actuation 1 puff  1 puff Inhalation Daily    guaiFENesin (MUCINEX) 12 hr tablet 1,200 mg  1,200 mg Oral Q12H YOLY    HYDROcodone Bit-[place] MBr (HYCODAN) oral syrup 5 mL  5 mL Oral Q6H YOLY    HYDROcodone Bit-[place] MBr (HYCODAN) oral syrup 5 mL  5 mL Oral Daily PRN    ipratropium (ATROVENT) 0.02 % inhalation solution 0.5 mg  0.5 mg Nebulization Q6H    levalbuterol (XOPENEX) inhalation solution 1.25 mg  1.25 mg Nebulization Q6H    lidocaine (LIDODERM) 5 % patch 1 patch  1 patch Topical Daily    methocarbamol (ROBAXIN) tablet 750 mg  750 mg Oral Q6H YOLY    metoprolol succinate (TOPROL-XL) 24 hr tablet 50 mg  50 mg Oral Daily    pantoprazole (PROTONIX) EC tablet 40 mg  40 mg Oral BID AC    phenol (CHLORASEPTIC) 1.4 % mucosal liquid 1 spray  1 spray Mouth/Throat Q2H PRN    predniSONE tablet 40 mg  40 mg Oral Daily         Disclaimer: Portions of the record may have been created with voice recognition software. Occasional wrong word or \"sound a like\" substitutions may have occurred due to the inherent limitations of voice recognition software. Careful consideration should be taken to recognize, using context, where substitutions have occurred.    Keith Guy DO, MS  Pulmonary and Critical Care Fellow, PGY-IV  [facility]    "

## 2024-06-18 ENCOUNTER — TRANSITIONAL CARE MANAGEMENT (OUTPATIENT)
Dept: FAMILY MEDICINE CLINIC | Facility: CLINIC | Age: 54
End: 2024-06-18

## 2024-06-18 LAB
DME PARACHUTE DELIVERY DATE ACTUAL: NORMAL
DME PARACHUTE DELIVERY DATE REQUESTED: NORMAL
DME PARACHUTE ITEM DESCRIPTION: NORMAL
DME PARACHUTE ORDER STATUS: NORMAL
DME PARACHUTE SUPPLIER NAME: NORMAL
DME PARACHUTE SUPPLIER PHONE: NORMAL

## 2024-06-18 NOTE — UTILIZATION REVIEW
NOTIFICATION OF ADMISSION DISCHARGE   This is a Notification of Discharge from [facility]. Please be advised that this patient has been discharge from our facility. Below you will find the admission and discharge date and time including the patient’s disposition.   UTILIZATION REVIEW CONTACT:  Suzanne Elder  Utilization   Network Utilization Review Department  Phone: [phone] carefully listen to the prompts. All voicemails are confidential.  Email: [email or web address]     ADMISSION INFORMATION  PRESENTATION DATE: 6/11/2024 12:56 PM  OBERVATION ADMISSION DATE:   INPATIENT ADMISSION DATE: 6/11/24  3:16 PM   DISCHARGE DATE: 6/17/2024  3:47 PM   DISPOSITION:Home/Self Care    Network Utilization Review Department  ATTENTION: Please call with any questions or concerns to [phone] and carefully listen to the prompts so that you are directed to the right person. All voicemails are confidential.   For Discharge needs, contact Care Management DC Support Team at [phone] opt. [phone]  Send all requests for admission clinical reviews, approved or denied determinations and any other requests to dedicated fax number below belonging to the campus where the patient is receiving treatment. List of dedicated fax numbers for the Facilities:  FACILITY NAME UR FAX NUMBER   ADMISSION DENIALS (Administrative/Medical Necessity) [phone]   DISCHARGE SUPPORT TEAM ([facility]) [phone]   PARENT CHILD HEALTH (Maternity/NICU/Pediatrics) [phone]   [facility] [phone]   [facility] [phone]   [facility] [phone]   [facility] [phone]   [facility] [phone]   [facility] [phone]   [facility] [phone]   [facility] [phone]    [facility] [phone]   [facility] [phone]   [facility] [phone]   [facility] [phone]

## 2024-06-19 ENCOUNTER — TELEPHONE (OUTPATIENT)
Age: 54
End: 2024-06-19

## 2024-06-19 DIAGNOSIS — M79.89 LEG SWELLING: Primary | ICD-10-CM

## 2024-06-19 RX ORDER — HYDROCHLOROTHIAZIDE 12.5 MG/1
12.5 TABLET ORAL DAILY PRN
Qty: 30 TABLET | Refills: 0 | Status: SHIPPED | OUTPATIENT
Start: 2024-06-19

## 2024-06-19 NOTE — TELEPHONE ENCOUNTER
Patient called stating she was released from the hospital with pneumonia. She was givedn prednisone but she said she is having a terrible time with swelling from her knees down to her ankles.   She said she is holding on to stuff to be able to walk and stand. Pt is asking if she can have a diuretic sent to her local pharmacy

## 2024-07-01 ENCOUNTER — OFFICE VISIT (OUTPATIENT)
Dept: FAMILY MEDICINE CLINIC | Facility: CLINIC | Age: 54
End: 2024-07-01
Payer: COMMERCIAL

## 2024-07-01 VITALS
TEMPERATURE: 97.6 F | DIASTOLIC BLOOD PRESSURE: 82 MMHG | HEART RATE: 96 BPM | WEIGHT: 197 LBS | BODY MASS INDEX: 32.82 KG/M2 | SYSTOLIC BLOOD PRESSURE: 124 MMHG | HEIGHT: 65 IN | RESPIRATION RATE: 22 BRPM

## 2024-07-01 DIAGNOSIS — J98.01 BRONCHOSPASM: ICD-10-CM

## 2024-07-01 DIAGNOSIS — D17.24 LIPOMA OF LEFT LOWER EXTREMITY: ICD-10-CM

## 2024-07-01 DIAGNOSIS — R06.02 SHORTNESS OF BREATH: ICD-10-CM

## 2024-07-01 DIAGNOSIS — R05.1 ACUTE COUGH: ICD-10-CM

## 2024-07-01 DIAGNOSIS — R06.2 WHEEZING: ICD-10-CM

## 2024-07-01 DIAGNOSIS — J18.9 PULMONARY INFECTION: ICD-10-CM

## 2024-07-01 DIAGNOSIS — R09.02 HYPOXIA: ICD-10-CM

## 2024-07-01 DIAGNOSIS — I10 ESSENTIAL HYPERTENSION: Primary | ICD-10-CM

## 2024-07-01 PROCEDURE — 99495 TRANSJ CARE MGMT MOD F2F 14D: CPT | Performed by: FAMILY MEDICINE

## 2024-07-01 RX ORDER — METHOCARBAMOL 750 MG/1
750 TABLET, FILM COATED ORAL EVERY 8 HOURS PRN
Qty: 20 TABLET | Refills: 0 | Status: SHIPPED | OUTPATIENT
Start: 2024-07-01

## 2024-07-01 RX ORDER — HYDROCODONE POLISTIREX AND CHLORPHENIRAMINE POLISTIREX 10; 8 MG/5ML; MG/5ML
5 SUSPENSION, EXTENDED RELEASE ORAL EVERY 12 HOURS PRN
Qty: 115 ML | Refills: 0 | Status: SHIPPED | OUTPATIENT
Start: 2024-07-01

## 2024-07-01 NOTE — PROGRESS NOTES
Transition of Care Visit  Name: Marci Lopez      : 1970      MRN: [id number]  Encounter Provider: Farhana Tompkins DO  Encounter Date: 2024   Encounter department: [facility]    Assessment & Plan   1. Essential hypertension  Assessment & Plan:  Stable   Continue amlodipine 10 mg a day and metoprolol 50 mg a day   2. Wheezing  3. Pulmonary infection  -     Diclofenac Sodium (VOLTAREN) 1 %; Apply 2 g topically 4 (four) times a day  -     methocarbamol (ROBAXIN) 750 mg tablet; Take 1 tablet (750 mg total) by mouth every 8 (eight) hours as needed for muscle spasms  4. Hypoxia  -     Diclofenac Sodium (VOLTAREN) 1 %; Apply 2 g topically 4 (four) times a day  -     methocarbamol (ROBAXIN) 750 mg tablet; Take 1 tablet (750 mg total) by mouth every 8 (eight) hours as needed for muscle spasms  5. Bronchospasm  -     Diclofenac Sodium (VOLTAREN) 1 %; Apply 2 g topically 4 (four) times a day  -     methocarbamol (ROBAXIN) 750 mg tablet; Take 1 tablet (750 mg total) by mouth every 8 (eight) hours as needed for muscle spasms  6. Shortness of breath  -     Diclofenac Sodium (VOLTAREN) 1 %; Apply 2 g topically 4 (four) times a day  -     methocarbamol (ROBAXIN) 750 mg tablet; Take 1 tablet (750 mg total) by mouth every 8 (eight) hours as needed for muscle spasms  7. Acute cough  -     Hydrocod Wes-Chlorphe Wes ER (TUSSIONEX) 10-8 mg/5 mL ER suspension; Take 5 mL by mouth every 12 (twelve) hours as needed for cough Max Daily Amount: 10 mL  8. Lipoma of left lower extremity  Assessment & Plan:  enlarging  Orders:  -     US extremity soft tissue; Future; Expected date: 2024   Return if symptoms worsen or fail to improve.    I am concerned that her cough is starting get worse again.  We discussed restarting prednisone but she is concerned about the side effects.  She was not able to sit still while she was on the medication.  She will contact the office if her symptoms start worsening again.  She  does have a follow-up appointment scheduled with pulmonology    History of Present Illness     Transitional Care Management Review:   Marci Lopez is a 54 y.o. female here for TCM follow up.     During the TCM phone call patient stated:  TCM Call     Date and time call was made  6/18/2024  9:40 AM    Hospital care reviewed  Records reviewed    Patient was hospitialized at  [facility]    Date of Admission  06/11/24    Date of discharge  06/17/24    Diagnosis  Reactive airway disease    Disposition  Home    Were the patients medications reviewed and updated  Yes    Current Symptoms  Cough    Cough Severity  Moderate      TCM Call     Post hospital issues  None    Scheduled for follow up?  Yes    Patients specialists  Pulmonlolgist    Did you obtain your prescribed medications  Yes    Do you need help managing your prescriptions or medications  No    Is transportation to your appointment needed  No    I have advised the patient to call PCP with any new or worsening symptoms  Antonette Navarro CMA    Living Arrangements  Alone    Support System  Family    The type of support provided  Emotional; Financial; Physical    Do you have social support  Yes, as much as I need    Are you recieving any outpatient services  No    Are you recieving home care services  No    Are you using any community resources  No    Current waiver services  No    Have you fallen in the last 12 months  No    Interperter language line needed  No    Counseling  Patient    Counseling topics  Importance of RX compliance; Activities of daily living; Home health agency benefits; patient and family education; Risk factor reduction        She finished her prednisone last week.  She started with coughing again. She is needing the cough medication at night.     She also just found out that her dog is in kidney failure.     She is concerned about the lipoma in her left leg.  She has noticed that has been getting larger and it is painful      Review  "of Systems  Objective     /82   Pulse 96   Temp 97.6 °F (36.4 °C)   Resp 22   Ht 5' 5\" (1.651 m)   Wt 89.4 kg (197 lb)   BMI 32.78 kg/m²     Physical Exam  Vitals and nursing note reviewed.   Constitutional:       General: She is not in acute distress.     Appearance: She is well-developed.   HENT:      Head: Normocephalic and atraumatic.      Right Ear: Tympanic membrane normal.      Left Ear: Tympanic membrane normal.   Cardiovascular:      Rate and Rhythm: Normal rate and regular rhythm.      Heart sounds: No murmur heard.  Pulmonary:      Effort: No respiratory distress.      Breath sounds: Normal breath sounds.      Comments: Wheezy cough  Musculoskeletal:         General: No swelling.      Cervical back: Neck supple.   Neurological:      Mental Status: She is alert.       Medications have been reviewed by provider in current encounter    Administrative Statements         "

## 2024-07-03 ENCOUNTER — HOSPITAL ENCOUNTER (OUTPATIENT)
Dept: RADIOLOGY | Facility: HOSPITAL | Age: 54
Discharge: HOME | End: 2024-07-03
Payer: COMMERCIAL

## 2024-07-03 DIAGNOSIS — D17.24 LIPOMA OF LEFT LOWER EXTREMITY: ICD-10-CM

## 2024-07-03 PROCEDURE — 76882 US LMTD JT/FCL EVL NVASC XTR: CPT

## 2024-07-15 DIAGNOSIS — M79.89 LEG SWELLING: ICD-10-CM

## 2024-07-16 RX ORDER — HYDROCHLOROTHIAZIDE 12.5 MG/1
12.5 TABLET ORAL DAILY PRN
Qty: 30 TABLET | Refills: 5 | Status: SHIPPED | OUTPATIENT
Start: 2024-07-16

## 2024-07-31 ENCOUNTER — TELEPHONE (OUTPATIENT)
Dept: OTHER | Facility: HOSPITAL | Age: 54
End: 2024-07-31

## 2024-07-31 ENCOUNTER — OFFICE VISIT (OUTPATIENT)
Dept: PULMONOLOGY | Facility: CLINIC | Age: 54
End: 2024-07-31
Payer: COMMERCIAL

## 2024-07-31 ENCOUNTER — APPOINTMENT (OUTPATIENT)
Dept: LAB | Facility: CLINIC | Age: 54
End: 2024-07-31
Payer: COMMERCIAL

## 2024-07-31 DIAGNOSIS — J98.01 BRONCHOSPASM: ICD-10-CM

## 2024-07-31 DIAGNOSIS — J18.9 PULMONARY INFECTION: ICD-10-CM

## 2024-07-31 DIAGNOSIS — U07.0 E-CIGARETTE OR VAPING PRODUCT USE ASSOCIATED LUNG INJURY (EVALI): Primary | ICD-10-CM

## 2024-07-31 DIAGNOSIS — R06.02 SHORTNESS OF BREATH: ICD-10-CM

## 2024-07-31 DIAGNOSIS — R09.02 HYPOXIA: ICD-10-CM

## 2024-07-31 LAB
BASOPHILS # BLD AUTO: 0.11 THOUSANDS/ÂΜL (ref 0–0.1)
BASOPHILS NFR BLD AUTO: 2 % (ref 0–1)
EOSINOPHIL # BLD AUTO: 0.29 THOUSAND/ÂΜL (ref 0–0.61)
EOSINOPHIL NFR BLD AUTO: 4 % (ref 0–6)
ERYTHROCYTE [DISTWIDTH] IN BLOOD BY AUTOMATED COUNT: 14.5 % (ref 11.6–15.1)
HCT VFR BLD AUTO: 39.2 % (ref 34.8–46.1)
HGB BLD-MCNC: 12.3 G/DL (ref 11.5–15.4)
IMM GRANULOCYTES # BLD AUTO: 0.03 THOUSAND/UL (ref 0–0.2)
IMM GRANULOCYTES NFR BLD AUTO: 0 % (ref 0–2)
LYMPHOCYTES # BLD AUTO: 2.81 THOUSANDS/ÂΜL (ref 0.6–4.47)
LYMPHOCYTES NFR BLD AUTO: 41 % (ref 14–44)
MCH RBC QN AUTO: 27 PG (ref 26.8–34.3)
MCHC RBC AUTO-ENTMCNC: 31.4 G/DL (ref 31.4–37.4)
MCV RBC AUTO: 86 FL (ref 82–98)
MONOCYTES # BLD AUTO: 0.49 THOUSAND/ÂΜL (ref 0.17–1.22)
MONOCYTES NFR BLD AUTO: 7 % (ref 4–12)
NEUTROPHILS # BLD AUTO: 3.15 THOUSANDS/ÂΜL (ref 1.85–7.62)
NEUTS SEG NFR BLD AUTO: 46 % (ref 43–75)
NRBC BLD AUTO-RTO: 0 /100 WBCS
PLATELET # BLD AUTO: 362 THOUSANDS/UL (ref 149–390)
PMV BLD AUTO: 10 FL (ref 8.9–12.7)
PROCALCITONIN SERPL-MCNC: <0.05 NG/ML
RBC # BLD AUTO: 4.56 MILLION/UL (ref 3.81–5.12)
WBC # BLD AUTO: 6.88 THOUSAND/UL (ref 4.31–10.16)

## 2024-07-31 PROCEDURE — 85025 COMPLETE CBC W/AUTO DIFF WBC: CPT

## 2024-07-31 PROCEDURE — 84145 PROCALCITONIN (PCT): CPT

## 2024-07-31 PROCEDURE — 99215 OFFICE O/P EST HI 40 MIN: CPT | Performed by: NURSE PRACTITIONER

## 2024-07-31 PROCEDURE — 36415 COLL VENOUS BLD VENIPUNCTURE: CPT

## 2024-07-31 RX ORDER — METHOCARBAMOL 750 MG/1
750 TABLET, FILM COATED ORAL EVERY 8 HOURS PRN
Qty: 20 TABLET | Refills: 0 | Status: SHIPPED | OUTPATIENT
Start: 2024-07-31

## 2024-07-31 RX ORDER — CODEINE PHOSPHATE/GUAIFENESIN 10-100MG/5
5 LIQUID (ML) ORAL 3 TIMES DAILY PRN
Qty: 118 ML | Refills: 0 | Status: SHIPPED | OUTPATIENT
Start: 2024-07-31

## 2024-07-31 NOTE — PROGRESS NOTES
"Progress Note - Pulmonary   Marci Lopez 54 y.o. female MRN: [id number]  Unit/Bed#:  Encounter: [id number]    Assessment/Plan:    1.  Persistent cough       -Overall etiology unclear          -Patient reports worse in the morning and in the evening occasionally with green sputum production       -I suspect possible postviral secondary to asthmatic reaction however given abnormal chest CT along with vaping and autoimmune history will need obtain further microbiology and imaging to rule out further causes        -Now will order guaifenesin with codeine and Robaxin as it seems to help with symptom relief        -Prednisone did not seem to improve cough symptoms    2.  Abnormal chest CT        -6/11/2024-diffuse tree-in-bud nodularity        -Overall etiology remains unclear        -Differentials:EVALI vs infection vs congenital vs autoimmune vs eosinophilia vs unlikely malignancy        -Given that patient is significantly still symptomatic we will need to repeat chest CT        -Order placed for Pro-Atif and CBC with differential        -If tree-in-bud is persistent will need bronchoscopy    3.  Suspected moderate persistent asthma with recent exacerbation        -Eos- [id number]        -He was remained elevated despite being on steroids in the hospital        -Overall noted aeration throughout lung fields        -Patient will continue: Allergy 200-62.5-25 mcg 1 puff daily, butyryl every 6 as needed, ProAir 2 puffs every 6 as needed        -May need to consider Biologics if symptoms persist          Chief Complaint:    \" I cannot stop coughing\"    HPI:    Marci presented today for hospital follow-up.  Patient reports that he traveled to [facility] from May 1 through 18.  Patient reports that her friend developed a URI.  On May 22 patient was initiated on amoxicillin and a course of prednisone per PCP.  Cough persisted patient was eventually hospitalized from 6/11-6/17 suspected asthmatic reaction to viral " pneumonia.  Patient was initiated on IV Solu-Medrol and eventually discharged on prednisone taper.     Today patient presents with persistent dry cough she feels that it is throughout her upper airway.  Patient reports that her cough is worse in the morning and at night.  Patient reports that the cough did start increased however she was vaping for approximately 1 week.  However she did have significant sick contact as well.  Chest CT did show tree-in-bud abnormalities which is concerning for EVALI vs infection vs congenital vs autoimmune vs eosinophilia vs unlikely malignancy.    Objective:    Vitals: There were no vitals taken for this visit.,There is no height or weight on file to calculate BMI.There were no vitals taken for this visit.            ROS  Cough     Physical Exam:     Physical Exam  Constitutional:       General: She is not in acute distress.     Appearance: Normal appearance. She is normal weight. She is not ill-appearing.   HENT:      Head: Normocephalic and atraumatic.      Nose: Nose normal. No congestion or rhinorrhea.      Mouth/Throat:      Mouth: Mucous membranes are dry.      Pharynx: Oropharynx is clear. No oropharyngeal exudate or posterior oropharyngeal erythema.   Cardiovascular:      Rate and Rhythm: Normal rate and regular rhythm.      Pulses: Normal pulses.      Heart sounds: Normal heart sounds. No murmur heard.     No friction rub. No gallop.   Pulmonary:      Effort: Pulmonary effort is normal. No respiratory distress.      Breath sounds: Normal breath sounds. No stridor. No wheezing, rhonchi or rales.      Comments: Clear breath sounds  Chest:      Chest wall: No tenderness.   Abdominal:      General: Abdomen is flat. Bowel sounds are normal. There is no distension.      Palpations: Abdomen is soft. There is no mass.   Musculoskeletal:         General: No swelling or tenderness. Normal range of motion.      Cervical back: Normal range of motion. No rigidity or tenderness.    Skin:     General: Skin is warm and dry.      Coloration: Skin is not jaundiced or pale.   Neurological:      General: No focal deficit present.      Mental Status: She is alert and oriented to person, place, and time. Mental status is at baseline.   Psychiatric:         Mood and Affect: Mood normal.         Behavior: Behavior normal.             Imaging and other studies: I have personally reviewed pertinent films in PACS    Chest CT scattered tree-in-bud

## 2024-07-31 NOTE — TELEPHONE ENCOUNTER
Can you please let patient know that procalcitonin was negative so no indication of a bacterial pneumonia also her eosinophil level is going down which is a good indication of decreasing inflammation    Please let her know that I will call her after CT scan has been completed

## 2024-08-09 ENCOUNTER — TELEPHONE (OUTPATIENT)
Age: 54
End: 2024-08-09

## 2024-08-09 ENCOUNTER — HOSPITAL ENCOUNTER (OUTPATIENT)
Dept: RADIOLOGY | Facility: HOSPITAL | Age: 54
Discharge: HOME | End: 2024-08-09
Payer: COMMERCIAL

## 2024-08-09 DIAGNOSIS — U07.0 E-CIGARETTE OR VAPING PRODUCT USE ASSOCIATED LUNG INJURY (EVALI): ICD-10-CM

## 2024-08-09 PROCEDURE — 71250 CT THORAX DX C-: CPT

## 2024-08-09 NOTE — TELEPHONE ENCOUNTER
Patient called in and would like for Evelyn to give her once her CT results are in . Please and thank you

## 2024-08-12 ENCOUNTER — TELEPHONE (OUTPATIENT)
Dept: OTHER | Facility: HOSPITAL | Age: 54
End: 2024-08-12

## 2024-08-12 DIAGNOSIS — J45.20 MILD INTERMITTENT REACTIVE AIRWAY DISEASE WITHOUT COMPLICATION: Primary | ICD-10-CM

## 2024-08-12 NOTE — TELEPHONE ENCOUNTER
Phone call with patient:   Still occasional coughing, no benefit with Trelegy. She can stop that medication.    Robaxin was no benefit - she can take as needed rather than Q8h.    Needs preop clearance - to discuss with DR Morris at next appt and in review of PFT findings (left leg aneurysm and lipoma). Will order PFT.    She wants to start Wegovy - OK to start SGLP1 as directed by PCP    We discussed that anxiety can mimic chest tightness and she is aware.

## 2024-08-12 NOTE — TELEPHONE ENCOUNTER
Pt is aware of the results. She states it's the cough that is persistent and her cardiologist states that it can last up to 6 months.     She wants to know if she is going to be on the medication for long term or this cough possibly being an autoimmune issues of her lungs overworking and that is why her airways constricting. She is concerned about her high levels of anxiety her oxygen levels can drop down to 50%.     She states this has been going on since May and the cough is in her chest.     Pt reports that she just had a test and there is a cyst and states that it bothers her and she would like to get the work done but she will need surgical clearance from Pulm and that there is no resolution noted in her imaging.    She wants to know is there going to be more testing done or what is to be expected, is she to be on the cough medication, muscle relaxer for chest, and inhalers.     Pt wants to know if she will ever be seeing Dr. Dahl. She needs someone to reach out to her and make aware.

## 2024-08-12 NOTE — TELEPHONE ENCOUNTER
Patient called in following up on vm she received in regards to her ct results .    Please advise

## 2024-08-15 DIAGNOSIS — M79.89 LEG SWELLING: ICD-10-CM

## 2024-08-15 RX ORDER — HYDROCHLOROTHIAZIDE 12.5 MG/1
12.5 TABLET ORAL DAILY PRN
Qty: 30 TABLET | Refills: 5 | Status: SHIPPED | OUTPATIENT
Start: 2024-08-15

## 2024-08-15 NOTE — TELEPHONE ENCOUNTER
PT said she was cleared by the pulmonary team to start wegovy. PT requesting wegovy order to be sent to [facility] [address] [place] [address]. She would like a phone call once the order is placed to give her a heads up.    Please advise    Thank You

## 2024-08-16 ENCOUNTER — TELEPHONE (OUTPATIENT)
Dept: FAMILY MEDICINE CLINIC | Facility: CLINIC | Age: 54
End: 2024-08-16

## 2024-08-16 ENCOUNTER — TELEPHONE (OUTPATIENT)
Age: 54
End: 2024-08-16

## 2024-08-16 DIAGNOSIS — E66.09 CLASS 1 OBESITY DUE TO EXCESS CALORIES WITH SERIOUS COMORBIDITY AND BODY MASS INDEX (BMI) OF 34.0 TO 34.9 IN ADULT: Primary | ICD-10-CM

## 2024-08-16 DIAGNOSIS — E66.811 CLASS 1 OBESITY DUE TO EXCESS CALORIES WITH SERIOUS COMORBIDITY AND BODY MASS INDEX (BMI) OF 34.0 TO 34.9 IN ADULT: Primary | ICD-10-CM

## 2024-08-16 NOTE — TELEPHONE ENCOUNTER
Made patient aware. She wanted to let Dr. Tompkins know that her insurance will only cover the lower dose of wegovy for 1 month. After that, they will only cover increased dosage

## 2024-08-16 NOTE — TELEPHONE ENCOUNTER
Please let her know that I sent the prescription into her pharmacy in [place] as requested  Thank,  Dr. Tompkins

## 2024-08-16 NOTE — TELEPHONE ENCOUNTER
Reviewed - that is how I ordered the medication    No further action required    Farhana Tompkins, DO

## 2024-08-16 NOTE — TELEPHONE ENCOUNTER
Pt. Said she saw (You)  Dr. Tompkins about 4 weeks ago and she said once she got the approval she would send in for the Wegovy for her. She called to ask if you could send it now to the Pharmacy in [place].  They have it. She also told me that she had a tummy tuck and when she gets bloated or gains weight it all goes to the upper part of her stomach. She was on steroids for awhile she gained 20 pounds and she is so uncomfortable. She needs to get the weight off.  She was hoping (You)  Dr. Tompkins could send in the order for the Wegovy today. 8/16.      Ty

## 2024-08-26 ENCOUNTER — NURSE TRIAGE (OUTPATIENT)
Age: 54
End: 2024-08-26

## 2024-08-26 DIAGNOSIS — J18.9 PULMONARY INFECTION: ICD-10-CM

## 2024-08-26 DIAGNOSIS — J98.01 BRONCHOSPASM: ICD-10-CM

## 2024-08-26 DIAGNOSIS — R09.02 HYPOXIA: ICD-10-CM

## 2024-08-26 DIAGNOSIS — R05.9 COUGH, UNSPECIFIED TYPE: Primary | ICD-10-CM

## 2024-08-26 DIAGNOSIS — R06.02 SHORTNESS OF BREATH: ICD-10-CM

## 2024-08-26 NOTE — TELEPHONE ENCOUNTER
I called and spoke with Marci. All questions answered. Her concern today is that her cough is productive of thick, green, foul mucus. I have ordered a sputum culture for which she is agreeable. No fevers or chills. She will get this done tomorrow and follow-up as previously scheduled.

## 2024-08-26 NOTE — TELEPHONE ENCOUNTER
Pt stated Pulm Provider: Dr. Morris    Actionable item: Med recommendations    What is the reason for the call/chief complaint?    Pt calling regarding her persistent cough. She states the cough has been worse within the past week and is now productive of green sputum. She has also been experiencing chest tightness. Denies SOB or wheezing. Denies fever. She is scheduled for f/u 9/16. Offered sooner f/u today or next week but pt would prefer to keep 9/16 appt at this time. She is concerned she has an infection since the sputum is now green. She is also requesting med refills, sent in separate encounter.

## 2024-08-26 NOTE — TELEPHONE ENCOUNTER
Regarding: SOB chest pains  ----- Message from Tara BRADLEY sent at 8/26/2024 10:37 AM EDT -----  Pt states she has f/u appt from being in the hospital for pneumonia she explains she has a bad cough and she is coughing up Green mucus and has chest pains from coughing. She does notice that in cold air this cough flares up. Please advise

## 2024-08-26 NOTE — TELEPHONE ENCOUNTER
"Reason for Disposition  • Cough with no complications    Answer Assessment - Initial Assessment Questions  1. ONSET: \"When did the cough begin?\"       Ongoing  2. SEVERITY: \"How bad is the cough today?\"       Unspecified  3. SPUTUM: \"Describe the color of your sputum\" (none, dry cough; clear, white, yellow, green)      Green  4. HEMOPTYSIS: \"Are you coughing up any blood?\" If so ask: \"How much?\" (flecks, streaks, tablespoons, etc.)      No  5. DIFFICULTY BREATHING: \"Are you having difficulty breathing?\" If Yes, ask: \"How bad is it?\" (e.g., mild, moderate, severe)     - MILD: No SOB at rest, mild SOB with walking, speaks normally in sentences, can lay down, no retractions, pulse < 100.     - MODERATE: SOB at rest, SOB with minimal exertion and prefers to sit, cannot lie down flat, speaks in phrases, mild retractions, audible wheezing, pulse 100-120.     - SEVERE: Very SOB at rest, speaks in single words, struggling to breathe, sitting hunched forward, retractions, pulse > 120       Denies SOB  6. FEVER: \"Do you have a fever?\" If Yes, ask: \"What is your temperature, how was it measured, and when did it start?\"      Denies  7. CARDIAC HISTORY: \"Do you have any history of heart disease?\" (e.g., heart attack, congestive heart failure)       See chart  8. LUNG HISTORY: \"Do you have any history of lung disease?\"  (e.g., pulmonary embolus, asthma, emphysema)      Persistent cough  9. PE RISK FACTORS: \"Do you have a history of blood clots?\" (or: recent major surgery, recent prolonged travel, bedridden)      None specified   10. OTHER SYMPTOMS: \"Do you have any other symptoms?\" (e.g., runny nose, wheezing, chest pain)        Chest tightness.        Denies wheezing or SOB    Protocols used: Cough-ADULT-OH    "

## 2024-08-27 RX ORDER — METHOCARBAMOL 750 MG/1
750 TABLET, FILM COATED ORAL EVERY 8 HOURS PRN
Qty: 20 TABLET | Refills: 0 | OUTPATIENT
Start: 2024-08-27

## 2024-08-27 RX ORDER — CODEINE PHOSPHATE/GUAIFENESIN 10-100MG/5
5 LIQUID (ML) ORAL 3 TIMES DAILY PRN
Qty: 118 ML | Refills: 0 | OUTPATIENT
Start: 2024-08-27

## 2024-08-28 ENCOUNTER — TELEPHONE (OUTPATIENT)
Age: 54
End: 2024-08-28

## 2024-08-28 ENCOUNTER — APPOINTMENT (OUTPATIENT)
Dept: LAB | Facility: HOSPITAL | Age: 54
End: 2024-08-28

## 2024-08-28 NOTE — TELEPHONE ENCOUNTER
Patient would like appt with PCP only Declined appt with any other provider in practice. Waiting for pulmonary to call her back. Aware PCP was only in the office in the  morning . Still coughing.Triggered by cold air.  Coughing up yellow mucus chunks. Please advise

## 2024-08-28 NOTE — TELEPHONE ENCOUNTER
"Incoming call from patient    Pt states need \"airway muscle relaxer\" to cough, doesn't understand why can't have until she does sputum culture.    Explained we need her to have a strong productive cough in order to collect the sputum for the culture. Patient states will collect sputum tomorrow AM and will call us back to order cough/muscle relaxer medication.    Pt had no further questions and/or concerns at this time.  "

## 2024-09-12 RX ORDER — ALPRAZOLAM 1 MG
1 TABLET ORAL 2 TIMES DAILY PRN
COMMUNITY
Start: 2024-08-22

## 2024-09-12 RX ORDER — CELECOXIB 200 MG/1
200 CAPSULE ORAL DAILY
COMMUNITY

## 2024-09-16 ENCOUNTER — OFFICE VISIT (OUTPATIENT)
Dept: PULMONOLOGY | Facility: CLINIC | Age: 54
End: 2024-09-16
Payer: COMMERCIAL

## 2024-09-16 ENCOUNTER — OFFICE VISIT (OUTPATIENT)
Dept: FAMILY MEDICINE CLINIC | Facility: CLINIC | Age: 54
End: 2024-09-16
Payer: COMMERCIAL

## 2024-09-16 VITALS
WEIGHT: 207.8 LBS | SYSTOLIC BLOOD PRESSURE: 122 MMHG | RESPIRATION RATE: 16 BRPM | HEIGHT: 65 IN | DIASTOLIC BLOOD PRESSURE: 64 MMHG | BODY MASS INDEX: 34.62 KG/M2 | TEMPERATURE: 98 F | HEART RATE: 68 BPM

## 2024-09-16 VITALS
BODY MASS INDEX: 34.49 KG/M2 | WEIGHT: 207 LBS | DIASTOLIC BLOOD PRESSURE: 68 MMHG | HEIGHT: 65 IN | TEMPERATURE: 96.5 F | SYSTOLIC BLOOD PRESSURE: 122 MMHG

## 2024-09-16 DIAGNOSIS — B99.9 RECURRENT INFECTIONS: Primary | ICD-10-CM

## 2024-09-16 DIAGNOSIS — J06.9 ACUTE URI: ICD-10-CM

## 2024-09-16 DIAGNOSIS — J01.00 ACUTE MAXILLARY SINUSITIS, RECURRENCE NOT SPECIFIED: ICD-10-CM

## 2024-09-16 DIAGNOSIS — R10.30 LOWER ABDOMINAL PAIN: ICD-10-CM

## 2024-09-16 DIAGNOSIS — J18.9 PULMONARY INFECTION: ICD-10-CM

## 2024-09-16 DIAGNOSIS — D17.24 LIPOMA OF LEFT LOWER EXTREMITY: Primary | ICD-10-CM

## 2024-09-16 DIAGNOSIS — J45.21 MILD INTERMITTENT ACUTE ASTHMATIC BRONCHITIS: ICD-10-CM

## 2024-09-16 DIAGNOSIS — J98.01 BRONCHOSPASM: ICD-10-CM

## 2024-09-16 DIAGNOSIS — R06.02 SHORTNESS OF BREATH: ICD-10-CM

## 2024-09-16 DIAGNOSIS — R09.02 HYPOXIA: ICD-10-CM

## 2024-09-16 PROCEDURE — 95012 NITRIC OXIDE EXP GAS DETER: CPT | Performed by: STUDENT IN AN ORGANIZED HEALTH CARE EDUCATION/TRAINING PROGRAM

## 2024-09-16 PROCEDURE — 99214 OFFICE O/P EST MOD 30 MIN: CPT | Performed by: FAMILY MEDICINE

## 2024-09-16 PROCEDURE — 99215 OFFICE O/P EST HI 40 MIN: CPT | Performed by: STUDENT IN AN ORGANIZED HEALTH CARE EDUCATION/TRAINING PROGRAM

## 2024-09-16 RX ORDER — AMOXICILLIN 875 MG
875 TABLET ORAL 2 TIMES DAILY
Qty: 14 TABLET | Refills: 0 | Status: SHIPPED | OUTPATIENT
Start: 2024-09-16 | End: 2024-09-23

## 2024-09-16 RX ORDER — FLUTICASONE FUROATE, UMECLIDINIUM BROMIDE AND VILANTEROL TRIFENATATE 200; 62.5; 25 UG/1; UG/1; UG/1
1 POWDER RESPIRATORY (INHALATION) DAILY
Qty: 60 BLISTER | Refills: 0 | Status: SHIPPED | OUTPATIENT
Start: 2024-09-16 | End: 2024-12-15

## 2024-09-16 RX ORDER — ALBUTEROL SULFATE 90 UG/1
2 INHALANT RESPIRATORY (INHALATION) EVERY 4 HOURS PRN
Qty: 8 G | Refills: 5 | Status: SHIPPED | OUTPATIENT
Start: 2024-09-16

## 2024-09-16 NOTE — PROGRESS NOTES
Pulmonary Outpatient Progress Note   Marci Lopez 54 y.o. female MRN: [id number]  9/16/2024      Assessment:    Suspected mild intermittent asthma versus reactive airway disease, complaining of on and off wheezing, upper respiratory symptoms including occasional wheezing from her throat, mild nocturnal symptoms, seasonal changes along with environmental changes trigger her asthma.  She tried Trelegy for very time, occasionally uses rescue inhaler.  The inhalers do occasionally work for her.  She has noted to have peripheral eosinophilia.  Reviewed her CT chest there is no significant parenchymal abnormalities.  I suspect this is more reactive airway disease.  I emphasized using Trelegy along with rescue inhaler and nebulizers as needed.  She seems somewhat apprehensive to trying of these inhalers.  She is more concerned about her sputum change at times therefore we will also ordered immunoglobulin testing and allergy panel  Nicotine dependence in remission quit several years ago after a 40-pack-year history  Recent hospitalization in June for possible pneumonia, reactive airway disease, respiratory failure    Plan:    Use Trelegy daily  Use rescue inhaler as needed  Use DuoNebs as needed  Obtain immunoglobulin testing and allergy panel  Reviewed CT chest with her, no significant parenchymal abnormalities  In office FeNO today 51  Follow-up in 3 months  Preoperative risk assessment as below  I have spent a total time of 41 minutes in caring for this patient on the day of the visit/encounter including Diagnostic results, Prognosis, Risks and benefits of tx options, Instructions for management, Patient and family education, Importance of tx compliance, Risk factor reductions, Impressions, Counseling / Coordination of care, Documenting in the medical record, Reviewing / ordering tests, medicine, procedures  , and Obtaining or reviewing history  .        ARISCAT Score for Postoperative Pulmonary Complications      RESULT SUMMARY:  20 points  ARISCAT Score    Low risk  1.6% risk of in-hospital post-op pulmonary complications (composite including respiratory failure, respiratory infection, pleural effusion, atelectasis, pneumothorax, bronchospasm, aspiration pneumonitis)      INPUTS:  Age, years --> 3 = 51-80  Preoperative SpO? --> 0 = ?96%  Respiratory infection in the last month --> 17 = Yes  Preoperative anemia (Hgb ?10 g/dL) --> 0 = No  Surgical incision --> 0 = Peripheral  Duration of surgery --> 0 = <2 hrs  Emergency procedure --> 0 = No      History of Present Illness   HPI:    54-year-old female here for follow-up, recently seen in July for persistent cough, in the spring she traveled overseas to [facility] came back with a URI treated with amoxicillin and prednisone at that time she was vaping for 1 week, in June was hospitalized for possible viral pneumonia treated with steroids.  Still coughing even on Trelegy, was tried on Robaxin.  Attempted sputum culture but not collected.  Also here for preoperative risk assessment.    Patient here for evaluation states that since her URI she still has occasional reactive airway disease symptoms with wheezing, sinus infections, green sputum,, no fevers.    Aside from that she not using Trelegy, occasionally uses rescue nebulizer or inhaler.    PFT pending, did not obtain due to coughing fits    Ultrasound leg with left popliteal artery aneurysm    CT chest reviewed, clear parenchymal with no mediastinal lymphadenopathy    RP 2 negative in June, COVID-positive in January 2023, peripheral eosinophilia [phone]      Review of Systems   Constitutional: Negative.  Negative for appetite change and fever.   HENT:  Positive for sore throat. Negative for ear pain, nosebleeds, postnasal drip, rhinorrhea, sneezing and trouble swallowing.    Eyes: Negative.    Respiratory:  Positive for cough and wheezing.    Cardiovascular: Negative.  Negative for chest pain.   Gastrointestinal: Negative.     Endocrine: Negative.    Genitourinary: Negative.    Musculoskeletal: Negative.  Negative for myalgias.   Skin: Negative.    Allergic/Immunologic: Negative.    Neurological: Negative.  Negative for headaches.   Hematological: Negative.    Psychiatric/Behavioral: Negative.          Historical Information   Past Medical History:   Diagnosis Date    Allergic rhinitis     Anxiety     Colon polyp     Disease of thyroid gland     Endometriosis     Fibroid     Hypertension     Kidney stones     left side    Migraine     teenager     Past Surgical History:   Procedure Laterality Date    ABDOMINAL SURGERY  2023    tummy tuck hernia repair    ABDOMINOPLASTY      APPENDECTOMY      CHOLECYSTECTOMY      FOOT FRACTURE SURGERY      HERNIA REPAIR      umbilical x 3 last one 2023    KNEE CARTILAGE SURGERY Right     and ACL    LAPAROSCOPIC GASTRIC BANDING      removed    OVARIAN CYST SURGERY      SHOULDER SURGERY Right     x 3    STOMACH SURGERY  2023    tummy tuck     Family History   Problem Relation Age of Onset    Stroke Mother     Hypertension Mother     Diabetes Father     Coronary artery disease Father     Pancreatic cancer Father     Heart disease Father     Cancer Father         pancreatic    Hypertension Father     Diabetes Sister     Psoriasis Sister     Hypertension Sister     Breast cancer Maternal Grandmother 80    Breast cancer Maternal Aunt 58    Breast cancer Paternal Aunt         unkown age     Social History     Tobacco Use   Smoking Status Former    Current packs/day: 0.00    Average packs/day: 0.3 packs/day for 44.0 years (11.0 ttl pk-yrs)    Types: Cigarettes    Start date: 6/15/1984    Quit date: 2023    Years since quittin.3    Passive exposure: Past   Smokeless Tobacco Never       Occupational History: not working now, was [employer]    Meds/Allergies     Current Outpatient Medications:     albuterol (ProAir HFA) 90 mcg/act inhaler, Inhale 2 puffs every 4 (four) hours as needed for wheezing,  Disp: 8 g, Rfl: 0    ALPRAZolam (XANAX) 0.25 mg tablet, Take 0.25 mg by mouth daily at bedtime as needed for anxiety, Disp: , Rfl:     ALPRAZolam (XANAX) 1 mg tablet, Take 1 mg by mouth 2 (two) times a day as needed, Disp: , Rfl:     celecoxib (CeleBREX) 200 mg capsule, Take 200 mg by mouth daily, Disp: , Rfl:     diazepam (VALIUM) 5 mg tablet, Take 5 mg by mouth daily at bedtime as needed, Disp: , Rfl:     guaifenesin-codeine (GUAIFENESIN AC) 100-10 MG/5ML liquid, Take 5 mL by mouth 3 (three) times a day as needed for cough, Disp: 118 mL, Rfl: 0    halobetasol (ULTRAVATE) 0.05 % ointment, Apply topically 2 (two) times a day To affected area, Disp: 50 g, Rfl: 1    hydroCHLOROthiazide 12.5 mg tablet, Take 1 tablet (12.5 mg total) by mouth daily as needed (leg swelling), Disp: 30 tablet, Rfl: 5    ipratropium (ATROVENT) 0.02 % nebulizer solution, Take 2.5 mL (0.5 mg total) by nebulization every 8 (eight) hours as needed for wheezing or shortness of breath, Disp: 150 mL, Rfl: 0    levalbuterol (XOPENEX) 1.25 mg/3 mL nebulizer solution, Take 3 mL (1.25 mg total) by nebulization every 8 (eight) hours as needed for wheezing or shortness of breath, Disp: 90 mL, Rfl: 0    methocarbamol (ROBAXIN) 750 mg tablet, Take 1 tablet (750 mg total) by mouth every 8 (eight) hours as needed for muscle spasms, Disp: 20 tablet, Rfl: 0    Semaglutide-Weight Management (WEGOVY) 0.5 MG/0.5ML, Inject 0.5 mL (0.5 mg total) under the skin once a week for 28 days Do not start before September 11, 2024., Disp: 2 mL, Rfl: 0    [START ON 10/9/2024] Semaglutide-Weight Management (WEGOVY) 1 MG/0.5ML, Inject 0.5 mL (1 mg total) under the skin once a week for 28 days Do not start before October 9, 2024., Disp: 2 mL, Rfl: 0    [START ON 11/6/2024] Semaglutide-Weight Management (WEGOVY) 1.7 MG/0.75ML, Inject 0.75 mL (1.7 mg total) under the skin once a week for 28 days Do not start before November 6, 2024., Disp: 3 mL, Rfl: 0    [START ON 12/4/2024]  "Semaglutide-Weight Management (WEGOVY) 2.4 MG/0.75ML, Inject 0.75 mL (2.4 mg total) under the skin once a week for 28 days Do not start before December 4, 2024., Disp: 3 mL, Rfl: 0    amLODIPine (NORVASC) 10 mg tablet, Take 10 mg by mouth daily, Disp: , Rfl:     fluticasone-umeclidinium-vilanterol (Trelegy Ellipta) 200-62.5-25 mcg/actuation AEPB inhaler, Inhale 1 puff daily Rinse mouth after use., Disp: 60 blister, Rfl: 0    metoprolol succinate (TOPROL-XL) 50 mg 24 hr tablet, Take 50 mg by mouth daily, Disp: , Rfl:   Allergies   Allergen Reactions    Adhesive [Medical Tape] Rash     welts and burns    Betadine [Povidone Iodine] Rash     Burn and swelling    Macrobid [Nitrofurantoin] Throat Swelling       Vitals: Height 5' 5\" (1.651 m), weight 93.9 kg (207 lb)., Body mass index is 34.45 kg/m².      Physical Exam:    GEN: alert and oriented x 3, pleasant and cooperative   HEENT:  Normocephalic, atraumatic  NECK: No JVD, right naris enlarged turbinate  HEART: Rate, normal S1 and S2  LUNGS: Clear to auscultation bilaterally; no wheezes, rales, or rhonchi; respiration nonlabored   ABDOMEN:  Normoactive bowel sounds, soft, no tenderness, no distention  EXTREMITIES: no edema  NEURO: no gross focal findings  SKIN:  Dry, intact, warm to touch    Labs: I have personally reviewed pertinent lab results.  Peripheral eosinophilia  No results found for: \"IGE\"    Imaging and other studies: Personally reviewed the following image studies in PACS and associated radiology reports: chest xray and CT chest. My interpretation of the radiology images/reports is: CT chest with no parenchymal abnormalities,.    Pulmonary function testing:  NA    Other Studies: Reviewed radiology reports from this admission including: chest xray.    Barbara Morris MD  Pulmonary and Critical Care Medicine     Answers submitted by the patient for this visit:  Pulmonology Questionnaire (Submitted on 9/15/2024)  Chief Complaint: Primary symptoms  Do you have chest " tightness?: Yes  Chronicity: chronic  When did you first notice your symptoms?: more than 1 month ago  How often do your symptoms occur?: daily  Since you first noticed this problem, how has it changed?: unchanged  Do you have shortness of breath that occurs with effort or exertion?: Yes  Do you have ear congestion?: No  Do you have heartburn?: Yes  Do you have fatigue?: Yes  Do you have nasal congestion?: No  Do you have shortness of breath when lying flat?: No  Do you have shortness of breath when you wake up?: No  Do you have sweats?: No  Have you experienced weight loss?: No  Which of the following makes your symptoms worse?: change in weather, emotional stress, exposure to fumes, lying down  Which of the following makes your symptoms better?: prescription cough suppressant, steroid inhaler  Risk factors for lung disease: smoking/tobacco exposure

## 2024-09-16 NOTE — PROGRESS NOTES
"Ambulatory Visit  Name: Marci Lopez      : 1970      MRN: [id number]  Encounter Provider: Farhana Tompkins DO  Encounter Date: 2024   Encounter department: [facility]    Assessment & Plan  Acute maxillary sinusitis, recurrence not specified    Orders:    amoxicillin (AMOXIL) 875 mg tablet; Take 1 tablet (875 mg total) by mouth 2 (two) times a day for 7 days    Lipoma of left lower extremity  Worsening  Orders:    Ambulatory referral to General Surgery; Future    Lower abdominal pain  New, over area of recent surgery   Orders:    CT abdomen pelvis w wo contrast; Future     Return in about 6 months (around 3/16/2025) for Annual physical.  History of Present Illness     The lipoma in her left thigh is getting bigger.    She has been having issues wince her tummy tuck          Review of Systems        Objective     /64   Pulse 68   Temp 98 °F (36.7 °C)   Resp 16   Ht 5' 5\" (1.651 m)   Wt 94.3 kg (207 lb 12.8 oz)   BMI 34.58 kg/m²     Physical Exam  Vitals and nursing note reviewed.   Constitutional:       General: She is not in acute distress.     Appearance: She is well-developed.   HENT:      Head: Normocephalic and atraumatic.      Right Ear: Tympanic membrane normal.      Left Ear: Tympanic membrane normal.   Cardiovascular:      Rate and Rhythm: Normal rate and regular rhythm.      Heart sounds: No murmur heard.  Pulmonary:      Effort: Pulmonary effort is normal. No respiratory distress.      Breath sounds: Normal breath sounds.   Abdominal:      Comments: Bilateral lower abdominal tenderness   Musculoskeletal:      Cervical back: Neck supple.   Skin:     Comments: tender lump left medial thigh   Neurological:      Mental Status: She is alert.         "

## 2024-09-16 NOTE — PATIENT INSTRUCTIONS
It was a pleasure seeing you today!    Use Trelegy daily  Use rescue inhaler as needed  Use nebulizer as needed  Obtain immunoglobulin and allergy tests  Follow up in 3 months     Barbara Morris MD  Pulmonary and Critical Care Medicine

## 2024-09-27 ENCOUNTER — HOSPITAL ENCOUNTER (OUTPATIENT)
Dept: RADIOLOGY | Facility: HOSPITAL | Age: 54
Discharge: HOME | End: 2024-09-27
Payer: COMMERCIAL

## 2024-09-27 DIAGNOSIS — R10.30 LOWER ABDOMINAL PAIN: ICD-10-CM

## 2024-09-27 PROCEDURE — 74177 CT ABD & PELVIS W/CONTRAST: CPT

## 2024-09-27 RX ADMIN — IOHEXOL 100 ML: 350 INJECTION, SOLUTION INTRAVENOUS at 10:46

## 2024-10-03 ENCOUNTER — CONSULT (OUTPATIENT)
Dept: SURGERY | Facility: CLINIC | Age: 54
End: 2024-10-03
Payer: COMMERCIAL

## 2024-10-03 VITALS
DIASTOLIC BLOOD PRESSURE: 89 MMHG | WEIGHT: 207.6 LBS | SYSTOLIC BLOOD PRESSURE: 151 MMHG | HEART RATE: 93 BPM | TEMPERATURE: 97 F | HEIGHT: 65 IN | BODY MASS INDEX: 34.59 KG/M2

## 2024-10-03 DIAGNOSIS — D17.24 LIPOMA OF LEFT LOWER EXTREMITY: ICD-10-CM

## 2024-10-03 PROCEDURE — 99203 OFFICE O/P NEW LOW 30 MIN: CPT | Performed by: SURGERY

## 2024-10-03 NOTE — PROGRESS NOTES
"[facility] History and Physical Note:    Assessment: Left medial thigh lipoma    Plan:  Patient states she is considering seeing Plastic Surgery for fat tissue removal in legs, will return to General Surgery if she would like lipoma removed  No surgical intervention planned at this time    Chief Complaint: \"I have a lipoma\"    Patient is a 54 year old female who was referred to General Surgery by PCP for assessment of enlarging left thigh lipoma.   Patient states she was told ~1 year ago that she has a lipoma in the left thigh. She states that over time it has become more prominent, painful, tender. She states she can also feel additional lipomas in the area that are growing. She denies any current chest pain, nausea/vomiting. Patient has had soft tissue ultrasounds in the past, most recent one showing: mass in the subcutaneous tissues of the left posterior thigh measuring approximately 4.0 x 1.8 x 3.7 cm  Patient is considering seeing Plastic Surgery for cosmetic procedure for bilateral leg fat tissue.         PMH:  Past Medical History:   Diagnosis Date    Allergic rhinitis     Anxiety     Colon polyp     Disease of thyroid gland     Endometriosis     Fibroid     Hypertension     Kidney stones     left side    Migraine     teenager       PSH:  Past Surgical History:   Procedure Laterality Date    ABDOMINAL SURGERY  05/09/2023    tummy tuck hernia repair    ABDOMINOPLASTY      APPENDECTOMY      CHOLECYSTECTOMY      FOOT FRACTURE SURGERY  2021    HERNIA REPAIR      umbilical x 3 last one 05/2023    KNEE CARTILAGE SURGERY Right     and ACL    LAPAROSCOPIC GASTRIC BANDING      removed    OVARIAN CYST SURGERY      SHOULDER SURGERY Right     x 3    STOMACH SURGERY  05/2023    tummy tuck       Home Meds:  Current Outpatient Medications on File Prior to Visit   Medication Sig Dispense Refill    albuterol (ProAir HFA) 90 mcg/act inhaler Inhale 2 puffs every 4 (four) hours as needed for wheezing 8 " g 5    ALPRAZolam (XANAX) 1 mg tablet Take 1 mg by mouth 2 (two) times a day as needed      amLODIPine (NORVASC) 10 mg tablet Take 10 mg by mouth daily      celecoxib (CeleBREX) 200 mg capsule Take 200 mg by mouth daily      diazepam (VALIUM) 5 mg tablet Take 5 mg by mouth daily at bedtime as needed      fluticasone-umeclidinium-vilanterol (Trelegy Ellipta) 200-62.5-25 mcg/actuation AEPB inhaler Inhale 1 puff daily Rinse mouth after use. 60 blister 0    halobetasol (ULTRAVATE) 0.05 % ointment Apply topically 2 (two) times a day To affected area 50 g 1    hydroCHLOROthiazide 12.5 mg tablet Take 1 tablet (12.5 mg total) by mouth daily as needed (leg swelling) 30 tablet 5    ipratropium (ATROVENT) 0.02 % nebulizer solution Take 2.5 mL (0.5 mg total) by nebulization every 8 (eight) hours as needed for wheezing or shortness of breath 150 mL 0    levalbuterol (XOPENEX) 1.25 mg/3 mL nebulizer solution Take 3 mL (1.25 mg total) by nebulization every 8 (eight) hours as needed for wheezing or shortness of breath 90 mL 0    metoprolol succinate (TOPROL-XL) 50 mg 24 hr tablet Take 50 mg by mouth daily      [START ON 10/9/2024] Semaglutide-Weight Management (WEGOVY) 1 MG/0.5ML Inject 0.5 mL (1 mg total) under the skin once a week for 28 days Do not start before October 9, 2024. 2 mL 0    [START ON 11/6/2024] Semaglutide-Weight Management (WEGOVY) 1.7 MG/0.75ML Inject 0.75 mL (1.7 mg total) under the skin once a week for 28 days Do not start before November 6, 2024. 3 mL 0    [START ON 12/4/2024] Semaglutide-Weight Management (WEGOVY) 2.4 MG/0.75ML Inject 0.75 mL (2.4 mg total) under the skin once a week for 28 days Do not start before December 4, 2024. 3 mL 0    guaifenesin-codeine (GUAIFENESIN AC) 100-10 MG/5ML liquid Take 5 mL by mouth 3 (three) times a day as needed for cough 118 mL 0    methocarbamol (ROBAXIN) 750 mg tablet Take 1 tablet (750 mg total) by mouth every 8 (eight) hours as needed for muscle spasms 20 tablet 0      No current facility-administered medications on file prior to visit.       Allergies:  Allergies   Allergen Reactions    Adhesive [Medical Tape] Rash     welts and burns    Betadine [Povidone Iodine] Rash     Burn and swelling    Macrobid [Nitrofurantoin] Throat Swelling       Social Hx:  Social History     Socioeconomic History    Marital status: Single     Spouse name: Not on file    Number of children: Not on file    Years of education: Not on file    Highest education level: Not on file   Occupational History    Not on file   Tobacco Use    Smoking status: Former     Current packs/day: 0.00     Average packs/day: 0.3 packs/day for 44.0 years (11.0 ttl pk-yrs)     Types: Cigarettes     Start date: 6/15/1984     Quit date: 2023     Years since quittin.4     Passive exposure: Past    Smokeless tobacco: Never   Vaping Use    Vaping status: Never Used   Substance and Sexual Activity    Alcohol use: Not Currently    Drug use: Not Currently     Types: Marijuana     Comment: gummies to sleep for aniety and pain    Sexual activity: Yes     Partners: Male     Birth control/protection: Condom Male, Post-menopausal   Other Topics Concern    Not on file   Social History Narrative    Not on file     Social Determinants of Health     Financial Resource Strain: Not on file   Food Insecurity: No Food Insecurity (2023)    [facility]    Hunger Vital Sign     Worried About Running Out of Food in the Last Year: Never true     Ran Out of Food in the Last Year: Never true   Transportation Needs: No Transportation Needs (2023)    [facility]    PRAPARE - Transportation     Lack of Transportation (Medical): No     Lack of Transportation (Non-Medical): No   Physical Activity: Not on file   Stress: Not on file   Social Connections: Not on file   Intimate Partner Violence: Not on file   Housing Stability:  "Low Risk  (6/9/2023)    [facility]    Housing Stability Vital Sign     Unable to Pay for Housing in the Last Year: No     Number of Places Lived in the Last Year: 1     Unstable Housing in the Last Year: No        Family Hx:    Family History   Problem Relation Age of Onset    Stroke Mother     Hypertension Mother     Diabetes Father     Coronary artery disease Father     Pancreatic cancer Father     Heart disease Father     Cancer Father         pancreatic    Hypertension Father     Diabetes Sister     Psoriasis Sister     Hypertension Sister     Breast cancer Maternal Grandmother 80    Breast cancer Maternal Aunt 58    Breast cancer Paternal Aunt         unkown age         Review of Systems   Constitutional:  Negative for chills and fever.   HENT:  Negative for ear pain and sore throat.    Eyes:  Negative for pain and visual disturbance.   Respiratory:  Negative for cough and shortness of breath.    Cardiovascular:  Negative for chest pain and palpitations.   Gastrointestinal:  Negative for abdominal pain, constipation, diarrhea and vomiting.   Genitourinary:  Negative for dysuria and hematuria.   Musculoskeletal:  Negative for arthralgias and back pain.        Left thigh pain in area of lump     Skin:  Negative for color change and rash.   Neurological:  Negative for seizures, syncope and headaches.   All other systems reviewed and are negative.      /89 (BP Location: Left arm, Patient Position: Sitting, Cuff Size: Large)   Pulse 93   Temp (!) 97 °F (36.1 °C) (Core)   Ht 5' 5\" (1.651 m)   Wt 94.2 kg (207 lb 9.6 oz)   BMI 34.55 kg/m²     Physical Exam  Vitals reviewed.   Constitutional:       Appearance: Normal appearance.   HENT:      Head: Normocephalic and atraumatic.      Nose: Nose normal.      Mouth/Throat:      Mouth: Mucous membranes are moist.      Pharynx: Oropharynx is clear.   Eyes:      Conjunctiva/sclera: Conjunctivae normal.      " Pupils: Pupils are equal, round, and reactive to light.   Cardiovascular:      Rate and Rhythm: Normal rate and regular rhythm.   Pulmonary:      Effort: Pulmonary effort is normal.   Abdominal:      General: Abdomen is flat.      Palpations: Abdomen is soft.   Musculoskeletal:      Cervical back: Normal range of motion.      Comments: Left medial thigh lipoma- soft, well-circumscribed, mobile, tender to palpation   Skin:     General: Skin is warm and dry.   Neurological:      Mental Status: She is alert and oriented to person, place, and time.         Pertinent labs reviewed    Pertinent images and available reads personally reviewed  US extremity soft tissue 10/20/23  US extremity soft tissue 7/3/24    Pertinent notes reviewed

## 2024-10-11 ENCOUNTER — TELEPHONE (OUTPATIENT)
Dept: OTHER | Facility: HOSPITAL | Age: 54
End: 2024-10-11

## 2024-10-11 NOTE — TELEPHONE ENCOUNTER
Pt called because she knew all her wegovy dosages were sent to the pharmacy but the pharmacy is saying that It needs a prior auth for her 1mL dosage. When trying to reach the office the pt disconnected the call and the office was unavailable. Can we please research and contact pt ot advise the status of her prior auth for new dosage

## 2024-10-11 NOTE — TELEPHONE ENCOUNTER
Reason for call:   [x] Refill   [] Prior Auth  [x] Other: Patient states she was told she needs a new Prior authorization. Will need her next dose by 10/15/2024.    Office:   [x] PCP/Provider - [facility] - Farhana Tompkins, DO   [] Specialty/Provider -     Medication: Semaglutide-Weight Management (WEGOVY) 1 MG/0.5ML    Dose/Frequency: Inject 0.5 mL (1 mg total) under the skin once a week for 28 days Do not start before October 9, 2024     Quantity: 2 mL     Pharmacy: [facility] [place] [id number]  Phone: [phone]  Fax: [phone]     Does the patient have enough for 3 days?   [x] Yes   [] No - Send as HP to POD

## 2024-10-14 ENCOUNTER — TELEPHONE (OUTPATIENT)
Dept: FAMILY MEDICINE CLINIC | Facility: CLINIC | Age: 54
End: 2024-10-14

## 2024-10-15 ENCOUNTER — TELEPHONE (OUTPATIENT)
Dept: FAMILY MEDICINE CLINIC | Facility: CLINIC | Age: 54
End: 2024-10-15

## 2024-10-15 ENCOUNTER — TELEPHONE (OUTPATIENT)
Age: 54
End: 2024-10-15

## 2024-10-15 NOTE — TELEPHONE ENCOUNTER
Submitted/faxed another PA form directly to the insurance at [phone]. Hold time at 4:25 was 20-30 mins with no option of a call back, I will try calling in the morning tomorrow.

## 2024-10-15 NOTE — TELEPHONE ENCOUNTER
Patient called in to follow up on status of approval. Advised current status showed approves. Patient stated pharmacy is not seeing approval. Spoke with Valencia advised to give patient case ID number [id number] for approval. Valencia advised she will follow up with pharmacy and patient.   Please advise, thank you

## 2024-10-15 NOTE — TELEPHONE ENCOUNTER
Pt called in due to the pharmacy not receiving the PA for the 1ml Wegovy. Was able to connect with Valencia from PA department and wanted to provide this information.   Fax number: [phone]  Attention to Carmita    Thank you

## 2024-10-15 NOTE — TELEPHONE ENCOUNTER
I explained  all dosages are documented as already approved but patient said [facility] told her is still waiting for approval. Asked if clinical would call her back to advise what she needs to do.

## 2024-10-16 NOTE — TELEPHONE ENCOUNTER
Patient called in stating she was very upset and wanted to speak to Valencia or an . Attempted to call clerical and patient hung up the call. Spoke with Valencia who stated it was approved and will call the patient. LYLYA needed at this time.   0

## 2024-10-16 NOTE — TELEPHONE ENCOUNTER
Duplicate encounter created, please see telephone encounter from 10- regarding WEGOVY PA status. Please review patient's chart to see if there is already an encounter regarding the medication in question and to document anything regarding this medication in regards to anything regarding the authorization process etc before creating another encounter Thank You.

## 2024-10-16 NOTE — TELEPHONE ENCOUNTER
Will be calling [facility] pharmacy tomorrow morning, I was transferred for over 45 mins, just to receive the phone number.  [phone]    Ref # [id number]

## 2024-10-16 NOTE — TELEPHONE ENCOUNTER
PA HAS BEEN APPROVED. CALLING INSURANCE COMPANY AGAIN TO GET THE APPROVAL LETTER TO FAX TO THE PHARMACY BECAUSE THE PHARMACY WILL NOT ACCEPT A CASE ID AND APPROVAL DATES    [phone]

## 2024-10-16 NOTE — TELEPHONE ENCOUNTER
PT called and she was very disgruntled. She said will be stopping by the office tomorrow to find out what's going on with her wegovy script.    Please advise    Thankyou

## 2024-10-16 NOTE — TELEPHONE ENCOUNTER
Was on hold for 30 mins, to be told I need to be transferred to the [place] Dept for [employer]. Still trying to get an approval letter to send over to the pharmacy

## 2024-10-17 ENCOUNTER — PATIENT MESSAGE (OUTPATIENT)
Dept: FAMILY MEDICINE CLINIC | Facility: CLINIC | Age: 54
End: 2024-10-17

## 2024-10-17 ENCOUNTER — TELEPHONE (OUTPATIENT)
Age: 54
End: 2024-10-17

## 2024-10-17 DIAGNOSIS — E66.09 CLASS 1 OBESITY DUE TO EXCESS CALORIES WITH SERIOUS COMORBIDITY AND BODY MASS INDEX (BMI) OF 34.0 TO 34.9 IN ADULT: Primary | ICD-10-CM

## 2024-10-17 DIAGNOSIS — E66.811 CLASS 1 OBESITY DUE TO EXCESS CALORIES WITH SERIOUS COMORBIDITY AND BODY MASS INDEX (BMI) OF 34.0 TO 34.9 IN ADULT: Primary | ICD-10-CM

## 2024-10-17 RX ORDER — SEMAGLUTIDE 1.7 MG/.75ML
INJECTION, SOLUTION SUBCUTANEOUS
Qty: 3 ML | Refills: 0 | Status: SHIPPED | OUTPATIENT
Start: 2024-10-17

## 2024-10-17 NOTE — TELEPHONE ENCOUNTER
RECEIVED APPROVAL LETTER FROM [employer], IMMEDIATELY FAXED TO [facility] [phone]. NO FURTHER ACTION AT THIS TIME

## 2024-10-17 NOTE — TELEPHONE ENCOUNTER
[facility] is not open until 8:30, will try back then. Per pharmacy they cannot accept a verbal case ID# and approval dates with is all sure scripts and covermymeds have been able to give us. Will try back at 8:30 for an approval letter to fax over to her pharmacy.

## 2024-10-17 NOTE — TELEPHONE ENCOUNTER
Patient states that the pharmacy does not have the 1mg in stock. Patient is asking if she can go to the 1.7mg since the pharmacy has that one in stock. Patient states that she is getting headaches without taking the medication. The last injection was 10/08/24.  Patient states that she has not lost any weight. Current weight 203lbs.     [facility] - [address]

## 2024-10-17 NOTE — TELEPHONE ENCOUNTER
Spoke with Naila at [facility], where the CMM claim was filed through. She is faxing me the approval and stated it may take up to an hour. Once I receive it, I will be faxing it to the [facility] drug store in [place], since again, they will not accept case ID# or approval dates over the phone.  [facility]

## 2024-11-05 ENCOUNTER — HOSPITAL ENCOUNTER (OUTPATIENT)
Dept: RADIOLOGY | Facility: HOSPITAL | Age: 54
Discharge: HOME | End: 2024-11-05
Payer: COMMERCIAL

## 2024-11-05 DIAGNOSIS — Z12.31 ENCOUNTER FOR SCREENING MAMMOGRAM FOR MALIGNANT NEOPLASM OF BREAST: ICD-10-CM

## 2024-11-05 PROCEDURE — 77063 BREAST TOMOSYNTHESIS BI: CPT

## 2024-11-05 PROCEDURE — 77067 SCR MAMMO BI INCL CAD: CPT

## 2024-11-08 ENCOUNTER — TELEPHONE (OUTPATIENT)
Dept: PULMONOLOGY | Facility: CLINIC | Age: 54
End: 2024-11-08

## 2024-11-08 NOTE — TELEPHONE ENCOUNTER
called patient to switch her appt time per dr. archuleta and no answer. i left message aasking if 12:00 was ok for her. if she calls back then that time didnt work for her. 12/9 @ 12:00 dr archuleta

## 2024-12-26 ENCOUNTER — OFFICE VISIT (OUTPATIENT)
Dept: FAMILY MEDICINE CLINIC | Facility: CLINIC | Age: 54
End: 2024-12-26
Payer: COMMERCIAL

## 2024-12-26 VITALS
WEIGHT: 207 LBS | DIASTOLIC BLOOD PRESSURE: 74 MMHG | TEMPERATURE: 96.5 F | HEART RATE: 100 BPM | SYSTOLIC BLOOD PRESSURE: 124 MMHG | RESPIRATION RATE: 20 BRPM | HEIGHT: 65 IN | BODY MASS INDEX: 34.49 KG/M2

## 2024-12-26 DIAGNOSIS — R53.83 FATIGUE, UNSPECIFIED TYPE: ICD-10-CM

## 2024-12-26 DIAGNOSIS — I10 ESSENTIAL HYPERTENSION: ICD-10-CM

## 2024-12-26 DIAGNOSIS — I73.00 RAYNAUD'S DISEASE WITHOUT GANGRENE: Primary | ICD-10-CM

## 2024-12-26 DIAGNOSIS — L40.9 PSORIASIS: ICD-10-CM

## 2024-12-26 DIAGNOSIS — M79.10 MYALGIA: ICD-10-CM

## 2024-12-26 PROCEDURE — 99214 OFFICE O/P EST MOD 30 MIN: CPT | Performed by: FAMILY MEDICINE

## 2024-12-26 RX ORDER — CYCLOBENZAPRINE HCL 10 MG
TABLET ORAL
COMMUNITY
Start: 2024-12-18

## 2024-12-26 NOTE — PROGRESS NOTES
"Name: Marci Lopez      : 1970      MRN: [id number]  Encounter Provider: Farhana Tompkins DO  Encounter Date: 2024   Encounter department: [facility]  :  Assessment & Plan  Raynaud's disease without gangrene  New  Already on amlodipine   Orders:  •  Rheumatoid Arthritis Factor; Future  •  RUSSEL Screen w/Reflex Cascade; Future    Myalgia  new  Orders:  •  Lyme Total AB W Reflex to IGM/IGG; Future  •  Babesia microti antibody, IgG & Igm; Future    Psoriasis  Flaring        Essential hypertension  Controlled  Continue amlodipine 10 mg a day and Hydrochlorothiazide 12.5 mg a day   Orders:  •  CBC; Future  •  Comprehensive metabolic panel; Future  •  Lipid Panel with Direct LDL reflex; Future  •  T4, free; Future  •  TSH, 3rd generation; Future    Fatigue, unspecified type  Worsening   Orders:  •  Ferritin; Future  •  Lyme Total AB W Reflex to IGM/IGG; Future  •  Babesia microti antibody, IgG & Igm; Future  •  Vitamin B12; Future    Return in about 6 months (around 2025) for Annual physical.       History of Present Illness     She is sleeping 12-14 hours and still feels tired.  She is having muscle aches. She is not snoring.     Her finger tips are turning white.     She has been having headaches. She has been taking her blood pressure medication regularly.       Review of Systems   Constitutional:  Positive for fatigue.   Musculoskeletal:  Positive for myalgias.       Objective   /74   Pulse 100   Temp (!) 96.5 °F (35.8 °C)   Resp 20   Ht 5' 5\" (1.651 m)   Wt 93.9 kg (207 lb)   BMI 34.45 kg/m²      Physical Exam  Vitals and nursing note reviewed.   Constitutional:       General: She is not in acute distress.     Appearance: She is well-developed.   HENT:      Head: Normocephalic and atraumatic.      Right Ear: Tympanic membrane normal.      Left Ear: Tympanic membrane normal.   Cardiovascular:      Rate and Rhythm: Normal rate and regular rhythm.      Heart sounds: No murmur " heard.  Pulmonary:      Effort: Pulmonary effort is normal. No respiratory distress.      Breath sounds: Normal breath sounds.   Musculoskeletal:      Cervical back: Neck supple.   Neurological:      Mental Status: She is alert.

## 2024-12-26 NOTE — ASSESSMENT & PLAN NOTE
Controlled  Continue amlodipine 10 mg a day and Hydrochlorothiazide 12.5 mg a day   Orders:  •  CBC; Future  •  Comprehensive metabolic panel; Future  •  Lipid Panel with Direct LDL reflex; Future  •  T4, free; Future  •  TSH, 3rd generation; Future

## 2025-01-04 LAB — B BURGDOR IGG+IGM SER QL IA: NEGATIVE

## 2025-01-07 ENCOUNTER — RESULTS FOLLOW-UP (OUTPATIENT)
Dept: FAMILY MEDICINE CLINIC | Facility: CLINIC | Age: 55
End: 2025-01-07

## 2025-01-07 DIAGNOSIS — L40.9 PSORIASIS: ICD-10-CM

## 2025-01-07 LAB
ALBUMIN SERPL-MCNC: 4.3 G/DL (ref 3.8–4.9)
ALP SERPL-CCNC: 78 IU/L (ref 44–121)
ALT SERPL-CCNC: 13 IU/L (ref 0–32)
ANA TITR SER IF: NEGATIVE {TITER}
AST SERPL-CCNC: 17 IU/L (ref 0–40)
B MICROTI IGG TITR SER: NORMAL {TITER}
B MICROTI IGM TITR SER: NORMAL {TITER}
BILIRUB SERPL-MCNC: 0.2 MG/DL (ref 0–1.2)
BUN SERPL-MCNC: 16 MG/DL (ref 6–24)
BUN/CREAT SERPL: 18 (ref 9–23)
CALCIUM SERPL-MCNC: 9.4 MG/DL (ref 8.7–10.2)
CHLORIDE SERPL-SCNC: 104 MMOL/L (ref 96–106)
CHOLEST SERPL-MCNC: 218 MG/DL (ref 100–199)
CO2 SERPL-SCNC: 25 MMOL/L (ref 20–29)
CREAT SERPL-MCNC: 0.88 MG/DL (ref 0.57–1)
EGFR: 78 ML/MIN/1.73
ERYTHROCYTE [DISTWIDTH] IN BLOOD BY AUTOMATED COUNT: 12.6 % (ref 11.7–15.4)
FERRITIN SERPL-MCNC: 22 NG/ML (ref 15–150)
GLOBULIN SER-MCNC: 3 G/DL (ref 1.5–4.5)
GLUCOSE SERPL-MCNC: 91 MG/DL (ref 70–99)
HCT VFR BLD AUTO: 41.1 % (ref 34–46.6)
HDLC SERPL-MCNC: 72 MG/DL
HGB BLD-MCNC: 12.8 G/DL (ref 11.1–15.9)
LABORATORY COMMENT REPORT: NORMAL
LDLC SERPL CALC-MCNC: 126 MG/DL (ref 0–99)
LDLC/HDLC SERPL: 1.8 RATIO (ref 0–3.2)
MCH RBC QN AUTO: 26.7 PG (ref 26.6–33)
MCHC RBC AUTO-ENTMCNC: 31.1 G/DL (ref 31.5–35.7)
MCV RBC AUTO: 86 FL (ref 79–97)
MICRODELETION SYND BLD/T FISH: NORMAL
PLATELET # BLD AUTO: 320 X10E3/UL (ref 150–450)
POTASSIUM SERPL-SCNC: 4.2 MMOL/L (ref 3.5–5.2)
PROT SERPL-MCNC: 7.3 G/DL (ref 6–8.5)
RBC # BLD AUTO: 4.79 X10E6/UL (ref 3.77–5.28)
RESULT/COMMENT: NORMAL
RHEUMATOID FACT SERPL-ACNC: 10.4 IU/ML
SL AMB VLDL CHOLESTEROL CALC: 20 MG/DL (ref 5–40)
SODIUM SERPL-SCNC: 142 MMOL/L (ref 134–144)
T4 FREE SERPL-MCNC: 0.91 NG/DL (ref 0.82–1.77)
TRIGL SERPL-MCNC: 113 MG/DL (ref 0–149)
TSH SERPL DL<=0.005 MIU/L-ACNC: 2.4 UIU/ML (ref 0.45–4.5)
VIT B12 SERPL-MCNC: 852 PG/ML (ref 232–1245)
WBC # BLD AUTO: 5.6 X10E3/UL (ref 3.4–10.8)

## 2025-01-07 RX ORDER — HALOBETASOL PROPIONATE 0.05 %
OINTMENT (GRAM) TOPICAL 2 TIMES DAILY
Qty: 50 G | Refills: 1 | Status: SHIPPED | OUTPATIENT
Start: 2025-01-07

## 2025-01-28 DIAGNOSIS — I10 ESSENTIAL HYPERTENSION: ICD-10-CM

## 2025-01-28 DIAGNOSIS — M25.811 IMPINGEMENT OF RIGHT SHOULDER: ICD-10-CM

## 2025-01-28 DIAGNOSIS — F41.9 ANXIETY: Primary | ICD-10-CM

## 2025-01-28 DIAGNOSIS — M47.812 CERVICAL SPONDYLOSIS WITHOUT MYELOPATHY: ICD-10-CM

## 2025-01-28 DIAGNOSIS — M79.89 LEG SWELLING: ICD-10-CM

## 2025-01-28 DIAGNOSIS — L40.9 PSORIASIS: ICD-10-CM

## 2025-01-29 RX ORDER — HYDROCHLOROTHIAZIDE 12.5 MG/1
12.5 TABLET ORAL DAILY PRN
Qty: 30 TABLET | Refills: 5 | Status: SHIPPED | OUTPATIENT
Start: 2025-01-29

## 2025-01-29 RX ORDER — CYCLOBENZAPRINE HCL 10 MG
10 TABLET ORAL 3 TIMES DAILY PRN
Qty: 30 TABLET | Refills: 0 | Status: SHIPPED | OUTPATIENT
Start: 2025-01-29

## 2025-01-29 RX ORDER — METOPROLOL SUCCINATE 50 MG/1
50 TABLET, EXTENDED RELEASE ORAL DAILY
Qty: 90 TABLET | Refills: 0 | Status: SHIPPED | OUTPATIENT
Start: 2025-01-29

## 2025-01-29 RX ORDER — CELECOXIB 200 MG/1
200 CAPSULE ORAL DAILY
Qty: 90 CAPSULE | Refills: 0 | Status: SHIPPED | OUTPATIENT
Start: 2025-01-29

## 2025-01-29 RX ORDER — AMLODIPINE BESYLATE 10 MG/1
10 TABLET ORAL DAILY
Qty: 90 TABLET | Refills: 0 | Status: SHIPPED | OUTPATIENT
Start: 2025-01-29 | End: 2028-09-02

## 2025-01-29 RX ORDER — HALOBETASOL PROPIONATE 0.05 %
OINTMENT (GRAM) TOPICAL 2 TIMES DAILY
Qty: 50 G | Refills: 0 | OUTPATIENT
Start: 2025-01-29

## 2025-01-29 RX ORDER — DIAZEPAM 5 MG/1
5 TABLET ORAL
Refills: 0 | Status: CANCELLED | OUTPATIENT
Start: 2025-01-29

## 2025-01-29 RX ORDER — ALPRAZOLAM 1 MG/1
1 TABLET ORAL 2 TIMES DAILY PRN
Qty: 60 TABLET | Refills: 0 | Status: SHIPPED | OUTPATIENT
Start: 2025-01-29

## 2025-05-11 DIAGNOSIS — I10 ESSENTIAL HYPERTENSION: ICD-10-CM

## 2025-05-11 RX ORDER — AMLODIPINE BESYLATE 10 MG/1
10 TABLET ORAL DAILY
Qty: 90 TABLET | Refills: 1 | Status: SHIPPED | OUTPATIENT
Start: 2025-05-11

## 2025-05-11 RX ORDER — METOPROLOL SUCCINATE 50 MG/1
50 TABLET, EXTENDED RELEASE ORAL DAILY
Qty: 90 TABLET | Refills: 1 | Status: SHIPPED | OUTPATIENT
Start: 2025-05-11

## 2025-07-02 ENCOUNTER — OFFICE VISIT (OUTPATIENT)
Dept: FAMILY MEDICINE CLINIC | Facility: CLINIC | Age: 55
End: 2025-07-02

## 2025-07-02 VITALS
HEART RATE: 62 BPM | DIASTOLIC BLOOD PRESSURE: 80 MMHG | WEIGHT: 211 LBS | RESPIRATION RATE: 18 BRPM | OXYGEN SATURATION: 92 % | BODY MASS INDEX: 36.02 KG/M2 | SYSTOLIC BLOOD PRESSURE: 130 MMHG | HEIGHT: 64 IN | TEMPERATURE: 98.2 F

## 2025-07-02 DIAGNOSIS — Z00.00 ANNUAL PHYSICAL EXAM: Primary | ICD-10-CM

## 2025-07-02 DIAGNOSIS — L40.9 PSORIASIS: ICD-10-CM

## 2025-07-02 PROCEDURE — 99396 PREV VISIT EST AGE 40-64: CPT | Performed by: FAMILY MEDICINE

## 2025-07-02 RX ORDER — SERTRALINE HYDROCHLORIDE 100 MG/1
1 TABLET, FILM COATED ORAL DAILY
COMMUNITY
Start: 2025-05-28

## 2025-07-02 RX ORDER — HALOBETASOL PROPIONATE 0.05 %
OINTMENT (GRAM) TOPICAL 2 TIMES DAILY
Qty: 50 G | Refills: 1 | Status: SHIPPED | OUTPATIENT
Start: 2025-07-02

## 2025-07-02 NOTE — PATIENT INSTRUCTIONS
"Patient Education     Routine physical for adults   The Basics   Written by the doctors and editors at [facility], [place]   What is a physical? -- A physical is a routine visit, or \"check-up,\" with your doctor. You might also hear it called a \"wellness visit\" or \"preventive visit.\"  During each visit, the doctor will:   Ask about your physical and mental health   Ask about your habits, behaviors, and lifestyle   Do an exam   Give you vaccines if needed   Talk to you about any medicines you take   Give advice about your health   Answer your questions  Getting regular check-ups is an important part of taking care of your health. It can help your doctor find and treat any problems you have. But it's also important for preventing health problems.  A routine physical is different from a \"sick visit.\" A sick visit is when you see a doctor because of a health concern or problem. Since physicals are scheduled ahead of time, you can think about what you want to ask the doctor.  How often should I get a physical? -- It depends on your age and health. In general, for people age 21 years and older:   If you are younger than 50 years, you might be able to get a physical every 3 years.   If you are 50 years or older, your doctor might recommend a physical every year.  If you have an ongoing health condition, like diabetes or high blood pressure, your doctor will probably want to see you more often.  What happens during a physical? -- In general, each visit will include:   Physical exam - The doctor or nurse will check your height, weight, heart rate, and blood pressure. They will also look at your eyes and ears. They will ask about how you are feeling and whether you have any symptoms that bother you.   Medicines - It's a good idea to bring a list of all the medicines you take to each doctor visit. Your doctor will talk to you about your medicines and answer any questions. Tell them if you are having any side effects that bother you. You " "should also tell them if you are having trouble paying for any of your medicines.   Habits and behaviors - This includes:   Your diet   Your exercise habits   Whether you smoke, drink alcohol, or use drugs   Whether you are sexually active   Whether you feel safe at home  Your doctor will talk to you about things you can do to improve your health and lower your risk of health problems. They will also offer help and support. For example, if you want to quit smoking, they can give you advice and might prescribe medicines. If you want to improve your diet or get more physical activity, they can help you with this, too.   Lab tests, if needed - The tests you get will depend on your age and situation. For example, your doctor might want to check your:   Cholesterol   Blood sugar   Iron level   Vaccines - The recommended vaccines will depend on your age, health, and what vaccines you already had. Vaccines are very important because they can prevent certain serious or deadly infections.   Discussion of screening - \"Screening\" means checking for diseases or other health problems before they cause symptoms. Your doctor can recommend screening based on your age, risk, and preferences. This might include tests to check for:   Cancer, such as breast, prostate, cervical, ovarian, colorectal, prostate, lung, or skin cancer   Sexually transmitted infections, such as chlamydia and gonorrhea   Mental health conditions like depression and anxiety  Your doctor will talk to you about the different types of screening tests. They can help you decide which screenings to have. They can also explain what the results might mean.   Answering questions - The physical is a good time to ask the doctor or nurse questions about your health. If needed, they can refer you to other doctors or specialists, too.  Adults older than 65 years often need other care, too. As you get older, your doctor will talk to you about:   How to prevent falling at " home   Hearing or vision tests   Memory testing   How to take your medicines safely   Making sure that you have the help and support you need at home  All topics are updated as new evidence becomes available and our peer review process is complete.  This topic retrieved from [employer] on: May 02, 2024.  Topic 563176 Version 1.0  Release: 32.4.3 - C32.122  © 2024 UpToDate, Inc. and/or its affiliates. All rights reserved.  Consumer Information Use and Disclaimer   Disclaimer: This generalized information is a limited summary of diagnosis, treatment, and/or medication information. It is not meant to be comprehensive and should be used as a tool to help the user understand and/or assess potential diagnostic and treatment options. It does NOT include all information about conditions, treatments, medications, side effects, or risks that may apply to a specific patient. It is not intended to be medical advice or a substitute for the medical advice, diagnosis, or treatment of a health care provider based on the health care provider's examination and assessment of a patient's specific and unique circumstances. Patients must speak with a health care provider for complete information about their health, medical questions, and treatment options, including any risks or benefits regarding use of medications. This information does not endorse any treatments or medications as safe, effective, or approved for treating a specific patient. UpToDate, Inc. and its affiliates disclaim any warranty or liability relating to this information or the use thereof.The use of this information is governed by the Terms of Use, available at https://www.[employer]eticsuwer.com/en/know/clinical-effectiveness-terms. 2024© UpToDate, Inc. and its affiliates and/or licensors. All rights reserved.  Copyright   © 2024 UpToDate, Inc. and/or its affiliates. All rights reserved.

## 2025-07-02 NOTE — ASSESSMENT & PLAN NOTE
Orders:  •  halobetasol (ULTRAVATE) 0.05 % ointment; Apply topically 2 (two) times a day To affected area

## 2025-07-02 NOTE — PROGRESS NOTES
Adult Annual Physical  Name: Mraci Lopez      : 1970      MRN: [id number]  Encounter Provider: Farhana Tompkins DO  Encounter Date: 2025   Encounter department: [facility]    :  Assessment & Plan  Annual physical exam         Psoriasis    Orders:  •  halobetasol (ULTRAVATE) 0.05 % ointment; Apply topically 2 (two) times a day To affected area        Preventive Screenings:  - Diabetes Screening: screening up-to-date  - Hepatitis C screening: screening up-to-date   - HIV screening: risks/benefits discussed and patient declines   - Cervical cancer screening: screening up-to-date   - Breast cancer screening: screening up-to-date   - Colon cancer screening: screening up-to-date   - Lung cancer screening: screening not indicated     Immunizations:  - Immunizations due: Prevnar 20, Tdap and Zoster (Shingrix)  - Risks/benefits immunizations discussed    - The patient declines recommended vaccines currently despite my recommendations      Counseling/Anticipatory Guidance:    - Dental health: discussed importance of regular tooth brushing, flossing, and dental visits.   - Diet: discussed recommendations for a healthy/well-balanced diet.   - Exercise: the importance of regular exercise/physical activity was discussed. Recommend exercise 3-5 times per week for at least 30 minutes.          History of Present Illness     Adult Annual Physical:  Patient presents for annual physical. She has been out of work for 2.5 years. She is applying for jobs. .     Diet and Physical Activity:  - Diet/Nutrition: no special diet.  - Exercise: no formal exercise.    Depression Screening:    - PHQ-9 Score: 4    General Health:  - Sleep: sleeps poorly, 4-6 hours of sleep on average and unrefreshing sleep.  - Hearing: normal hearing bilateral ears.  - Vision: wears glasses and most recent eye exam < 1 year ago.  - Dental: regular dental visits, brushes teeth twice daily and floss regularly.    /GYN Health:  - Follows  "with GYN: yes.     Review of Systems   Constitutional: Negative.    Respiratory: Negative.     Cardiovascular: Negative.          Objective   /80   Pulse 62   Temp 98.2 °F (36.8 °C)   Resp 18   Ht 5' 4\" (1.626 m)   Wt 95.7 kg (211 lb)   SpO2 92%   BMI 36.22 kg/m²     Physical Exam  Vitals and nursing note reviewed.   Constitutional:       Appearance: She is well-developed.   HENT:      Head: Normocephalic and atraumatic.      Right Ear: External ear normal.      Left Ear: External ear normal.      Nose: Nose normal.     Cardiovascular:      Rate and Rhythm: Normal rate and regular rhythm.      Heart sounds: Normal heart sounds. No murmur heard.     No friction rub.   Pulmonary:      Effort: No respiratory distress.      Breath sounds: Normal breath sounds. No wheezing or rales.   Abdominal:      Palpations: Abdomen is soft.      Tenderness: There is no abdominal tenderness.     Musculoskeletal:      Right lower leg: No edema.      Left lower leg: No edema.     Neurological:      Mental Status: She is oriented to person, place, and time.      Cranial Nerves: No cranial nerve deficit.         "